# Patient Record
Sex: MALE | Race: OTHER | HISPANIC OR LATINO | Employment: OTHER | URBAN - METROPOLITAN AREA
[De-identification: names, ages, dates, MRNs, and addresses within clinical notes are randomized per-mention and may not be internally consistent; named-entity substitution may affect disease eponyms.]

---

## 2017-10-27 ENCOUNTER — HOSPITAL ENCOUNTER (EMERGENCY)
Facility: HOSPITAL | Age: 72
Discharge: HOME | End: 2017-10-28
Attending: EMERGENCY MEDICINE

## 2017-10-27 ENCOUNTER — HOSPITAL ENCOUNTER (EMERGENCY)
Facility: HOSPITAL | Age: 72
Discharge: HOME | End: 2017-10-27
Attending: EMERGENCY MEDICINE | Admitting: EMERGENCY MEDICINE

## 2017-10-27 VITALS
OXYGEN SATURATION: 98 % | DIASTOLIC BLOOD PRESSURE: 75 MMHG | TEMPERATURE: 98.2 F | RESPIRATION RATE: 20 BRPM | HEART RATE: 87 BPM | SYSTOLIC BLOOD PRESSURE: 132 MMHG | WEIGHT: 170 LBS

## 2017-10-27 VITALS
OXYGEN SATURATION: 99 % | HEART RATE: 88 BPM | RESPIRATION RATE: 20 BRPM | WEIGHT: 170 LBS | SYSTOLIC BLOOD PRESSURE: 181 MMHG | DIASTOLIC BLOOD PRESSURE: 84 MMHG | TEMPERATURE: 99 F

## 2017-10-27 DIAGNOSIS — F11.23 HEROIN WITHDRAWAL (HCC): Primary | ICD-10-CM

## 2017-10-27 DIAGNOSIS — F11.23 WITHDRAWAL FROM OPIOIDS (HCC): Primary | ICD-10-CM

## 2017-10-27 LAB
ANION GAP BLD CALC-SCNC: 14 MMOL/L (ref 4–13)
BUN BLD-MCNC: 12 MG/DL (ref 5–25)
CA-I BLD-SCNC: 1.25 MMOL/L (ref 1.12–1.32)
CHLORIDE BLD-SCNC: 108 MMOL/L (ref 100–108)
CREAT BLD-MCNC: 0.9 MG/DL (ref 0.6–1.3)
GFR SERPL CREATININE-BSD FRML MDRD: 86 ML/MIN/1.73SQ M
GLUCOSE SERPL-MCNC: 124 MG/DL (ref 65–140)
HCT VFR BLD CALC: 38 % (ref 36.5–49.3)
HGB BLDA-MCNC: 12.9 G/DL (ref 12–17)
PCO2 BLD: 23 MMOL/L (ref 21–32)
POTASSIUM BLD-SCNC: 3.8 MMOL/L (ref 3.5–5.3)
SODIUM BLD-SCNC: 141 MMOL/L (ref 136–145)
SPECIMEN SOURCE: ABNORMAL

## 2017-10-27 PROCEDURE — 99283 EMERGENCY DEPT VISIT LOW MDM: CPT

## 2017-10-27 PROCEDURE — 80047 BASIC METABLC PNL IONIZED CA: CPT

## 2017-10-27 PROCEDURE — 85014 HEMATOCRIT: CPT

## 2017-10-27 PROCEDURE — 96372 THER/PROPH/DIAG INJ SC/IM: CPT

## 2017-10-27 RX ORDER — CLONIDINE HYDROCHLORIDE 0.2 MG/1
0.1 TABLET ORAL 2 TIMES DAILY
Qty: 6 TABLET | Refills: 0 | Status: SHIPPED | OUTPATIENT
Start: 2017-10-27 | End: 2021-09-28 | Stop reason: HOSPADM

## 2017-10-27 RX ORDER — ONDANSETRON 4 MG/1
4 TABLET, ORALLY DISINTEGRATING ORAL ONCE
Status: COMPLETED | OUTPATIENT
Start: 2017-10-27 | End: 2017-10-27

## 2017-10-27 RX ORDER — ONDANSETRON 4 MG/1
4 TABLET, ORALLY DISINTEGRATING ORAL EVERY 6 HOURS PRN
Qty: 20 TABLET | Refills: 0 | Status: SHIPPED | OUTPATIENT
Start: 2017-10-27 | End: 2018-03-14

## 2017-10-27 RX ORDER — DIPHENHYDRAMINE HYDROCHLORIDE 50 MG/ML
25 INJECTION INTRAMUSCULAR; INTRAVENOUS ONCE
Status: COMPLETED | OUTPATIENT
Start: 2017-10-27 | End: 2017-10-27

## 2017-10-27 RX ORDER — CLONIDINE HYDROCHLORIDE 0.1 MG/1
0.1 TABLET ORAL ONCE
Status: COMPLETED | OUTPATIENT
Start: 2017-10-27 | End: 2017-10-27

## 2017-10-27 RX ADMIN — CLONIDINE HYDROCHLORIDE 0.1 MG: 0.1 TABLET ORAL at 19:37

## 2017-10-27 RX ADMIN — ONDANSETRON 4 MG: 4 TABLET, ORALLY DISINTEGRATING ORAL at 19:36

## 2017-10-27 RX ADMIN — DIPHENHYDRAMINE HYDROCHLORIDE 25 MG: 50 INJECTION, SOLUTION INTRAMUSCULAR; INTRAVENOUS at 23:49

## 2017-10-27 NOTE — ED PROVIDER NOTES
History  Chief Complaint   Patient presents with    Drug Problem     Moved form [place] to [place], was in methadone program in [place]  Last dose 5 days ago  "Friend gave me suboxone" but states he got "sick and then I went blind"  Currently report shaking and nausea  Denies vomiting  Requesting methadone  Last heroin use last week     HPI    None       Past Medical History:   Diagnosis Date    Diabetes mellitus ([facility] [address] )     Heroin abuse     "20 years" snorting heroin; last use 1 week ago   Hyperlipidemia     Hypertension        History reviewed  No pertinent surgical history  History reviewed  No pertinent family history  I have reviewed and agree with the history as documented      Social History   Substance Use Topics    Smoking status: Current Some Day Smoker     Packs/day: 0 25    Smokeless tobacco: Never Used    Alcohol use No        Review of Systems    Physical Exam  ED Triage Vitals   Temperature Pulse Respirations Blood Pressure SpO2   10/27/17 1827 10/27/17 1827 10/27/17 1827 10/27/17 1829 10/27/17 1827   99 °F (37 2 °C) 89 22 161/73 98 %      Temp Source Heart Rate Source Patient Position - Orthostatic VS BP Location FiO2 (%)   10/27/17 1827 10/27/17 1827 10/27/17 1941 10/27/17 1941 --   Temporal Monitor Sitting Right arm       Pain Score       --                  Orthostatic Vital Signs  Vitals:    10/27/17 1827 10/27/17 1829 10/27/17 1937 10/27/17 1941   BP:  161/73 (!) 181/84 (!) 181/84   Pulse: 89   88   Patient Position - Orthostatic VS:    Sitting       Physical Exam    ED Medications  Medications   ondansetron (ZOFRAN-ODT) dispersible tablet 4 mg (4 mg Oral Given 10/27/17 1936)   cloNIDine (CATAPRES) tablet 0 1 mg (0 1 mg Oral Given 10/27/17 1937)       Diagnostic Studies  Results Reviewed     Procedure Component Value Units Date/Time    POCT Chem 8+ [[id number]]  (Abnormal) Collected:  10/27/17 1943    Lab Status:  Final result Updated:  10/27/17 1947     SODIUM, I-STAT 141 mmol/l      Potassium, i-STAT 3 8 mmol/L      Chloride, istat 108 mmol/L      CO2, i-STAT 23 mmol/L      Anion Gap, Istat 14 (H) mmol/L      Calcium, Ionized i-STAT 1 25 mmol/L      BUN, I-STAT 12 mg/dl      Creatinine, i-STAT 0 9 mg/dl      eGFR 86 ml/min/1 73sq m      Glucose, i-STAT 124 mg/dl      Hct, i-STAT 38 %      Hgb, i-STAT 12 9 g/dl      Specimen Type VENOUS                 No orders to display              Procedures  Procedures       Phone Contacts  ED Phone Contact    ED Course  ED Course                                [facility]  CritCare Time    Disposition  Final diagnoses:   None     ED Disposition     None      Follow-up Information    None       Patient's Medications    No medications on file     No discharge procedures on file      ED Provider  Electronically Signed by

## 2017-10-28 PROCEDURE — 99284 EMERGENCY DEPT VISIT MOD MDM: CPT

## 2017-10-28 NOTE — ED PROVIDER NOTES
History  No chief complaint on file  70 YO male presents for withdrawal from heroin  Pt states he has been having symptoms for the last few days  States he recently moved from [place] were he was following with a Methadone clinic  Pt states he has been feeling nauseous with chills, additionally notes some blurry vision  Pt is requesting methadone in the ED  He states he got a suboxone from a friend but this only made his symptoms worse  Pt denies CP/SOB/F/C/N/V/D/C, no dysuria, burning on urination or blood in urine  History provided by:  Patient   used: No    Withdrawal - Drug   Severity:  Moderate  Onset quality:  Gradual  Duration:  3 days  Timing:  Constant  Progression:  Worsening  Chronicity:  New  Suspected agents:  Heroin  Associated symptoms: nausea and weakness    Associated symptoms: no abdominal pain, no agitation, no bowel incontinence, no confusion, no shortness of breath and no vomiting    Nausea:     Severity:  Moderate    Onset quality:  Gradual    Duration:  3 days    Timing:  Constant    Progression:  Worsening  Weakness:     Severity:  Moderate    Onset quality:  Gradual    Duration:  3 days    Timing:  Intermittent    Progression:  Waxing and waning    Chronicity:  New      Cannot display prior to admission medications because the patient has not been admitted in this contact  Past Medical History:   Diagnosis Date    Diabetes mellitus ([facility] [address] )     Heroin abuse     "20 years" snorting heroin; last use 1 week ago   Hyperlipidemia     Hypertension        No past surgical history on file  No family history on file  I have reviewed and agree with the history as documented  Social History   Substance Use Topics    Smoking status: Current Some Day Smoker     Packs/day: 0 25    Smokeless tobacco: Never Used    Alcohol use No        Review of Systems   Constitutional: Negative for fever  HENT: Negative for dental problem      Eyes: Negative for visual disturbance  Respiratory: Negative for shortness of breath  Cardiovascular: Negative for chest pain  Gastrointestinal: Positive for nausea  Negative for abdominal pain, bowel incontinence and vomiting  Genitourinary: Negative for dysuria and frequency  Musculoskeletal: Negative for neck pain and neck stiffness  Skin: Negative for rash  Neurological: Positive for weakness  Negative for dizziness and light-headedness  Psychiatric/Behavioral: Negative for agitation, behavioral problems and confusion  All other systems reviewed and are negative  Physical Exam  ED Triage Vitals   Temp Pulse Resp BP SpO2   -- -- -- -- --      Temp src Heart Rate Source Patient Position - Orthostatic VS BP Location FiO2 (%)   -- -- -- -- --      Pain Score       --           Orthostatic Vital Signs  There were no vitals filed for this visit  Physical Exam   Constitutional: He is oriented to person, place, and time  He appears well-developed and well-nourished  HENT:   Head: Normocephalic and atraumatic  Eyes: EOM are normal  Pupils are equal, round, and reactive to light  Neck: Normal range of motion  Cardiovascular: Normal rate, regular rhythm and normal heart sounds  Pulmonary/Chest: Effort normal and breath sounds normal    Abdominal: Soft  Musculoskeletal: Normal range of motion  Neurological: He is alert and oriented to person, place, and time  Skin: Skin is warm and dry  Psychiatric: He has a normal mood and affect  His behavior is normal    Nursing note and vitals reviewed  ED Medications  Medications - No data to display    Diagnostic Studies  Results Reviewed     None                 No orders to display              Procedures  Procedures       Phone Contacts  ED Phone Contact    ED Course  ED Course                                MDM  Number of Diagnoses or Management Options  Withdrawal from opioids [facility]): new and requires workup  Diagnosis management comments: 1   Heroin withdrawal - Pt recently moved from [place], had been on methadone, now withdrawing from this  Explained methadone and suboxone could not be prescribed in the ED and, additionally, narcotics would not be used for treatment  Will give Zofran and Clonidine for symptom  Give the number for the [employer]  Will check electrolytes for dehydration  Amount and/or Complexity of Data Reviewed  Clinical lab tests: ordered and reviewed    Patient Progress  Patient progress: stable    CritCare Time    Disposition  Final diagnoses:   None     ED Disposition     None      Follow-up Information    None       Patient's Medications   Discharge Prescriptions    No medications on file     No discharge procedures on file      ED Provider  Electronically Signed by           Hieu Lynne MD  10/27/17 4846

## 2017-10-28 NOTE — DISCHARGE INSTRUCTIONS
El número de la [place] se incluyó en ashley instrucciones de ofe anteriores, debe seguir con ellos para talon mayor administración si pueden ofrecer ayuda  Deje de zenobia la clonidina si está empeorando la ansiedad  Dependencia a los opioides   LO QUE NECESITA SABER:   Los opioides son medicamentos, debby la morfina y la codeína, que se usan para tratar el dolor  La dependencia sucede después de jcarlos tomado opioides con regularidad y por un periodo de [place]  La dependencia quiere [employer] cuerpo se [place] a las cantidades de medicamento que usted giovanni  La dependencia no es lo mismo que la [place]  La adicción significa que talon persona [place] opiáceos para drogarse en vez de usarlos para controlar el dolor  INSTRUCCIONES SOBRE EL OFE HOSPITALARIA:   Medicamentos:  Usted podría  necesitar lo siguiente:  · Los medicamentos LARA y otros medicamentos para el dolor  ayudan a disminuir el dolor  Los AINEs se pueden obtener sin receta médica  Consulte con farrell médico cuál medicamento es el adecuado para usted  Pregunte cuánto zenobia y cuándo  Tómelos debby se le indique  Cuando no se dana de la [employer], los medicamentos antiinflamatorios no esteroides pueden causar sangrado estomacal y problemas renales  · Los medicamentos de terapia de mantenimiento  son otro tipo de opiáceo que reemplaza el opiáceo del que usted depende  · Harvel ashley medicamentos debby se le haya indicado  Consulte con farrell médico si usted rosio que farrell medicamento no le está ayudando o si presenta efectos secundarios  Infórmele si es alérgico a cualquier medicamento  Mantenga talon lista actualizada de los [place], las vitaminas y los productos herbales que giovanni  Incluya los siguientes datos de los medicamentos: cantidad, frecuencia y motivo de administración  Traiga con usted la lista o los envases de la píldoras a ashley citas de seguimiento  Lleve la lista de los medicamentos con usted en hector de talon emergencia    Latoya Lease talon roman de seguimiento con farrell médico o especialista en dolor debby le indiquen:  Es posible que necesite regresar para que le nima otros exámenes  Además podrían referirlo a talon clínica especializada para recibir medicamentos de terapia de mantenimiento en forma regular  Anote ashley preguntas para que se acuerde de hacerlas jesusita ashley visitas  Consejería y [de-identified] psicológico:  Farrell médico podría recomendarle consejería psicológica debby parte de farrell plan de tratamiento  Un médico especialmente entrenado hablará con usted sobre farrell dependencia de los opiáceos  Le ayudarán a encontrar maneras de depender menos de los opiáceos  Además, el médico podría ayudarle a buscar los recursos para resolver cualquier necesidad de farrell nick diaria, debby vivienda o empleo  Pregúntele a farrell Latasha Dust vitaminas y minerales son adecuados para usted  · Usted no puede articular las palabras  · Usted tiene dificultad para permanecer despierto  · Tienen náuseas y vómitos  · Usted se enfada o llora con facilidad  · Usted tiene dificultad para mantener el equilibrio  · Usted tiene preguntas o inquietudes acerca de farrell condición o cuidado  Busque atención médica de inmediato o llame al 911 si:   · Usted se siente desvanecer o desmayar  · Farrell ritmo cardíaco está acelerado, lento o irregular  · Usted sufre talon convulsión  © 2017 [address] Information is for End User's use only and may not be sold, redistributed or otherwise used for commercial purposes  All illustrations and images included in CareNotes® are the copyrighted property of A D A M , Inc  or [facility]  Esta información es sólo para uso en educación  Farrell intención no es darle un consejo médico sobre enfermedades o tratamientos  Colsulte con farrell Suzen Corinna farmacéutico antes de seguir cualquier régimen médico para saber si es seguro y efectivo para usted

## 2017-10-28 NOTE — DISCHARGE INSTRUCTIONS
[address] [facility] ([place], [place])    La dirección de la [place] está incluida en estas instrucciones de descarga, es posible que puedan ofrecer ayuda adicional  Son ordenados por orden de llegada, por lo que es importante ir temprano antes de las 7:00 a m  También puede probar las otras clínicas de metadona en [place]  [place] Zofran según sea necesario para las náuseas, esto se puede colocar debajo de la lengua para disolverlo si está vomitando  La clonidina puede ayudar con ashley otros síntomas de abstinencia  Dependencia a los opioides   LO QUE NECESITA SABER:   Los opioides son medicamentos, debby la morfina y la codeína, que se usan para tratar el dolor  La dependencia sucede después de jcarlos tomado opioides con regularidad y por un periodo de [place]  La dependencia quiere [employer] cuerpo se [place] a las cantidades de medicamento que usted giovanni  La dependencia no es lo mismo que la [place]  La adicción significa que talon persona [place] opiáceos para drogarse en vez de usarlos para controlar el dolor  INSTRUCCIONES SOBRE EL OFE HOSPITALARIA:   Medicamentos:  Usted podría  necesitar lo siguiente:  · Los medicamentos LARA y otros medicamentos para el dolor  ayudan a disminuir el dolor  Los AINEs se pueden obtener sin receta médica  Consulte con farrell médico cuál medicamento es el adecuado para usted  Pregunte cuánto zenobia y cuándo  Tómelos debby se le indique  Cuando no se dana de la [employer], los medicamentos antiinflamatorios no esteroides pueden causar sangrado estomacal y problemas renales  · Los medicamentos de terapia de mantenimiento  son otro tipo de opiáceo que reemplaza el opiáceo del que usted depende  · [place] ashley medicamentos debby se le haya indicado  Consulte con farrell médico si usted rosio que farrell medicamento no le está ayudando o si presenta efectos secundarios  Infórmele si es alérgico a cualquier medicamento   Lelia Randy actualizada de [employer], las vitaminas y los productos herbales que giovanni  Incluya los siguientes datos de los medicamentos: cantidad, frecuencia y motivo de administración  Traiga con usted la lista o los envases de la píldoras a ashley citas de seguimiento  Lleve la lista de los medicamentos con usted en hector de talon emergencia  [place] talon roman de seguimiento con farrell médico o especialista en dolor debby le indiquen:  Es posible que necesite regresar para que le nima otros exámenes  Además podrían referirlo a talon clínica especializada para recibir medicamentos de terapia de mantenimiento en forma regular  Anote ashley preguntas para que se acuerde de hacerlas jesusita ashley visitas  Consejería y [de-identified] psicológico:  Farrell médico podría recomendarle consejería psicológica debby parte de farrell plan de tratamiento  Un médico especialmente entrenado hablará con usted sobre farrell dependencia de los opiáceos  Le ayudarán a encontrar maneras de depender menos de los opiáceos  Además, el médico podría ayudarle a buscar los recursos para resolver cualquier necesidad de farrell nick diaria, debby vivienda o empleo  Pregúntele a farrell [place] vitaminas y minerales son adecuados para usted  · Usted no puede articular las palabras  · Usted tiene dificultad para permanecer despierto  · Tienen náuseas y vómitos  · Usted se enfada o llora con facilidad  · Usted tiene dificultad para mantener el equilibrio  · Usted tiene preguntas o inquietudes acerca de farrell condición o cuidado  Busque atención médica de inmediato o llame al 911 si:   · Usted se siente desvanecer o desmayar  · Farrell ritmo cardíaco está acelerado, lento o irregular  · Usted sufre talon convulsión  © 2017 [facility] [facility] Information is for End User's use only and may not be sold, redistributed or otherwise used for commercial purposes   All illustrations and images included in CareNotes® are the copyrighted property of A D A M , Inc  or [employer]  Esta información es sólo para uso en educación  Farrell intención no es darle un consejo médico sobre enfermedades o tratamientos  Colsulte con farrell Anai Cabot farmacéutico antes de seguir cualquier régimen médico para saber si es seguro y efectivo para usted

## 2017-10-28 NOTE — ED PROVIDER NOTES
History  Chief Complaint   Patient presents with    Tremors     patient reports taking medication unknown per the patient and family discharged home on tonight and had reaction to medication  patient noted for increased aggitation and "jumping "     70 YO male, recent discharge from the ED, re-presents with anxiety and tremors  Pt has history of heroin abuse, on methadone in [place]  Pt moved to the area last week and has been without his methadone, did try a suboxone from a friend which worsened his symptoms  Pt was just seen in the ED hours before requesting methadone, on explaining methadone could not be given pt agreed to try symptomatic Tx with Zofran and clonidine  He was given each in the ED and sent with a prescription for each  Pt states he went immediately to the pharmacy and filled these prescriptions, taking one of each pill  States symptoms seemed to worsen and he has been restless since  Pt denies CP/SOB/F/C/N/V/D/C, no dysuria, burning on urination or blood in urine  History provided by:  Patient and relative   used: No        Prior to Admission Medications   Prescriptions Last Dose Informant Patient Reported? Taking? cloNIDine (CATAPRES) 0 2 mg tablet   No No   Sig: Take 0 5 tablets by mouth 2 (two) times a day   ondansetron (ZOFRAN-ODT) 4 mg disintegrating tablet   No No   Sig: Take 1 tablet by mouth every 6 (six) hours as needed for nausea or vomiting      Facility-Administered Medications: None       Past Medical History:   Diagnosis Date    Diabetes mellitus ([facility] [address] )     Heroin abuse     "20 years" snorting heroin; last use 1 week ago   Hyperlipidemia     Hypertension        History reviewed  No pertinent surgical history  History reviewed  No pertinent family history  I have reviewed and agree with the history as documented      Social History   Substance Use Topics    Smoking status: Current Some Day Smoker     Packs/day: 0 25    Smokeless tobacco: Never Used    Alcohol use No        Review of Systems   Constitutional: Negative for fever  HENT: Negative for dental problem  Eyes: Negative for visual disturbance  Respiratory: Negative for shortness of breath  Cardiovascular: Negative for chest pain  Gastrointestinal: Negative for abdominal pain, nausea and vomiting  Genitourinary: Negative for dysuria and frequency  Musculoskeletal: Negative for neck pain and neck stiffness  Skin: Negative for rash  Neurological: Negative for dizziness, weakness and light-headedness  Psychiatric/Behavioral: Negative for agitation, behavioral problems and confusion  The patient is nervous/anxious and is hyperactive  All other systems reviewed and are negative  Physical Exam  ED Triage Vitals [10/27/17 2332]   Temperature Pulse Respirations Blood Pressure SpO2   98 2 °F (36 8 °C) 87 20 132/75 98 %      Temp src Heart Rate Source Patient Position - Orthostatic VS BP Location FiO2 (%)   -- Monitor -- Right arm --      Pain Score       7           Orthostatic Vital Signs  Vitals:    10/27/17 2332   BP: 132/75   Pulse: 87       Physical Exam   Constitutional: He is oriented to person, place, and time  He appears well-developed and well-nourished  HENT:   Head: Normocephalic and atraumatic  Eyes: EOM are normal    Neck: Normal range of motion  Cardiovascular: Normal rate, regular rhythm and normal heart sounds  Pulmonary/Chest: Effort normal and breath sounds normal    Abdominal: Soft  Musculoskeletal: Normal range of motion  Neurological: He is alert and oriented to person, place, and time  Skin: Skin is warm and dry  Psychiatric: His behavior is normal  His mood appears anxious  Nursing note and vitals reviewed        ED Medications  Medications   diphenhydrAMINE (BENADRYL) injection 25 mg (25 mg Intramuscular Given 10/27/17 1086)       Diagnostic Studies  Results Reviewed     None                 No orders to display Procedures  Procedures       Phone Contacts  ED Phone Contact    ED Course  ED Course                                MDM  Number of Diagnoses or Management Options  Withdrawal from opioids [facility]): new and requires workup  Diagnosis management comments: 1  Withdrawal - Pt was seen hours prior, given Rx for Zofran and clonidine which he took on picking up the prescriptions  Family is now present  Explained to the pt as well as family that he should not have taken the medications as he had just received them, the clonidine is twice daily and taking this too often could have adverse effects, given the nature of clonidine, these effects could be serious  Again told pt he would need to follow up for additional treatment  Pt and family are understanding of the need to follow the instructions for the prescribed medications  Amount and/or Complexity of Data Reviewed  Obtain history from someone other than the patient: yes  Review and summarize past medical records: yes    Patient Progress  Patient progress: stable    CritCare Time    Disposition  Final diagnoses:   Withdrawal from opioids [facility])     Time reflects when diagnosis was documented in both MDM as applicable and the Disposition within this note     Time User Action Codes Description Comment    10/27/2017 11:30 PM Geovanny Lujan [[id number]] Withdrawal from opioids [facility])       ED Disposition     ED Disposition Condition Comment    Discharge  Karyle Huerta discharge to home/self care      Condition at discharge: Stable        Follow-up Information    None       Discharge Medication List as of 10/28/2017 12:02 AM      CONTINUE these medications which have NOT CHANGED    Details   cloNIDine (CATAPRES) 0 2 mg tablet Take 0 5 tablets by mouth 2 (two) times a day, Starting Fri 10/27/2017, Print      ondansetron (ZOFRAN-ODT) 4 mg disintegrating tablet Take 1 tablet by mouth every 6 (six) hours as needed for nausea or vomiting, Starting Fri 10/27/2017, Print No discharge procedures on file      ED Provider  Electronically Signed by           Karley Oneill MD  10/28/17 1129

## 2017-11-13 NOTE — ED PROVIDER NOTES
History  Chief Complaint   Patient presents with    Drug Problem     Moved form [place] to [place], was in methadone program in [place]  Last dose 5 days ago  "Friend gave me suboxone" but states he got "sick and then I went blind"  Currently report shaking and nausea  Denies vomiting  Requesting methadone  Last heroin use last week     HPI      History  No chief complaint on file      72 YO male presents for withdrawal from heroin  Pt states he has been having symptoms for the last few days  States he recently moved from [place] were he was following with a Methadone clinic  Pt states he has been feeling nauseous with chills, additionally notes some blurry vision  Pt is requesting methadone in the ED  He states he got a suboxone from a friend but this only made his symptoms worse  Pt denies CP/SOB/F/C/N/V/D/C, no dysuria, burning on urination or blood in urine         History provided by:  Patient   used: No    Withdrawal - Drug   Severity:  Moderate  Onset quality:  Gradual  Duration:  3 days  Timing:  Constant  Progression:  Worsening  Chronicity:  New  Suspected agents:  Heroin  Associated symptoms: nausea and weakness    Associated symptoms: no abdominal pain, no agitation, no bowel incontinence, no confusion, no shortness of breath and no vomiting    Nausea:     Severity:  Moderate    Onset quality:  Gradual    Duration:  3 days    Timing:  Constant    Progression:  Worsening  Weakness:     Severity:  Moderate    Onset quality:  Gradual    Duration:  3 days    Timing:  Intermittent    Progression:  Waxing and waning    Chronicity:  New        Cannot display prior to admission medications because the patient has not been admitted in this contact          Medical History        Past Medical History:   Diagnosis Date    Diabetes mellitus ([facility] [address] )      Heroin abuse       "20 years" snorting heroin; last use 1 week ago      Hyperlipidemia      Hypertension              Surgical History   No past surgical history on file         No family history on file  I have reviewed and agree with the history as documented            Social History   Substance Use Topics    Smoking status: Current Some Day Smoker       Packs/day: 0 25    Smokeless tobacco: Never Used    Alcohol use No         Review of Systems   Constitutional: Negative for fever  HENT: Negative for dental problem  Eyes: Negative for visual disturbance  Respiratory: Negative for shortness of breath  Cardiovascular: Negative for chest pain  Gastrointestinal: Positive for nausea  Negative for abdominal pain, bowel incontinence and vomiting  Genitourinary: Negative for dysuria and frequency  Musculoskeletal: Negative for neck pain and neck stiffness  Skin: Negative for rash  Neurological: Positive for weakness  Negative for dizziness and light-headedness  Psychiatric/Behavioral: Negative for agitation, behavioral problems and confusion  All other systems reviewed and are negative         Physical Exam         ED Triage Vitals   Temp Pulse Resp BP SpO2   -- -- -- -- --       Temp src Heart Rate Source Patient Position - Orthostatic VS BP Location FiO2 (%)   -- -- -- -- --       Pain Score           --                 Orthostatic Vital Signs  Vitals   There were no vitals filed for this visit         Physical Exam   Constitutional: He is oriented to person, place, and time  He appears well-developed and well-nourished  HENT:   Head: Normocephalic and atraumatic  Eyes: EOM are normal  Pupils are equal, round, and reactive to light  Neck: Normal range of motion  Cardiovascular: Normal rate, regular rhythm and normal heart sounds  Pulmonary/Chest: Effort normal and breath sounds normal    Abdominal: Soft  Musculoskeletal: Normal range of motion  Neurological: He is alert and oriented to person, place, and time  Skin: Skin is warm and dry  Psychiatric: He has a normal mood and affect   His behavior is normal    Nursing note and vitals reviewed         ED Medications  Medications - No data to display     Diagnostic Studies      Results Reviewed      None                    No orders to display                Procedures  Procedures        Phone Contacts  ED Phone Contact     ED Course  ED Course                                              MDM  Number of Diagnoses or Management Options  Withdrawal from opioids [facility]): new and requires workup  Diagnosis management comments: 1  Heroin withdrawal - Pt recently moved from [place], had been on methadone, now withdrawing from this  Explained methadone and suboxone could not be prescribed in the ED and, additionally, narcotics would not be used for treatment  Will give Zofran and Clonidine for symptom  Give the number for the [employer]  Will check electrolytes for dehydration         Amount and/or Complexity of Data Reviewed  Clinical lab tests: ordered and reviewed  Patient Progress  Patient progress: stable     CritCare Time     Disposition  Final diagnoses:   None          ED Disposition      None       Follow-up Information    None             Patient's Medications   Discharge Prescriptions     No medications on file      No discharge procedures on file      ED Provider  Electronically Signed by              Kevin Adams MD  10/27/17 0600      None       Past Medical History:   Diagnosis Date    Diabetes mellitus ([address] )     Heroin abuse     "20 years" snorting heroin; last use 1 week ago   Hyperlipidemia     Hypertension        History reviewed  No pertinent surgical history  History reviewed  No pertinent family history  I have reviewed and agree with the history as documented      Social History   Substance Use Topics    Smoking status: Current Some Day Smoker     Packs/day: 0 25    Smokeless tobacco: Never Used    Alcohol use No        Review of Systems    Physical Exam  ED Triage Vitals   Temperature Pulse Respirations Blood Pressure SpO2   10/27/17 1827 10/27/17 1827 10/27/17 1827 10/27/17 1829 10/27/17 1827   99 °F (37 2 °C) 89 22 161/73 98 %      Temp Source Heart Rate Source Patient Position - Orthostatic VS BP Location FiO2 (%)   10/27/17 1827 10/27/17 1827 10/27/17 1941 10/27/17 1941 --   Temporal Monitor Sitting Right arm       Pain Score       --                  Orthostatic Vital Signs  Vitals:    10/27/17 1827 10/27/17 1829 10/27/17 1937 10/27/17 1941   BP:  161/73 (!) 181/84 (!) 181/84   Pulse: 89   88   Patient Position - Orthostatic VS:    Sitting       Physical Exam    ED Medications  Medications   ondansetron (ZOFRAN-ODT) dispersible tablet 4 mg (4 mg Oral Given 10/27/17 1936)   cloNIDine (CATAPRES) tablet 0 1 mg (0 1 mg Oral Given 10/27/17 1937)       Diagnostic Studies  Results Reviewed     Procedure Component Value Units Date/Time    POCT Chem 8+ [[id number]]  (Abnormal) Collected:  10/27/17 1943    Lab Status:  Final result Updated:  10/27/17 1947     SODIUM, I-STAT 141 mmol/l      Potassium, i-STAT 3 8 mmol/L      Chloride, istat 108 mmol/L      CO2, i-STAT 23 mmol/L      Anion Gap, Istat 14 (H) mmol/L      Calcium, Ionized i-STAT 1 25 mmol/L      BUN, I-STAT 12 mg/dl      Creatinine, i-STAT 0 9 mg/dl      eGFR 86 ml/min/1 73sq m      Glucose, i-STAT 124 mg/dl      Hct, i-STAT 38 %      Hgb, i-STAT 12 9 g/dl      Specimen Type VENOUS                 No orders to display              Procedures  Procedures       Phone Contacts  ED Phone Contact    ED Course  ED Course                                MDM  CritCare Time    Disposition  Final diagnoses:   Heroin withdrawal ([address] )     Time reflects when diagnosis was documented in both MDM as applicable and the Disposition within this note     Time User Action Codes Description Comment    10/27/2017  7:57 PM Florangelia Stains E Add [F11 23] Heroin withdrawal [facility])       ED Disposition     ED Disposition Condition Comment    Discharge  Tere Peace discharge to home/self care      Condition at discharge: Stable Follow-up Information    None       Discharge Medication List as of 10/27/2017  8:03 PM      START taking these medications    Details   cloNIDine (CATAPRES) 0 2 mg tablet Take 0 5 tablets by mouth 2 (two) times a day, Starting Fri 10/27/2017, Normal      ondansetron (ZOFRAN-ODT) 4 mg disintegrating tablet Take 1 tablet by mouth every 6 (six) hours as needed for nausea or vomiting, Starting Fri 10/27/2017, Normal           No discharge procedures on file      ED Provider  Electronically Signed by           Riana Tavarez MD  11/13/17 8957

## 2018-03-14 ENCOUNTER — HOSPITAL ENCOUNTER (OUTPATIENT)
Facility: HOSPITAL | Age: 73
Setting detail: OBSERVATION
LOS: 1 days | Discharge: HOME | End: 2018-03-16
Attending: EMERGENCY MEDICINE | Admitting: INTERNAL MEDICINE
Payer: COMMERCIAL

## 2018-03-14 ENCOUNTER — APPOINTMENT (EMERGENCY)
Dept: RADIOLOGY | Facility: HOSPITAL | Age: 73
End: 2018-03-14
Payer: COMMERCIAL

## 2018-03-14 DIAGNOSIS — R07.9 CHEST PAIN, RULE OUT ACUTE MYOCARDIAL INFARCTION: Primary | ICD-10-CM

## 2018-03-14 DIAGNOSIS — Z71.6 TOBACCO ABUSE COUNSELING: Chronic | ICD-10-CM

## 2018-03-14 PROBLEM — I10 HTN (HYPERTENSION): Status: ACTIVE | Noted: 2018-03-14

## 2018-03-14 PROBLEM — F11.11 HISTORY OF HEROIN ABUSE (HCC): Status: ACTIVE | Noted: 2018-03-14

## 2018-03-14 PROBLEM — E78.5 HLD (HYPERLIPIDEMIA): Status: ACTIVE | Noted: 2018-03-14

## 2018-03-14 PROBLEM — N28.9 RENAL DISEASE: Status: ACTIVE | Noted: 2018-03-14

## 2018-03-14 PROBLEM — F11.90 METHADONE USE: Status: ACTIVE | Noted: 2018-03-14

## 2018-03-14 PROBLEM — E11.9 DIABETES MELLITUS (HCC): Status: ACTIVE | Noted: 2018-03-14

## 2018-03-14 LAB
ALBUMIN SERPL BCP-MCNC: 3.8 G/DL (ref 3.5–5)
ALP SERPL-CCNC: 115 U/L (ref 46–116)
ALT SERPL W P-5'-P-CCNC: 27 U/L (ref 12–78)
AMPHETAMINES SERPL QL SCN: NEGATIVE
ANION GAP SERPL CALCULATED.3IONS-SCNC: 9 MMOL/L (ref 4–13)
AST SERPL W P-5'-P-CCNC: 19 U/L (ref 5–45)
ATRIAL RATE: 78 BPM
BARBITURATES UR QL: NEGATIVE
BASOPHILS # BLD AUTO: 0.03 THOUSANDS/ΜL (ref 0–0.1)
BASOPHILS NFR BLD AUTO: 0 % (ref 0–1)
BENZODIAZ UR QL: NEGATIVE
BILIRUB SERPL-MCNC: 0.19 MG/DL (ref 0.2–1)
BUN SERPL-MCNC: 33 MG/DL (ref 5–25)
CALCIUM SERPL-MCNC: 9.1 MG/DL (ref 8.3–10.1)
CHLORIDE SERPL-SCNC: 101 MMOL/L (ref 100–108)
CO2 SERPL-SCNC: 30 MMOL/L (ref 21–32)
COCAINE UR QL: NEGATIVE
CREAT SERPL-MCNC: 1.32 MG/DL (ref 0.6–1.3)
EOSINOPHIL # BLD AUTO: 0.29 THOUSAND/ΜL (ref 0–0.61)
EOSINOPHIL NFR BLD AUTO: 3 % (ref 0–6)
ERYTHROCYTE [DISTWIDTH] IN BLOOD BY AUTOMATED COUNT: 14.4 % (ref 11.6–15.1)
GFR SERPL CREATININE-BSD FRML MDRD: 54 ML/MIN/1.73SQ M
GLUCOSE SERPL-MCNC: 118 MG/DL (ref 65–140)
GLUCOSE SERPL-MCNC: 84 MG/DL (ref 65–140)
HCT VFR BLD AUTO: 38.8 % (ref 36.5–49.3)
HGB BLD-MCNC: 12.6 G/DL (ref 12–17)
LYMPHOCYTES # BLD AUTO: 2.94 THOUSANDS/ΜL (ref 0.6–4.47)
LYMPHOCYTES NFR BLD AUTO: 28 % (ref 14–44)
MCH RBC QN AUTO: 30.1 PG (ref 26.8–34.3)
MCHC RBC AUTO-ENTMCNC: 32.5 G/DL (ref 31.4–37.4)
MCV RBC AUTO: 93 FL (ref 82–98)
METHADONE UR QL: POSITIVE
MONOCYTES # BLD AUTO: 0.93 THOUSAND/ΜL (ref 0.17–1.22)
MONOCYTES NFR BLD AUTO: 9 % (ref 4–12)
NEUTROPHILS # BLD AUTO: 6.28 THOUSANDS/ΜL (ref 1.85–7.62)
NEUTS SEG NFR BLD AUTO: 60 % (ref 43–75)
NRBC BLD AUTO-RTO: 0 /100 WBCS
OPIATES UR QL SCN: POSITIVE
P AXIS: 87 DEGREES
PCP UR QL: NEGATIVE
PLATELET # BLD AUTO: 159 THOUSANDS/UL (ref 149–390)
PMV BLD AUTO: 13.1 FL (ref 8.9–12.7)
POTASSIUM SERPL-SCNC: 3.6 MMOL/L (ref 3.5–5.3)
PR INTERVAL: 172 MS
PROT SERPL-MCNC: 7.4 G/DL (ref 6.4–8.2)
QRS AXIS: 69 DEGREES
QRSD INTERVAL: 88 MS
QT INTERVAL: 358 MS
QTC INTERVAL: 408 MS
RBC # BLD AUTO: 4.19 MILLION/UL (ref 3.88–5.62)
SODIUM SERPL-SCNC: 140 MMOL/L (ref 136–145)
T WAVE AXIS: 61 DEGREES
THC UR QL: NEGATIVE
TROPONIN I SERPL-MCNC: <0.02 NG/ML
TROPONIN I SERPL-MCNC: <0.02 NG/ML
VENTRICULAR RATE: 78 BPM
WBC # BLD AUTO: 10.47 THOUSAND/UL (ref 4.31–10.16)

## 2018-03-14 PROCEDURE — 71046 X-RAY EXAM CHEST 2 VIEWS: CPT

## 2018-03-14 PROCEDURE — 84484 ASSAY OF TROPONIN QUANT: CPT | Performed by: PHYSICIAN ASSISTANT

## 2018-03-14 PROCEDURE — 82948 REAGENT STRIP/BLOOD GLUCOSE: CPT

## 2018-03-14 PROCEDURE — 80053 COMPREHEN METABOLIC PANEL: CPT | Performed by: EMERGENCY MEDICINE

## 2018-03-14 PROCEDURE — 99285 EMERGENCY DEPT VISIT HI MDM: CPT

## 2018-03-14 PROCEDURE — 80307 DRUG TEST PRSMV CHEM ANLYZR: CPT | Performed by: PHYSICIAN ASSISTANT

## 2018-03-14 PROCEDURE — 93005 ELECTROCARDIOGRAM TRACING: CPT

## 2018-03-14 PROCEDURE — 93005 ELECTROCARDIOGRAM TRACING: CPT | Performed by: EMERGENCY MEDICINE

## 2018-03-14 PROCEDURE — 81001 URINALYSIS AUTO W/SCOPE: CPT | Performed by: PHYSICIAN ASSISTANT

## 2018-03-14 PROCEDURE — 93005 ELECTROCARDIOGRAM TRACING: CPT | Performed by: PHYSICIAN ASSISTANT

## 2018-03-14 PROCEDURE — 84156 ASSAY OF PROTEIN URINE: CPT | Performed by: PHYSICIAN ASSISTANT

## 2018-03-14 PROCEDURE — 94640 AIRWAY INHALATION TREATMENT: CPT

## 2018-03-14 PROCEDURE — 93010 ELECTROCARDIOGRAM REPORT: CPT | Performed by: INTERNAL MEDICINE

## 2018-03-14 PROCEDURE — 84484 ASSAY OF TROPONIN QUANT: CPT | Performed by: EMERGENCY MEDICINE

## 2018-03-14 PROCEDURE — 82570 ASSAY OF URINE CREATININE: CPT | Performed by: PHYSICIAN ASSISTANT

## 2018-03-14 PROCEDURE — 99220 PR INITIAL OBSERVATION CARE/DAY 70 MINUTES: CPT | Performed by: PHYSICIAN ASSISTANT

## 2018-03-14 PROCEDURE — 36415 COLL VENOUS BLD VENIPUNCTURE: CPT | Performed by: EMERGENCY MEDICINE

## 2018-03-14 PROCEDURE — 85025 COMPLETE CBC W/AUTO DIFF WBC: CPT | Performed by: EMERGENCY MEDICINE

## 2018-03-14 RX ORDER — HEPARIN SODIUM 5000 [USP'U]/ML
5000 INJECTION, SOLUTION INTRAVENOUS; SUBCUTANEOUS EVERY 8 HOURS SCHEDULED
Status: DISCONTINUED | OUTPATIENT
Start: 2018-03-14 | End: 2018-03-16 | Stop reason: HOSPADM

## 2018-03-14 RX ORDER — OMEPRAZOLE AND SODIUM BICARBONATE 40; 1100 MG/1; MG/1
1 CAPSULE ORAL
Status: ON HOLD | COMMUNITY
End: 2021-09-24 | Stop reason: ALTCHOICE

## 2018-03-14 RX ORDER — ASPIRIN 81 MG/1
81 TABLET ORAL DAILY
Status: DISCONTINUED | OUTPATIENT
Start: 2018-03-15 | End: 2018-03-16 | Stop reason: HOSPADM

## 2018-03-14 RX ORDER — FENOFIBRATE 145 MG/1
145 TABLET, COATED ORAL DAILY
COMMUNITY

## 2018-03-14 RX ORDER — ASPIRIN 81 MG/1
81 TABLET ORAL DAILY
COMMUNITY

## 2018-03-14 RX ORDER — ATORVASTATIN CALCIUM 40 MG/1
40 TABLET, FILM COATED ORAL DAILY
Status: DISCONTINUED | OUTPATIENT
Start: 2018-03-15 | End: 2018-03-16 | Stop reason: HOSPADM

## 2018-03-14 RX ORDER — TIZANIDINE 4 MG/1
4 TABLET ORAL
Status: ON HOLD | COMMUNITY
End: 2021-09-24 | Stop reason: ALTCHOICE

## 2018-03-14 RX ORDER — CLONIDINE HYDROCHLORIDE 0.1 MG/1
0.1 TABLET ORAL 2 TIMES DAILY
Status: DISCONTINUED | OUTPATIENT
Start: 2018-03-14 | End: 2018-03-16 | Stop reason: HOSPADM

## 2018-03-14 RX ORDER — TAMSULOSIN HYDROCHLORIDE 0.4 MG/1
0.4 CAPSULE ORAL
COMMUNITY

## 2018-03-14 RX ORDER — ATORVASTATIN CALCIUM 40 MG/1
40 TABLET, FILM COATED ORAL DAILY
Status: ON HOLD | COMMUNITY
End: 2021-09-24 | Stop reason: DRUGHIGH

## 2018-03-14 RX ORDER — TAMSULOSIN HYDROCHLORIDE 0.4 MG/1
0.4 CAPSULE ORAL
Status: DISCONTINUED | OUTPATIENT
Start: 2018-03-15 | End: 2018-03-16 | Stop reason: HOSPADM

## 2018-03-14 RX ORDER — BUDESONIDE AND FORMOTEROL FUMARATE DIHYDRATE 160; 4.5 UG/1; UG/1
2 AEROSOL RESPIRATORY (INHALATION) 2 TIMES DAILY
Status: DISCONTINUED | OUTPATIENT
Start: 2018-03-15 | End: 2018-03-16 | Stop reason: HOSPADM

## 2018-03-14 RX ORDER — SODIUM CHLORIDE 9 MG/ML
75 INJECTION, SOLUTION INTRAVENOUS CONTINUOUS
Status: DISCONTINUED | OUTPATIENT
Start: 2018-03-14 | End: 2018-03-15

## 2018-03-14 RX ORDER — AMLODIPINE BESYLATE 10 MG/1
10 TABLET ORAL DAILY
Status: DISCONTINUED | OUTPATIENT
Start: 2018-03-15 | End: 2018-03-16 | Stop reason: HOSPADM

## 2018-03-14 RX ORDER — FENOFIBRATE 145 MG/1
145 TABLET, COATED ORAL DAILY
Status: CANCELLED | OUTPATIENT
Start: 2018-03-15

## 2018-03-14 RX ORDER — ALBUTEROL SULFATE 2.5 MG/3ML
5 SOLUTION RESPIRATORY (INHALATION) ONCE
Status: COMPLETED | OUTPATIENT
Start: 2018-03-14 | End: 2018-03-14

## 2018-03-14 RX ORDER — ALBUTEROL SULFATE 90 UG/1
2 AEROSOL, METERED RESPIRATORY (INHALATION) EVERY 4 HOURS PRN
Status: DISCONTINUED | OUTPATIENT
Start: 2018-03-14 | End: 2018-03-16 | Stop reason: HOSPADM

## 2018-03-14 RX ORDER — NICOTINE 21 MG/24HR
1 PATCH, TRANSDERMAL 24 HOURS TRANSDERMAL DAILY
Status: DISCONTINUED | OUTPATIENT
Start: 2018-03-15 | End: 2018-03-16 | Stop reason: HOSPADM

## 2018-03-14 RX ORDER — LISINOPRIL AND HYDROCHLOROTHIAZIDE 25; 20 MG/1; MG/1
1 TABLET ORAL DAILY
COMMUNITY
End: 2018-06-26 | Stop reason: HOSPADM

## 2018-03-14 RX ORDER — ASPIRIN 81 MG/1
324 TABLET, CHEWABLE ORAL ONCE
Status: COMPLETED | OUTPATIENT
Start: 2018-03-14 | End: 2018-03-14

## 2018-03-14 RX ORDER — OMEPRAZOLE AND SODIUM BICARBONATE 40; 1100 MG/1; MG/1
1 CAPSULE ORAL EVERY MORNING
Status: DISCONTINUED | OUTPATIENT
Start: 2018-03-15 | End: 2018-03-14 | Stop reason: DRUGHIGH

## 2018-03-14 RX ORDER — AMLODIPINE BESYLATE 10 MG/1
10 TABLET ORAL DAILY
Status: ON HOLD | COMMUNITY
End: 2021-09-24 | Stop reason: DRUGHIGH

## 2018-03-14 RX ORDER — OMEPRAZOLE/SODIUM BICARBONATE 20MG-1.1G
1 CAPSULE ORAL EVERY MORNING
Status: DISCONTINUED | OUTPATIENT
Start: 2018-03-15 | End: 2018-03-16 | Stop reason: HOSPADM

## 2018-03-14 RX ORDER — NITROGLYCERIN 0.4 MG/1
0.4 TABLET SUBLINGUAL
Status: DISCONTINUED | OUTPATIENT
Start: 2018-03-14 | End: 2018-03-16 | Stop reason: HOSPADM

## 2018-03-14 RX ORDER — ACETAMINOPHEN 325 MG/1
650 TABLET ORAL EVERY 4 HOURS PRN
Status: DISCONTINUED | OUTPATIENT
Start: 2018-03-14 | End: 2018-03-16 | Stop reason: HOSPADM

## 2018-03-14 RX ADMIN — SODIUM CHLORIDE 75 ML/HR: 0.9 INJECTION, SOLUTION INTRAVENOUS at 21:46

## 2018-03-14 RX ADMIN — ASPIRIN 81 MG 324 MG: 81 TABLET ORAL at 18:29

## 2018-03-14 RX ADMIN — HEPARIN SODIUM 5000 UNITS: 5000 INJECTION, SOLUTION INTRAVENOUS; SUBCUTANEOUS at 21:47

## 2018-03-14 RX ADMIN — IPRATROPIUM BROMIDE 0.5 MG: 0.5 SOLUTION RESPIRATORY (INHALATION) at 18:02

## 2018-03-14 RX ADMIN — CLONIDINE HYDROCHLORIDE 0.1 MG: 0.1 TABLET ORAL at 21:47

## 2018-03-14 RX ADMIN — ALBUTEROL SULFATE 5 MG: 2.5 SOLUTION RESPIRATORY (INHALATION) at 18:02

## 2018-03-14 NOTE — ED PROVIDER NOTES
History  Chief Complaint   Patient presents with    Chest Pain     Pt reports pain in L side of chest radiating to L arm  Pt reports SOB and "tightness"  Denies cardaic hx       History provided by:  Patient  Chest Pain   Pain location:  L chest  Pain quality: aching    Pain radiates to:  Does not radiate  Pain radiates to the back: no    Pain severity:  Moderate  Onset quality:  Gradual  Duration: several hours  started thus am    Timing:  Constant  Progression:  Resolved  Chronicity:  New  Context comment:  Pt undergoing breathing treatment during examination  lungs cta bl  Relieved by:  Nothing  Worsened by:  Nothing tried  Ineffective treatments:  None tried  Associated symptoms: no abdominal pain, no back pain, no cough, no diaphoresis, no dizziness, no dysphagia, no fatigue, no fever, no heartburn, no lower extremity edema, no nausea, no numbness, no orthopnea, no palpitations, no PND, no shortness of breath, not vomiting and no weakness        Prior to Admission Medications   Prescriptions Last Dose Informant Patient Reported? Taking?    amLODIPine (NORVASC) 10 mg tablet   Yes Yes   Sig: Take 10 mg by mouth daily   aspirin (ECOTRIN LOW STRENGTH) 81 mg EC tablet   Yes Yes   Sig: Take 81 mg by mouth daily   atorvastatin (LIPITOR) 40 mg tablet   Yes Yes   Sig: Take 40 mg by mouth daily   cloNIDine (CATAPRES) 0 2 mg tablet   No No   Sig: Take 0 5 tablets by mouth 2 (two) times a day   fenofibrate (TRICOR) 145 mg tablet   Yes Yes   Sig: Take 145 mg by mouth daily   lisinopril-hydrochlorothiazide (PRINZIDE,ZESTORETIC) 20-25 MG per tablet   Yes Yes   Sig: Take 1 tablet by mouth daily   omeprazole-sodium bicarbonate (ZEGERID)  MG per capsule   Yes Yes   Sig: Take 1 capsule by mouth every morning before breakfast   sitaGLIPtin (JANUVIA) 25 mg tablet   Yes Yes   Sig: Take 25 mg by mouth daily   tamsulosin (FLOMAX) 0 4 mg   Yes Yes   Sig: Take 0 4 mg by mouth daily with dinner   tiZANidine (ZANAFLEX) 4 mg tablet   Yes Yes   Sig: Take 4 mg by mouth daily at bedtime      Facility-Administered Medications: None       Past Medical History:   Diagnosis Date    Diabetes mellitus ([facility] [address] )     Heroin abuse     "20 years" snorting heroin; last use 1 week ago   Hyperlipidemia     Hypertension        History reviewed  No pertinent surgical history  History reviewed  No pertinent family history  I have reviewed and agree with the history as documented  Social History   Substance Use Topics    Smoking status: Current Some Day Smoker     Packs/day: 0 25    Smokeless tobacco: Never Used    Alcohol use No        Review of Systems   Constitutional: Negative for activity change, appetite change, diaphoresis, fatigue and fever  HENT: Negative for congestion, dental problem, ear pain, rhinorrhea, sore throat and trouble swallowing  Eyes: Negative for pain and redness  Respiratory: Negative for cough, chest tightness, shortness of breath and wheezing  Cardiovascular: Positive for chest pain  Negative for palpitations, orthopnea, leg swelling and PND  Gastrointestinal: Negative for abdominal pain, blood in stool, constipation, diarrhea, heartburn, nausea and vomiting  Endocrine: Negative for cold intolerance and heat intolerance  Genitourinary: Negative for dysuria, frequency and hematuria  Musculoskeletal: Negative for arthralgias, back pain and myalgias  Skin: Negative for color change, pallor and rash  Neurological: Negative for dizziness, weakness and numbness  Hematological: Does not bruise/bleed easily  Psychiatric/Behavioral: Negative for agitation, hallucinations and suicidal ideas         Physical Exam  ED Triage Vitals   Temperature Pulse Respirations Blood Pressure SpO2   03/14/18 1745 03/14/18 1744 03/14/18 1744 03/14/18 1745 03/14/18 1744   97 5 °F (36 4 °C) 71 (!) 24 140/67 98 %      Temp Source Heart Rate Source Patient Position - Orthostatic VS BP Location FiO2 (%)   03/14/18 1745 03/14/18 1744 03/14/18 1848 03/14/18 1848 --   Oral Monitor Lying Right arm       Pain Score       03/14/18 1744       6           Orthostatic Vital Signs  Vitals:    03/14/18 1744 03/14/18 1745 03/14/18 1848 03/14/18 2100   BP:  140/67 134/62 134/61   Pulse: 71  75 58   Patient Position - Orthostatic VS:   Lying Lying       Physical Exam   Constitutional: He is oriented to person, place, and time  He appears well-developed and well-nourished  HENT:   Mouth/Throat: No oropharyngeal exudate  TMs normal bilaterally no pharyngeal erythema no rhinorrhea nontender palpation of sinuses, normal looking turbinates   Eyes: Conjunctivae and EOM are normal    Neck: Normal range of motion  Neck supple  No meningeal signs   Cardiovascular: Normal rate, regular rhythm, normal heart sounds and intact distal pulses  Pulmonary/Chest: Effort normal and breath sounds normal  No respiratory distress  He has no wheezes  He has no rales  He exhibits no tenderness  Abdominal: Soft  Bowel sounds are normal  He exhibits no distension and no mass  There is no tenderness  No hernia  No cvat   Musculoskeletal: Normal range of motion  He exhibits no edema  Lymphadenopathy:     He has no cervical adenopathy  Neurological: He is alert and oriented to person, place, and time  No cranial nerve deficit  Skin: No rash noted  No erythema  No edema   Psychiatric: He has a normal mood and affect  His behavior is normal    Nursing note and vitals reviewed        ED Medications  Medications   amLODIPine (NORVASC) tablet 10 mg (not administered)   aspirin (ECOTRIN LOW STRENGTH) EC tablet 81 mg (not administered)   atorvastatin (LIPITOR) tablet 40 mg (not administered)   cloNIDine (CATAPRES) tablet 0 1 mg (0 1 mg Oral Given 3/14/18 2147)   tamsulosin (FLOMAX) capsule 0 4 mg (not administered)   nicotine (NICODERM CQ) 14 mg/24hr TD 24 hr patch 1 patch (not administered)   heparin (porcine) subcutaneous injection 5,000 Units (5,000 Units Subcutaneous Given 3/14/18 2147)   insulin lispro (HumaLOG) 100 units/mL subcutaneous injection 1-5 Units (not administered)   nitroglycerin (NITROSTAT) SL tablet 0 4 mg (not administered)   acetaminophen (TYLENOL) tablet 650 mg (not administered)   sodium chloride 0 9 % infusion (75 mL/hr Intravenous New Bag 3/14/18 2146)   budesonide-formoterol (SYMBICORT) 160-4 5 mcg/act inhaler 2 puff (not administered)   albuterol (PROVENTIL HFA,VENTOLIN HFA) inhaler 2 puff (not administered)   Omeprazole-Sodium Bicarbonate  MG CAPS 1 capsule (not administered)   albuterol inhalation solution 5 mg (5 mg Nebulization Given 3/14/18 1802)   ipratropium (ATROVENT) 0 02 % inhalation solution 0 5 mg (0 5 mg Nebulization Given 3/14/18 1802)   aspirin chewable tablet 324 mg (324 mg Oral Given 3/14/18 1829)       Diagnostic Studies  Results Reviewed     Procedure Component Value Units Date/Time    Rapid drug screen, urine [[id number]]  (Abnormal) Collected:  03/14/18 2003    Lab Status:  Final result Specimen:  Urine from Urine, Clean Catch Updated:  03/14/18 2026     Amph/Meth UR Negative     Barbiturate Ur Negative     Benzodiazepine Urine Negative     Cocaine Urine Negative     Methadone Urine Positive (A)     Opiate Urine Positive (A)     PCP Ur Negative     THC Urine Negative    Narrative:         Presumptive report  If requested, specimen will be sent to reference lab for confirmation  FOR MEDICAL PURPOSES ONLY  IF CONFIRMATION NEEDED PLEASE CONTACT THE LAB WITHIN 5 DAYS      Drug Screen Cutoff Levels:  AMPHETAMINE/METHAMPHETAMINES  1000 ng/mL  BARBITURATES     200 ng/mL  BENZODIAZEPINES     200 ng/mL  COCAINE      300 ng/mL  METHADONE      300 ng/mL  OPIATES      300 ng/mL  PHENCYCLIDINE     25 ng/mL  THC       50 ng/mL    Troponin I [[id number]]  (Normal) Collected:  03/14/18 1758    Lab Status:  Final result Specimen:  Blood from Arm, Left Updated:  03/14/18 1825     Troponin I <0 02 ng/mL     Narrative:         [employer] Chemistry analyzer 99% cutoff is > 0 04 ng/mL in network labs    o cTnI 99% cutoff is useful only when applied to patients in the clinical setting of myocardial ischemia  o cTnI 99% cutoff should be interpreted in the context of clinical history, ECG findings and possibly cardiac imaging to establish correct diagnosis  o cTnI 99% cutoff may be suggestive but clearly not indicative of a coronary event without the clinical setting of myocardial ischemia  Comprehensive metabolic panel [[id number]]  (Abnormal) Collected:  03/14/18 1758    Lab Status:  Final result Specimen:  Blood from Arm, Left Updated:  03/14/18 1822     Sodium 140 mmol/L      Potassium 3 6 mmol/L      Chloride 101 mmol/L      CO2 30 mmol/L      Anion Gap 9 mmol/L      BUN 33 (H) mg/dL      Creatinine 1 32 (H) mg/dL      Glucose 118 mg/dL      Calcium 9 1 mg/dL      AST 19 U/L      ALT 27 U/L      Alkaline Phosphatase 115 U/L      Total Protein 7 4 g/dL      Albumin 3 8 g/dL      Total Bilirubin 0 19 (L) mg/dL      eGFR 54 ml/min/1 73sq m     Narrative:         National Kidney Disease Education Program recommendations are as follows:  GFR calculation is accurate only with a steady state creatinine  Chronic Kidney disease less than 60 ml/min/1 73 sq  meters  Kidney failure less than 15 ml/min/1 73 sq  meters      CBC and differential [[id number]]  (Abnormal) Collected:  03/14/18 1758    Lab Status:  Final result Specimen:  Blood from Arm, Left Updated:  03/14/18 1803     WBC 10 47 (H) Thousand/uL      RBC 4 19 Million/uL      Hemoglobin 12 6 g/dL      Hematocrit 38 8 %      MCV 93 fL      MCH 30 1 pg      MCHC 32 5 g/dL      RDW 14 4 %      MPV 13 1 (H) fL      Platelets 260 Thousands/uL      nRBC 0 /100 WBCs      Neutrophils Relative 60 %      Lymphocytes Relative 28 %      Monocytes Relative 9 %      Eosinophils Relative 3 %      Basophils Relative 0 %      Neutrophils Absolute 6 28 Thousands/µL      Lymphocytes Absolute 2 94 Thousands/µL Monocytes Absolute 0 93 Thousand/µL      Eosinophils Absolute 0 29 Thousand/µL      Basophils Absolute 0 03 Thousands/µL                  X-ray chest 2 views   ED Interpretation by Sunshine Gomez MD (03/14 1905)   Primary reviewed: no acute abnormality      Final Result by Mathew Elizondo DO (03/14 1947)      No acute cardiopulmonary disease              Workstation performed: [id number]                    Procedures  ECG 12 Lead Documentation  Date/Time: 3/14/2018 6:01 PM  Performed by: Starla Blood by: Ximena MAGALLON     ECG reviewed by me, the ED Provider: yes    Patient location:  ED  Rate:     ECG rate:  78    ECG rate assessment: normal    Rhythm:     Rhythm: sinus rhythm    Ectopy:     Ectopy: none    QRS:     QRS axis:  Normal    QRS intervals:  Normal  Conduction:     Conduction: abnormal      Abnormal conduction: 2nd degree (Mobitz 2)    ST segments:     ST segments:  Normal  T waves:     T waves: normal             Phone Contacts  ED Phone Contact    ED Course  ED Course          HEART Risk Score    Flowsheet Row Most Recent Value   History  0 Filed at: 03/14/2018 1902   ECG  0 Filed at: 03/14/2018 1902   Age  2 Filed at: 03/14/2018 1902   Risk Factors  2 Filed at: 03/14/2018 1902   Troponin  0 Filed at: 03/14/2018 1902   Heart Score Risk Calculator   History  0 Filed at: 03/14/2018 1902   ECG  0 Filed at: 03/14/2018 1902   Age  2 Filed at: 03/14/2018 1902   Risk Factors  2 Filed at: 03/14/2018 1902   Troponin  0 Filed at: 03/14/2018 1902   HEART Score  4 Filed at: 03/14/2018 1902   HEART Score  4 Filed at: 03/14/2018 1902                    MARGE Risk Score    Flowsheet Row Most Recent Value   Age >= 72  1 Filed at: 03/14/2018 2008   Known CAD (stenosis >= 50%)  0 Filed at: 03/14/2018 2008   Recent (<=24 hrs) Service Angina  1 Filed at: 03/14/2018 2008   ST Deviation >= 0 5 mm  0 Filed at: 03/14/2018 2008   3+ CAD Risk Factors (FHx, HTN, HLP, DM, Smoker)  1 Filed at: 03/14/2018 2008   Aspirin Use Past 7 Days  1 Filed at: 03/14/2018 2008   Elevated Cardiac Markers  0 Filed at: 03/14/2018 2008   MARGE Risk Score (Calculated)  4 Filed at: 03/14/2018 2008              [facility]  Number of Diagnoses or Management Options  Diagnosis management comments: CP-will do cxr, ekg, cardiac labs, asa, admit  CritCare Time    Disposition  Final diagnoses:   Chest pain, rule out acute myocardial infarction     Time reflects when diagnosis was documented in both MDM as applicable and the Disposition within this note     Time User Action Codes Description Comment    3/14/2018  7:28 PM Vianey Valenzuela Add [R07 9] Chest pain, rule out acute myocardial infarction       ED Disposition     ED Disposition Condition Comment    Admit  Case was discussed with maria a and the patient's admission status was agreed to be Admission Status: observation status to the service of Dr Cleopatra Rosas   Follow-up Information    None       Current Discharge Medication List      CONTINUE these medications which have NOT CHANGED    Details   amLODIPine (NORVASC) 10 mg tablet Take 10 mg by mouth daily      aspirin (ECOTRIN LOW STRENGTH) 81 mg EC tablet Take 81 mg by mouth daily      atorvastatin (LIPITOR) 40 mg tablet Take 40 mg by mouth daily      fenofibrate (TRICOR) 145 mg tablet Take 145 mg by mouth daily      lisinopril-hydrochlorothiazide (PRINZIDE,ZESTORETIC) 20-25 MG per tablet Take 1 tablet by mouth daily      omeprazole-sodium bicarbonate (ZEGERID)  MG per capsule Take 1 capsule by mouth every morning before breakfast      sitaGLIPtin (JANUVIA) 25 mg tablet Take 25 mg by mouth daily      tamsulosin (FLOMAX) 0 4 mg Take 0 4 mg by mouth daily with dinner      tiZANidine (ZANAFLEX) 4 mg tablet Take 4 mg by mouth daily at bedtime      cloNIDine (CATAPRES) 0 2 mg tablet Take 0 5 tablets by mouth 2 (two) times a day  Qty: 6 tablet, Refills: 0           No discharge procedures on file      ED Provider  Electronically Signed by           Alvarez Calle MD  03/14/18 6740

## 2018-03-15 LAB
ANION GAP SERPL CALCULATED.3IONS-SCNC: 8 MMOL/L (ref 4–13)
ATRIAL RATE: 55 BPM
BACTERIA UR QL AUTO: ABNORMAL /HPF
BASOPHILS # BLD AUTO: 0.04 THOUSANDS/ΜL (ref 0–0.1)
BASOPHILS NFR BLD AUTO: 1 % (ref 0–1)
BILIRUB UR QL STRIP: NEGATIVE
BUN SERPL-MCNC: 24 MG/DL (ref 5–25)
CALCIUM SERPL-MCNC: 8.7 MG/DL (ref 8.3–10.1)
CHLORIDE SERPL-SCNC: 104 MMOL/L (ref 100–108)
CHOLEST SERPL-MCNC: 133 MG/DL (ref 50–200)
CLARITY UR: CLEAR
CO2 SERPL-SCNC: 27 MMOL/L (ref 21–32)
COLOR UR: YELLOW
CREAT SERPL-MCNC: 1.1 MG/DL (ref 0.6–1.3)
CREAT UR-MCNC: 71.2 MG/DL
EOSINOPHIL # BLD AUTO: 0.42 THOUSAND/ΜL (ref 0–0.61)
EOSINOPHIL NFR BLD AUTO: 5 % (ref 0–6)
ERYTHROCYTE [DISTWIDTH] IN BLOOD BY AUTOMATED COUNT: 14.8 % (ref 11.6–15.1)
EST. AVERAGE GLUCOSE BLD GHB EST-MCNC: 143 MG/DL
GFR SERPL CREATININE-BSD FRML MDRD: 67 ML/MIN/1.73SQ M
GLUCOSE P FAST SERPL-MCNC: 91 MG/DL (ref 65–99)
GLUCOSE SERPL-MCNC: 125 MG/DL (ref 65–140)
GLUCOSE SERPL-MCNC: 164 MG/DL (ref 65–140)
GLUCOSE SERPL-MCNC: 164 MG/DL (ref 65–140)
GLUCOSE SERPL-MCNC: 86 MG/DL (ref 65–140)
GLUCOSE SERPL-MCNC: 91 MG/DL (ref 65–140)
GLUCOSE UR STRIP-MCNC: NEGATIVE MG/DL
HBA1C MFR BLD: 6.6 % (ref 4.2–6.3)
HCT VFR BLD AUTO: 35.8 % (ref 36.5–49.3)
HDLC SERPL-MCNC: 38 MG/DL (ref 40–60)
HGB BLD-MCNC: 11.7 G/DL (ref 12–17)
HGB UR QL STRIP.AUTO: NEGATIVE
KETONES UR STRIP-MCNC: NEGATIVE MG/DL
LDLC SERPL CALC-MCNC: 73 MG/DL (ref 0–100)
LEUKOCYTE ESTERASE UR QL STRIP: NEGATIVE
LYMPHOCYTES # BLD AUTO: 2.66 THOUSANDS/ΜL (ref 0.6–4.47)
LYMPHOCYTES NFR BLD AUTO: 30 % (ref 14–44)
MCH RBC QN AUTO: 30.2 PG (ref 26.8–34.3)
MCHC RBC AUTO-ENTMCNC: 32.7 G/DL (ref 31.4–37.4)
MCV RBC AUTO: 93 FL (ref 82–98)
MONOCYTES # BLD AUTO: 0.71 THOUSAND/ΜL (ref 0.17–1.22)
MONOCYTES NFR BLD AUTO: 8 % (ref 4–12)
NEUTROPHILS # BLD AUTO: 4.94 THOUSANDS/ΜL (ref 1.85–7.62)
NEUTS SEG NFR BLD AUTO: 56 % (ref 43–75)
NITRITE UR QL STRIP: NEGATIVE
NON-SQ EPI CELLS URNS QL MICRO: ABNORMAL /HPF
NRBC BLD AUTO-RTO: 0 /100 WBCS
P AXIS: 71 DEGREES
PH UR STRIP.AUTO: 5.5 [PH] (ref 4.5–8)
PLATELET # BLD AUTO: 133 THOUSANDS/UL (ref 149–390)
PLATELET # BLD AUTO: 139 THOUSANDS/UL (ref 149–390)
PMV BLD AUTO: 12.8 FL (ref 8.9–12.7)
PMV BLD AUTO: 12.9 FL (ref 8.9–12.7)
POTASSIUM SERPL-SCNC: 4.2 MMOL/L (ref 3.5–5.3)
PR INTERVAL: 170 MS
PROT UR STRIP-MCNC: NEGATIVE MG/DL
PROT UR-MCNC: 6 MG/DL
PROT/CREAT UR: 0.08 MG/G{CREAT} (ref 0–0.1)
QRS AXIS: 66 DEGREES
QRSD INTERVAL: 90 MS
QT INTERVAL: 418 MS
QTC INTERVAL: 399 MS
RBC # BLD AUTO: 3.87 MILLION/UL (ref 3.88–5.62)
RBC #/AREA URNS AUTO: ABNORMAL /HPF
SODIUM SERPL-SCNC: 139 MMOL/L (ref 136–145)
SP GR UR STRIP.AUTO: 1.01 (ref 1–1.03)
T WAVE AXIS: 64 DEGREES
TRIGL SERPL-MCNC: 110 MG/DL
TROPONIN I SERPL-MCNC: <0.02 NG/ML
UROBILINOGEN UR QL STRIP.AUTO: 0.2 E.U./DL
VENTRICULAR RATE: 55 BPM
WBC # BLD AUTO: 8.77 THOUSAND/UL (ref 4.31–10.16)
WBC #/AREA URNS AUTO: ABNORMAL /HPF

## 2018-03-15 PROCEDURE — 84484 ASSAY OF TROPONIN QUANT: CPT | Performed by: PHYSICIAN ASSISTANT

## 2018-03-15 PROCEDURE — 82948 REAGENT STRIP/BLOOD GLUCOSE: CPT

## 2018-03-15 PROCEDURE — 83036 HEMOGLOBIN GLYCOSYLATED A1C: CPT | Performed by: PHYSICIAN ASSISTANT

## 2018-03-15 PROCEDURE — 80061 LIPID PANEL: CPT | Performed by: PHYSICIAN ASSISTANT

## 2018-03-15 PROCEDURE — 99225 PR SBSQ OBSERVATION CARE/DAY 25 MINUTES: CPT | Performed by: INTERNAL MEDICINE

## 2018-03-15 PROCEDURE — 93010 ELECTROCARDIOGRAM REPORT: CPT | Performed by: INTERNAL MEDICINE

## 2018-03-15 PROCEDURE — 85025 COMPLETE CBC W/AUTO DIFF WBC: CPT | Performed by: PHYSICIAN ASSISTANT

## 2018-03-15 PROCEDURE — 80048 BASIC METABOLIC PNL TOTAL CA: CPT | Performed by: PHYSICIAN ASSISTANT

## 2018-03-15 PROCEDURE — 85049 AUTOMATED PLATELET COUNT: CPT | Performed by: PHYSICIAN ASSISTANT

## 2018-03-15 RX ORDER — METHADONE HYDROCHLORIDE 10 MG/ML
70 CONCENTRATE ORAL DAILY
Status: DISCONTINUED | OUTPATIENT
Start: 2018-03-15 | End: 2018-03-16 | Stop reason: HOSPADM

## 2018-03-15 RX ADMIN — HEPARIN SODIUM 5000 UNITS: 5000 INJECTION, SOLUTION INTRAVENOUS; SUBCUTANEOUS at 05:41

## 2018-03-15 RX ADMIN — NICOTINE 1 PATCH: 14 PATCH TRANSDERMAL at 08:14

## 2018-03-15 RX ADMIN — BUDESONIDE AND FORMOTEROL FUMARATE DIHYDRATE 2 PUFF: 160; 4.5 AEROSOL RESPIRATORY (INHALATION) at 09:00

## 2018-03-15 RX ADMIN — ATORVASTATIN CALCIUM 40 MG: 40 TABLET, FILM COATED ORAL at 17:23

## 2018-03-15 RX ADMIN — HEPARIN SODIUM 5000 UNITS: 5000 INJECTION, SOLUTION INTRAVENOUS; SUBCUTANEOUS at 15:37

## 2018-03-15 RX ADMIN — TAMSULOSIN HYDROCHLORIDE 0.4 MG: 0.4 CAPSULE ORAL at 15:33

## 2018-03-15 RX ADMIN — CLONIDINE HYDROCHLORIDE 0.1 MG: 0.1 TABLET ORAL at 08:14

## 2018-03-15 RX ADMIN — HEPARIN SODIUM 5000 UNITS: 5000 INJECTION, SOLUTION INTRAVENOUS; SUBCUTANEOUS at 22:50

## 2018-03-15 RX ADMIN — Medication 1 CAPSULE: at 08:14

## 2018-03-15 RX ADMIN — BUDESONIDE AND FORMOTEROL FUMARATE DIHYDRATE 2 PUFF: 160; 4.5 AEROSOL RESPIRATORY (INHALATION) at 17:23

## 2018-03-15 RX ADMIN — CLONIDINE HYDROCHLORIDE 0.1 MG: 0.1 TABLET ORAL at 17:23

## 2018-03-15 RX ADMIN — AMLODIPINE BESYLATE 10 MG: 10 TABLET ORAL at 08:15

## 2018-03-15 RX ADMIN — SODIUM CHLORIDE 75 ML/HR: 0.9 INJECTION, SOLUTION INTRAVENOUS at 12:59

## 2018-03-15 RX ADMIN — HYDROCHLOROTHIAZIDE: 25 TABLET ORAL at 15:32

## 2018-03-15 RX ADMIN — ASPIRIN 81 MG: 81 TABLET, COATED ORAL at 08:15

## 2018-03-15 NOTE — ASSESSMENT & PLAN NOTE
ACS rule out  Atypical and nonexertional chest pain course her last 48 hours and history of intermittent chest discomfort over the last month 1 minutes to observation status  MARGE score is 4  EKG demonstrates sinus rhythm with occasional PAB    No ST depressions or T wave inversions in 2+ leads  Troponin is negative  CXR demonstrates no widened mediastinum, no vascular congestion or infiltrate  Prior cardiac history/work up:  No history of stress test per patient's recall or cardiac catheterization  Patient received ASA 325mh in ER/pre-hospital setting  Will admit to observation on telemetry, trend troponins and EKGs, check full lipid panel and hgb a1c  Further cardiac testing: op nm stress test if rule out for acs  Consults:  none

## 2018-03-15 NOTE — ASSESSMENT & PLAN NOTE
2* prior heroin use  Confirm w/drug rehab program pt's current dose in AM, number in sticky under treatment team  Check UDS

## 2018-03-15 NOTE — CASE MANAGEMENT
Notification of Observation Care Status/Observation Authorization Request  This is a Notification of Observation Care Status to our facility [address] [place] [address]  Please be advised that this patient is currently in our facility under Observation Status  Below you will find the Attending Physician and Facilitys information including NPI# and contact information for the Utilization  assigned to the [facility] where the patient is receiving services  Please feel free to contact the Utilization Review Department with any questions  Patient Information:  PATIENT NAME: Giselle Eldridge  MRN: [id number]  CATHY: [unfilled]  YOB: 1945    PRESENTATION DATE/TIME: 3/14/2018  5:42 PM  OBS ADMISSION DATE/TIME: [unfilled]  DISCHARGE DATE/TIME: No discharge date for patient encounter  DISPOSITION: Home/Self Care    Attending Physician:    STUART Nava  Specialty- HospitalistGale ID- [id number]     Primary Office:  [address]  Phone 1: [phone]  Phone 2:   Fax: [phone]   Facility:  [address] [place], [place] [id number]  Tax ID: [id number]  NPI: [id number]    [address] [place] [address] in the [facility] by Surya Marquez for 2017  Network Utilization Review Department  Phone: [phone]; Fax [phone]  ATTENTION: The Network Utilization Review Department is now centralized for our 7 Facilities  Make a note that we have a new phone and fax numbers for our Department  Please call with any questions or concerns to [phone] and carefully follow the prompts so that you are directed to the right person  All voicemails are confidential  Fax any determinations, approvals, denials, and requests for initial or continue stay review clinical to [phone]   Due to HIGH CALL volume, it would be easier if you could please send faxed requests to expedite your requests and in part, help us provide discharge notifications faster

## 2018-03-15 NOTE — ASSESSMENT & PLAN NOTE
Creatinine of 1 3 in diabetic w/unknown baseline  Pt appears mildly dehydrated and is on lisinopril/hctz  Will hold ACE-HCTZ  Check UA w/microscopic check UPC as pt may have underlying CKD  Will gently hydrate w/75 cc/hr NS IVF  Repeat bmp in AM

## 2018-03-15 NOTE — CASE MANAGEMENT
Initial Clinical Review    Admission: Date/Time/Statement:  OBS 3/14 @ 1930    Orders Placed This Encounter   Procedures    Place in Observation (expected length of stay for this patient is less than two midnights)     Standing Status:   Standing     Number of Occurrences:   1     Order Specific Question:   Admitting Physician     Answer:   Jose D President [[id number]]     Order Specific Question:   Level of Care     Answer:   Med Surg [16]       ED: Date/Time/Mode of Arrival:   ED Arrival Information     Expected Arrival Acuity Means of Arrival Escorted By Service Admission Type    - 3/14/2018 17:38 Urgent Walk-In Self General Medicine Urgent    Arrival Complaint    CHEST PAIN         Chief Complaint:   Chief Complaint   Patient presents with    Chest Pain     Pt reports pain in L side of chest radiating to L arm  Pt reports SOB and "tightness"  Denies cardaic hx       History of Illness:  71 y o  male who presents with PMH of hypertension, hyperlipidemia, diabetes, current smoker coming the ER for acute onset chest pain over the last 2 days  Patient is Greek-speaking and English-speaking  A interview occurred between the patient and myself in [place] and [facility] [place]  The patient reports over the last month he has had episodic chest pain which is left-sided nonradiating and short duration of 1 2 minutes  They were minor and discomfort occurred at rest so the patient did not think anything of it  Over the last 2 days the patient noticed that the chest pain was occurring more frequently and with increased severity and felt like it was sharp and pulsing worse with deep breath and it made it feel like it was difficult to take a deep breath  If still last 1-2 minutes and occurred at rest   Was not associated with exertion as the patient walks approximately 2 blocks outside 3 to 4 times a week  Has not occurred with lifting things in the home  Is not occurred with meals or and 1 particular position over no other    The patient did not take anything for this pain at home  The patient noticed the pain yesterday mid day while he was watching TV  It then resolved but this evening he again had pain which was still worse in severity than prior episodes over the last month prompting him to come in      He has a chronic cough in the AM productive of clear /white sputum which is unchanged  No sore throat URI s/sx fevers or chills  No hx of stress test or cardiac cath or heart disease by patient's report  He does have a hx of heroin use and reports he has been doing well w/compliance w/his methadone clinic  He is currently smokign 1/2 ppd of cigarettes, however  ED Vital Signs:   ED Triage Vitals   Temperature Pulse Respirations Blood Pressure SpO2   03/14/18 1745 03/14/18 1744 03/14/18 1744 03/14/18 1745 03/14/18 1744   97 5 °F (36 4 °C) 71 (!) 24 140/67 98 %      Temp Source Heart Rate Source Patient Position - Orthostatic VS BP Location FiO2 (%)   03/14/18 1745 03/14/18 1744 03/14/18 1848 03/14/18 1848 --   Oral Monitor Lying Right arm       Pain Score       03/14/18 1744       6        Wt Readings from Last 1 Encounters:   03/14/18 76 8 kg (169 lb 5 oz)       Abnormal Labs/Diagnostic Test Results:  Bun 33,   Cr 1 32,   Wbc's 10 47  CXR: No acute cardiopulmonary disease  EKG: Sinus rhythm pacs    ED Treatment:   Medication Administration from 03/14/2018 1738 to 03/14/2018 2029       Date/Time Order Dose Route Action Action by Comments     03/14/2018 1802 albuterol inhalation solution 5 mg 5 mg Nebulization Given Abida Snaders RN      03/14/2018 1802 ipratropium (ATROVENT) 0 02 % inhalation solution 0 5 mg 0 5 mg Nebulization Given Abida Sanders RN      03/14/2018 1829 aspirin chewable tablet 324 mg 324 mg Oral Given Abida Sanders RN           Past Medical/Surgical History:    Active Ambulatory Problems     Diagnosis Date Noted    No Active Ambulatory Problems     Resolved Ambulatory Problems     Diagnosis Date Noted  No Resolved Ambulatory Problems     Past Medical History:   Diagnosis Date    Diabetes mellitus ([facility] )     Heroin abuse     Hyperlipidemia     Hypertension        Admitting Diagnosis: Chest pain [R07 9]  Chest pain, rule out acute myocardial infarction [R07 9]    Age/Sex: 71 y o  male    Assessment/Plan:   Principal Problem:    Chest pain  Active Problems:    Renal disease    Methadone use (HCC)    HTN (hypertension)    HLD (hyperlipidemia)    Diabetes mellitus (HCC)         * Chest pain   Assessment & Plan     ACS rule out  Atypical and nonexertional chest pain course her last 48 hours and history of intermittent chest discomfort over the last month 1 minutes to observation status  MARGE score is 4  EKG demonstrates sinus rhythm with occasional PAB    No ST depressions or T wave inversions in 2+ leads  Troponin is negative  CXR demonstrates no widened mediastinum, no vascular congestion or infiltrate  Prior cardiac history/work up:  No history of stress test per patient's recall or cardiac catheterization  Patient received ASA 325mh in ER/pre-hospital setting  Will admit to observation on telemetry, trend troponins and EKGs, check full lipid panel and hgb a1c  Further cardiac testing: op nm stress test if rule out for acs  Consults:  none                   Renal disease   Assessment & Plan     Creatinine of 1 3 in diabetic w/unknown baseline  Pt appears mildly dehydrated and is on lisinopril/hctz  Will hold ACE-HCTZ  Check UA w/microscopic check UPC as pt may have underlying CKD  Will gently hydrate w/75 cc/hr NS IVF  Repeat bmp in AM          Methadone use ([facility] )   Assessment & Plan     2* prior heroin use  Confirm w/drug rehab program pt's current dose in AM, number in sticky under treatment team  Check UDS          Diabetes mellitus ([facility] )   Assessment & Plan     NIDDM  BS here 118  Continue januvia  Check hgb a1c start pos bs and SSI          HLD (hyperlipidemia)   Assessment & Plan     Continue statin, hold tricor          HTN (hypertension)   Assessment & Plan     Hold ace/hctz  Continue catapres/norvasc  Continue to monitor             · Current smoker  ? Cessation encouraged, patch provided     COPD         Pt previously on symbicort per review of OP records at [facility]         Will continue         Start albuterol prn sob     Left neck pain               Stable unassociated w/cp               Hold zanaflex for now pending UDS               Continue APAP prn     VTE Prophylaxis: Heparin  / reason for no mechanical VTE prophylaxis     Code Status: Full Code  POLST: reason for no mechanical VTE prophylaxis    Anticipated Length of Stay:  Patient will be admitted on an Observation basis with an anticipated length of stay of  Less than 2 midnights     Justification for Hospital Stay: chest pain rule out    Admission Orders:  Obs  TELE  EKG with 2nd and 3rd trops  EKG with CP or ST changes  Cont trops x 3  Urine pro/cr ratio    Scheduled Meds:   Current Facility-Administered Medications:  acetaminophen 650 mg Oral Q4H PRN Sun Kan PA-C    albuterol 2 puff Inhalation Q4H PRN Sun Kan PA-C    amLODIPine 10 mg Oral Daily Sun Kan PA-C    aspirin 81 mg Oral Daily Sun Kan PA-C    atorvastatin 40 mg Oral Daily Sun Kan PA-C    budesonide-formoterol 2 puff Inhalation BID Sun Kan PA-C    cloNIDine 0 1 mg Oral BID Sun Kan PA-C    heparin (porcine) 5,000 Units Subcutaneous Q8H [facility] Sun Kan PA-C    insulin lispro 1-5 Units Subcutaneous 4x Daily (AC & HS) Sun Kan PA-C    nicotine 1 patch Transdermal Daily Sun Kan PA-C    nitroglycerin 0 4 mg Sublingual Q5 Min PRN Sun Kan PA-C    Omeprazole-Sodium Bicarbonate 1 capsule Oral QAM Sun Kan PA-C    sodium chloride 75 mL/hr Intravenous Continuous Sun Kan PA-C Last Rate: 75 mL/hr (03/14/18 2140)   tamsulosin 0 4 mg Oral Daily With [employer] M DEMETRIS Kan      Continuous Infusions:   sodium chloride 75 mL/hr Last Rate: 75 mL/hr (03/14/18 2146)     PRN Meds:   acetaminophen    albuterol    nitroglycerin    3/15:  96 9 - 59 - 18   147/74   RA 95%  + HYATT  trops neg x 3  Abn labs:  H&H 11 7 / 35 8,   Plats 133  Chest Pain with has significant risk factors including age, diabetes mellitus, hypertension, hyperlipidemia  -c/w ASA and statin  -given significant cardiac risk factors and chest pain, will proceed with a nuclear stress test  -c/w telemetry monitoring  PERFUSION STRESS TEST ORDERED  EKG,  CBC,  BMP  Methadone 70 po qd  Prinzide 20/25 combo dose po qd      Thank you,  [address] [place] [address] in the [facility] by Surya Mraquez for 2017  Network Utilization Review Department  Phone: [phone]; Fax [phone]  ATTENTION: The Network Utilization Review Department is now centralized for our 7 Facilities  Make a note that we have a new phone and fax numbers for our Department  Please call with any questions or concerns to [phone] and carefully follow the prompts so that you are directed to the right person  All voicemails are confidential  Fax any determinations, approvals, denials, and requests for initial or continue stay review clinical to [phone]  Due to HIGH CALL volume, it would be easier if you could please send faxed requests to expedite your requests and in part, help us provide discharge notifications faster

## 2018-03-15 NOTE — H&P
H&P- Jill Rosa 1945, 71 y o  male MRN: [id number]    Unit/Bed#: SHALONDA Encounter: [id number]    Primary Care Provider: Johnetta Homans, MD   Date and time admitted to hospital: 3/14/2018  5:42 PM    History and Physical - [facility]    Patient Information: Jill Rosa 71 y o  male MRN: [id number]  Unit/Bed#: SHALONDA Encounter: [id number]  Admitting Physician: Edita Forbes PA-C  PCP: Johnetta Homans, MD  Date of Admission:  03/14/18    Assessment/Plan:    Hospital Problem List:     Principal Problem:    Chest pain  Active Problems:    Renal disease    M[facility]adone use ([facility] )    HTN (hypertension)    HLD (hyperlipidemia)    Diabetes mellitus ([facility] )        * Chest pain   Assessment & Plan    ACS rule out  Atypical and nonexertional chest pain course her last 48 hours and history of intermittent chest discomfort over the last month 1 minutes to observation status  MARGE score is 4  EKG demonstrates sinus rhythm with occasional PAB    No ST depressions or T wave inversions in 2+ leads  Troponin is negative  CXR demonstrates no widened mediastinum, no vascular congestion or infiltrate  Prior cardiac history/work up:  No history of stress test per patient's recall or cardiac catheterization  Patient received ASA 325mh in ER/pre-hospital setting  Will admit to observation on telemetry, trend troponins and EKGs, check full lipid panel and hgb a1c  Further cardiac testing: op nm stress test if rule out for acs  Consults:  none              Renal disease   Assessment & Plan    Creatinine of 1 3 in diabetic w/unknown baseline  Pt appears mildly dehydrated and is on lisinopril/hctz  Will hold ACE-HCTZ  Check UA w/microscopic check UPC as pt may have underlying CKD  Will gently hydrate w/75 cc/hr NS IVF  Repeat bmp in AM        Methadone use ([facility] )   Assessment & Plan    2* prior heroin use  Confirm w/drug rehab program pt's current dose in AM, number in sticky under treatment team  Check UDS Diabetes mellitus ([facility] )   Assessment & Plan    NIDDM  BS here 118  Continue januvia  Check hgb a1c start pos bs and SSI        HLD (hyperlipidemia)   Assessment & Plan    Continue statin, hold tricor        HTN (hypertension)   Assessment & Plan    Hold ace/hctz  Continue catapres/norvasc  Continue to monitor          · Current smoker  · Cessation encouraged, patch provided    COPD   Pt previously on symbicort per review of OP records at [facility]   Will continue   Start albuterol prn sob    Left neck pain   Stable unassociated w/cp   Hold zanaflex for now pending UDS   Continue APAP prn    VTE Prophylaxis: Heparin  / reason for no mechanical VTE prophylaxis     Code Status: Full Code  POLST: reason for no mechanical VTE prophylaxis      Anticipated Length of Stay:  Patient will be admitted on an Observation basis with an anticipated length of stay of  Less than 2 midnights  Justification for Hospital Stay: chest pain rule out    Total Time for Visit, including Counseling / Coordination of Care: 30 minutes  Greater than 50% of this total time spent on direct patient counseling and coordination of care  Chief Complaint:   Chest pain    History of Present Illness: Itz Espana is a 71 y o  male who presents with PMH of hypertension, hyperlipidemia, diabetes, current smoker coming the ER for acute onset chest pain over the last 2 days  Patient is Yoruba-speaking and English-speaking  A interview occurred between the patient and myself [place]  The patient reports over the last month he has had episodic chest pain which is left-sided nonradiating and short duration of 1 2 minutes  They were minor and discomfort occurred at rest so the patient did not think anything of it    Over the last 2 days the patient noticed that the chest pain was occurring more frequently and with increased severity and felt like it was sharp and pulsing worse with deep breath and it made it feel like it was difficult to take a deep breath  If still last 1-2 minutes and occurred at rest   Was not associated with exertion as the patient walks approximately 2 blocks outside 3 to 4 times a week  Has not occurred with lifting things in the home  Is not occurred with meals or and 1 particular position over no other  The patient did not take anything for this pain at home  The patient noticed the pain yesterday mid day while he was watching TV  It then resolved but this evening he again had pain which was still worse in severity than prior episodes over the last month prompting him to come in  He has a chronic cough in the AM productive of clear /white sputum which is unchanged  No sore throat URI s/sx fevers or chills  No hx of stress test or cardiac cath or heart disease by patient's report  He does have a hx of heroin use and reports he has been doing well w/compliance w/his methadone clinic  He is currently smokign 1/2 ppd of cigarettes, however  Review of Systems:    Review of Systems   Constitutional: Negative for appetite change, chills and fever  HENT: Negative for congestion and sore throat  Eyes: Negative for photophobia and visual disturbance  Respiratory: Positive for cough and shortness of breath  Cardiovascular: Positive for chest pain  Negative for palpitations  Gastrointestinal: Negative for abdominal pain, nausea and vomiting  All other systems reviewed and are negative  Past Medical and Surgical History:     Past Medical History:   Diagnosis Date    Diabetes mellitus ([address] )     Heroin abuse     "20 years" snorting heroin; last use 1 week ago   Hyperlipidemia     Hypertension        History reviewed  No pertinent surgical history  Meds/Allergies:    Prior to Admission medications    Medication Sig Start Date End Date Taking?  Authorizing Provider   amLODIPine (NORVASC) 10 mg tablet Take 10 mg by mouth daily   Yes Historical Provider, MD   aspirin (ECOTRIN LOW STRENGTH) 81 mg EC tablet Take 81 mg by mouth daily   Yes Historical Provider, MD   atorvastatin (LIPITOR) 40 mg tablet Take 40 mg by mouth daily   Yes Historical Provider, MD   fenofibrate (TRICOR) 145 mg tablet Take 145 mg by mouth daily   Yes Historical Provider, MD   lisinopril-hydrochlorothiazide (PRINZIDE,ZESTORETIC) 20-25 MG per tablet Take 1 tablet by mouth daily   Yes Historical Provider, MD   omeprazole-sodium bicarbonate (ZEGERID)  MG per capsule Take 1 capsule by mouth every morning before breakfast   Yes Historical Provider, MD   sitaGLIPtin (JANUVIA) 25 mg tablet Take 25 mg by mouth daily   Yes Historical Provider, MD   tamsulosin (FLOMAX) 0 4 mg Take 0 4 mg by mouth daily with dinner   Yes Historical Provider, MD   tiZANidine (ZANAFLEX) 4 mg tablet Take 4 mg by mouth daily at bedtime   Yes Historical Provider, MD   cloNIDine (CATAPRES) 0 2 mg tablet Take 0 5 tablets by mouth 2 (two) times a day 10/27/17   Selene Wren MD   ondansetron (ZOFRAN-ODT) 4 mg disintegrating tablet Take 1 tablet by mouth every 6 (six) hours as needed for nausea or vomiting 10/27/17 3/14/18  Selene Wren MD     I have reviewed home medications with patient personally  Allergies: No Known Allergies    Social History:     Marital Status: /Civil Union   Occupation:   Patient Pre-hospital Living Situation:   Patient Pre-hospital Level of Mobility:   Patient Pre-hospital Diet Restrictions:   Substance Use History:   History   Alcohol Use No     History   Smoking Status    Current Some Day Smoker    Packs/day: 0 25   Smokeless Tobacco    Never Used     History   Drug Use No       Family History:    History reviewed  No pertinent family history      Physical Exam:     Vitals:   Blood Pressure: 134/62 (03/14/18 1848)  Pulse: 75 (03/14/18 1848)  Temperature: 97 5 °F (36 4 °C) (03/14/18 1745)  Temp Source: Oral (03/14/18 1745)  Respirations: 16 (03/14/18 1848)  SpO2: 97 % (03/14/18 1848)    Physical Exam Constitutional: He appears well-developed and well-nourished  No distress  HENT:   Head: Normocephalic and atraumatic  Right Ear: External ear normal    Left Ear: External ear normal    Eyes: Conjunctivae are normal  Right eye exhibits no discharge  Left eye exhibits no discharge  No scleral icterus  Neck: Normal range of motion  Cardiovascular: Normal rate, regular rhythm, normal heart sounds and intact distal pulses  Exam reveals no gallop and no friction rub  No murmur heard  Pulmonary/Chest: Effort normal  No respiratory distress  He has no wheezes  He has no rales  He exhibits no tenderness  RA  No wheezing, wob tripoding   Abdominal: Soft  He exhibits no distension  There is no tenderness  There is no rebound and no guarding  Musculoskeletal: He exhibits tenderness (ttp of left lateral neck soft tissue)   strength equal b/l     Neurological: He is alert  Skin: Skin is warm  He is not diaphoretic  Psychiatric: He has a normal mood and affect  Vitals reviewed  (  Be Sure to Include Physical Exam: Delete this entire line when you have entered your exam)    Additional Data:     Lab Results: I have personally reviewed pertinent reports          Results from last 7 days  Lab Units 03/14/18  1758   WBC Thousand/uL 10 47*   HEMOGLOBIN g/dL 12 6   HEMATOCRIT % 38 8   PLATELETS Thousands/uL 159   NEUTROS PCT % 60   LYMPHS PCT % 28   MONOS PCT % 9   EOS PCT % 3       Results from last 7 days  Lab Units 03/14/18  1758   SODIUM mmol/L 140   POTASSIUM mmol/L 3 6   CHLORIDE mmol/L 101   CO2 mmol/L 30   BUN mg/dL 33*   CREATININE mg/dL 1 32*   CALCIUM mg/dL 9 1   TOTAL PROTEIN g/dL 7 4   BILIRUBIN TOTAL mg/dL 0 19*   ALK PHOS U/L 115   ALT U/L 27   AST U/L 19   GLUCOSE RANDOM mg/dL 118           Imaging: I have personally reviewed pertinent reports   , I have personally reviewed pertinent films in PACS and no vascular congestion infiltrate ore widened mediastinum    X-ray Chest 2 Views    Result Date: 3/14/2018  Narrative: CHEST INDICATION:   chest pain  COMPARISON:  None EXAM PERFORMED/VIEWS:  XR CHEST PA & LATERAL Images: 2 FINDINGS: Cardiomediastinal silhouette appears unremarkable  The lungs are clear  No pneumothorax or pleural effusion  Osseous structures appear within normal limits for patient age  Impression: No acute cardiopulmonary disease  Workstation performed: [id number]       EKG, Pathology, and Other Studies Reviewed on Admission:   · EKG: Sinus rhythm w/intermittent PAB  No widened mediastinum    Allscripts / Epic Records Reviewed: Yes     ** Please Note: This note has been constructed using a voice recognition system   **

## 2018-03-15 NOTE — PROGRESS NOTES
[facility] Progress Note  Patient: Parker Montano 71 y o  male   MRN: [id number]  PCP: Kayleigh Perez MD  Unit/Bed#: E4 -48 Encounter: [id number]  Date Of Visit: 03/15/18    Assessment:    Principal Problem:    Chest pain  Active Problems:    HTN (hypertension)    HLD (hyperlipidemia)    Diabetes mellitus ([facility] [address] )    Methadone use ([facility] [address] )    Renal disease      Plan:    1 )  Chest pain rule out ACS  -troponins negative x3  -patient has significant risk factors including age, diabetes mellitus, hypertension, hyperlipidemia  -c/w ASA and statin  -given significant cardiac risk factors and chest pain, will proceed with a nuclear stress test  -c/w telemetry monitoring    2 ) DM  -monitor accuchecks, sliding scale for coverage  -HbA1C: 6 6    3 ) HTN  -c/w home regimen and monitor blood pressure    4 ) HLD  -c/w statin    5 ) MICH  -Creatinine improved and is normal today  -improved with IVF  -will monitor renal function  -avoid nephrotoxin    6 ) Methadone use  -secondary to history of heroin use  -confirmed methadone dose with methadone clinic  -methadone 70mg QD    7 ) COPD  -c/w symbicort  -albuterol as needed      VTE Pharmacologic Prophylaxis:   Pharmacologic: heparin    Patient Centered Rounds: I have performed bedside rounds with nursing staff today  Time Spent for Care: 20 minutes  More than 50% of total time spent on counseling and coordination of care as described above  Current Length of Stay: 0 day(s)    Current Patient Status: Observation   Certification Statement: The patient will continue to require additional inpatient hospital stay due to chest pain    Discharge Plan / Estimated Discharge Date: 24-48 hours    Code Status: Level 1 - Full Code      Subjective:   Patient seen and examined at bedside, denies any complaints      Objective:     Vitals:   Temp (24hrs), Av 6 °F (36 4 °C), Min:96 9 °F (36 1 °C), Max:98 3 °F (36 8 °C)    HR:  [58-75] 59  Resp:  [16-24] 18  BP: (132-147)/(61-74) 147/74  SpO2:  [95 %-98 %] 95 %  Body mass index is 28 18 kg/m²  Input and Output Summary (last 24 hours): Intake/Output Summary (Last 24 hours) at 03/15/18 1451  Last data filed at 03/15/18 1259   Gross per 24 hour   Intake             1080 ml   Output              500 ml   Net              580 ml       Physical Exam:    Constitutional: Patient is oriented to person, place and time, no acute distress  HEENT:  Normocephalic, atraumatic, EOMI, PERRLA, no scleral icterus, no pallor, moist oral mucosa  Neck:  Supple, no masses, no thyromegaly, no bruits Normal range of motion  Lymph nodes:  No lymphadenopathy  Cardiovascular: Normal S1S2, RRR, No murmurs/rubs/gallops appreciated  Pulmonary: Clear to auscultation bilaterally, No rhonchi/rales/wheezing appreciated  Abdominal: Soft, Bowel sounds present, Non-tender, Non-distended, No rebound/guarding, no hepatomegaly   Musculoskeletal: No tenderness/abnormality   Extremities:  No cyanosis, clubbing or edema  Peripheral pulses palpable and equal bilaterally  Neurological: Cranial nerves II-XII grossly intact, sensation intact, otherwise no focal neurological symptoms  Skin: Skin is warm and dry, no rashes  Additional Data:     Labs:      Results from last 7 days  Lab Units 03/15/18  0536   WBC Thousand/uL 8 77   HEMOGLOBIN g/dL 11 7*   HEMATOCRIT % 35 8*   PLATELETS Thousands/uL 133*   NEUTROS PCT % 56   LYMPHS PCT % 30   MONOS PCT % 8   EOS PCT % 5       Results from last 7 days  Lab Units 03/15/18  0536 03/14/18  1758   SODIUM mmol/L 139 140   POTASSIUM mmol/L 4 2 3 6   CHLORIDE mmol/L 104 101   CO2 mmol/L 27 30   BUN mg/dL 24 33*   CREATININE mg/dL 1 10 1 32*   CALCIUM mg/dL 8 7 9 1   TOTAL PROTEIN g/dL  --  7 4   BILIRUBIN TOTAL mg/dL  --  0 19*   ALK PHOS U/L  --  115   ALT U/L  --  27   AST U/L  --  19   GLUCOSE RANDOM mg/dL 91 118            I Have Reviewed All Lab Data Listed Above        Imaging:    Imaging Reports Reviewed Today Include: no new images      Recent Cultures (last 7 days):            Last 24 Hours Medication List:     Current Facility-Administered Medications:  acetaminophen 650 mg Oral Q4H PRN Sun Kan PA-C    albuterol 2 puff Inhalation Q4H PRN Sun Kan PA-C    amLODIPine 10 mg Oral Daily Sun Kan PA-C    aspirin 81 mg Oral Daily Sun Kan PA-C    atorvastatin 40 mg Oral Daily Sun Kan PA-C    budesonide-formoterol 2 puff Inhalation BID Sun Kan PA-C    cloNIDine 0 1 mg Oral BID Sun Kan PA-C    heparin (porcine) 5,000 Units Subcutaneous Q8H [facility] Sun Kan PA-C    insulin lispro 1-5 Units Subcutaneous 4x Daily (AC & HS) Sun Kan PA-C    nicotine 1 patch Transdermal Daily Sun Kan PA-C    nitroglycerin 0 4 mg Sublingual Q5 Min PRN Sun Kan PA-C    Omeprazole-Sodium Bicarbonate 1 capsule Oral QAM Sun Kan PA-C    sodium chloride 75 mL/hr Intravenous Continuous Sun Kan PA-C Last Rate: 75 mL/hr (03/15/18 1259)   tamsulosin 0 4 mg Oral Daily With Jessica Coates PA-C         Today, Patient Was Seen By: Nikia Joshi MD

## 2018-03-16 ENCOUNTER — APPOINTMENT (INPATIENT)
Dept: NUCLEAR MEDICINE | Facility: HOSPITAL | Age: 73
End: 2018-03-16
Payer: COMMERCIAL

## 2018-03-16 ENCOUNTER — APPOINTMENT (INPATIENT)
Dept: NON INVASIVE DIAGNOSTICS | Facility: HOSPITAL | Age: 73
End: 2018-03-16
Payer: COMMERCIAL

## 2018-03-16 VITALS
RESPIRATION RATE: 18 BRPM | BODY MASS INDEX: 28.21 KG/M2 | SYSTOLIC BLOOD PRESSURE: 111 MMHG | HEART RATE: 58 BPM | WEIGHT: 169.31 LBS | DIASTOLIC BLOOD PRESSURE: 63 MMHG | TEMPERATURE: 98.3 F | OXYGEN SATURATION: 95 % | HEIGHT: 65 IN

## 2018-03-16 PROBLEM — Z71.6 TOBACCO ABUSE COUNSELING: Chronic | Status: ACTIVE | Noted: 2018-03-16

## 2018-03-16 LAB
ANION GAP SERPL CALCULATED.3IONS-SCNC: 10 MMOL/L (ref 4–13)
ATRIAL RATE: 70 BPM
BASOPHILS # BLD AUTO: 0.04 THOUSANDS/ΜL (ref 0–0.1)
BASOPHILS NFR BLD AUTO: 0 % (ref 0–1)
BUN SERPL-MCNC: 25 MG/DL (ref 5–25)
CALCIUM SERPL-MCNC: 9 MG/DL (ref 8.3–10.1)
CHEST PAIN STATEMENT: NORMAL
CHLORIDE SERPL-SCNC: 102 MMOL/L (ref 100–108)
CO2 SERPL-SCNC: 24 MMOL/L (ref 21–32)
CREAT SERPL-MCNC: 1.02 MG/DL (ref 0.6–1.3)
EOSINOPHIL # BLD AUTO: 0.37 THOUSAND/ΜL (ref 0–0.61)
EOSINOPHIL NFR BLD AUTO: 4 % (ref 0–6)
ERYTHROCYTE [DISTWIDTH] IN BLOOD BY AUTOMATED COUNT: 14.6 % (ref 11.6–15.1)
GFR SERPL CREATININE-BSD FRML MDRD: 74 ML/MIN/1.73SQ M
GLUCOSE P FAST SERPL-MCNC: 103 MG/DL (ref 65–99)
GLUCOSE SERPL-MCNC: 103 MG/DL (ref 65–140)
GLUCOSE SERPL-MCNC: 186 MG/DL (ref 65–140)
GLUCOSE SERPL-MCNC: 93 MG/DL (ref 65–140)
HCT VFR BLD AUTO: 36.5 % (ref 36.5–49.3)
HGB BLD-MCNC: 11.8 G/DL (ref 12–17)
LYMPHOCYTES # BLD AUTO: 2.65 THOUSANDS/ΜL (ref 0.6–4.47)
LYMPHOCYTES NFR BLD AUTO: 26 % (ref 14–44)
MAX DIASTOLIC BP: 89 MMHG
MAX HEART RATE: 111 BPM
MAX PREDICTED HEART RATE: 149 BPM
MAX. SYSTOLIC BP: 196 MMHG
MCH RBC QN AUTO: 29.8 PG (ref 26.8–34.3)
MCHC RBC AUTO-ENTMCNC: 32.3 G/DL (ref 31.4–37.4)
MCV RBC AUTO: 92 FL (ref 82–98)
MONOCYTES # BLD AUTO: 0.67 THOUSAND/ΜL (ref 0.17–1.22)
MONOCYTES NFR BLD AUTO: 7 % (ref 4–12)
NEUTROPHILS # BLD AUTO: 6.47 THOUSANDS/ΜL (ref 1.85–7.62)
NEUTS SEG NFR BLD AUTO: 63 % (ref 43–75)
NRBC BLD AUTO-RTO: 0 /100 WBCS
P AXIS: 79 DEGREES
PLATELET # BLD AUTO: 139 THOUSANDS/UL (ref 149–390)
PMV BLD AUTO: 13.4 FL (ref 8.9–12.7)
POTASSIUM SERPL-SCNC: 4.2 MMOL/L (ref 3.5–5.3)
PR INTERVAL: 174 MS
PROTOCOL NAME: NORMAL
QRS AXIS: 72 DEGREES
QRSD INTERVAL: 94 MS
QT INTERVAL: 390 MS
QTC INTERVAL: 421 MS
RBC # BLD AUTO: 3.96 MILLION/UL (ref 3.88–5.62)
SODIUM SERPL-SCNC: 136 MMOL/L (ref 136–145)
T WAVE AXIS: 60 DEGREES
TARGET HR FORMULA: NORMAL
TEST INDICATION: NORMAL
TIME IN EXERCISE PHASE: NORMAL
VENTRICULAR RATE: 70 BPM
WBC # BLD AUTO: 10.2 THOUSAND/UL (ref 4.31–10.16)

## 2018-03-16 PROCEDURE — 82948 REAGENT STRIP/BLOOD GLUCOSE: CPT

## 2018-03-16 PROCEDURE — A9502 TC99M TETROFOSMIN: HCPCS

## 2018-03-16 PROCEDURE — 99217 PR OBSERVATION CARE DISCHARGE MANAGEMENT: CPT | Performed by: INTERNAL MEDICINE

## 2018-03-16 PROCEDURE — 85025 COMPLETE CBC W/AUTO DIFF WBC: CPT | Performed by: INTERNAL MEDICINE

## 2018-03-16 PROCEDURE — 93017 CV STRESS TEST TRACING ONLY: CPT

## 2018-03-16 PROCEDURE — 78452 HT MUSCLE IMAGE SPECT MULT: CPT

## 2018-03-16 PROCEDURE — 80048 BASIC METABOLIC PNL TOTAL CA: CPT | Performed by: INTERNAL MEDICINE

## 2018-03-16 RX ORDER — NICOTINE 21 MG/24HR
1 PATCH, TRANSDERMAL 24 HOURS TRANSDERMAL EVERY 24 HOURS
Qty: 28 PATCH | Refills: 0 | Status: SHIPPED | OUTPATIENT
Start: 2018-03-16 | End: 2018-03-16

## 2018-03-16 RX ORDER — NICOTINE 21 MG/24HR
1 PATCH, TRANSDERMAL 24 HOURS TRANSDERMAL EVERY 24 HOURS
Qty: 28 PATCH | Refills: 0 | Status: SHIPPED | OUTPATIENT
Start: 2018-03-16 | End: 2018-06-21

## 2018-03-16 RX ADMIN — BUDESONIDE AND FORMOTEROL FUMARATE DIHYDRATE 2 PUFF: 160; 4.5 AEROSOL RESPIRATORY (INHALATION) at 08:35

## 2018-03-16 RX ADMIN — HEPARIN SODIUM 5000 UNITS: 5000 INJECTION, SOLUTION INTRAVENOUS; SUBCUTANEOUS at 05:39

## 2018-03-16 RX ADMIN — HYDROCHLOROTHIAZIDE: 25 TABLET ORAL at 08:38

## 2018-03-16 RX ADMIN — AMLODIPINE BESYLATE 10 MG: 10 TABLET ORAL at 08:38

## 2018-03-16 RX ADMIN — CLONIDINE HYDROCHLORIDE 0.1 MG: 0.1 TABLET ORAL at 08:38

## 2018-03-16 RX ADMIN — ASPIRIN 81 MG: 81 TABLET, COATED ORAL at 08:39

## 2018-03-16 RX ADMIN — Medication 1 CAPSULE: at 08:35

## 2018-03-16 RX ADMIN — NICOTINE 1 PATCH: 14 PATCH TRANSDERMAL at 08:40

## 2018-03-16 RX ADMIN — METHADONE HYDROCHLORIDE 70 MG: 10 CONCENTRATE ORAL at 08:36

## 2018-03-16 RX ADMIN — HEPARIN SODIUM 5000 UNITS: 5000 INJECTION, SOLUTION INTRAVENOUS; SUBCUTANEOUS at 13:57

## 2018-03-16 NOTE — CASE MANAGEMENT
Continued Stay Review    Date:  3/16/2018    Vital Signs: /63 (BP Location: Left arm)   Pulse 58   Temp 98 3 °F (36 8 °C) (Tympanic)   Resp 18   Ht 5' 5" (1 651 m)   Wt 76 8 kg (169 lb 5 oz)   SpO2 95%   BMI 28 18 kg/m²     Medications:   Scheduled Meds:   Current Facility-Administered Medications:  acetaminophen 650 mg Oral Q4H PRN Sun Kan PA-C   albuterol 2 puff Inhalation Q4H PRN Sun Kan PA-C   amLODIPine 10 mg Oral Daily Sun Kan PA-C   aspirin 81 mg Oral Daily Sun Kan PA-C   atorvastatin 40 mg Oral Daily Sun Kan PA-C   budesonide-formoterol 2 puff Inhalation BID Sun Kan PA-C   cloNIDine 0 1 mg Oral BID Sun Kan PA-C   heparin (porcine) 5,000 Units Subcutaneous [facility] Sun Kan PA-C   lisinopril-hydrochlorothiazide (PRINZIDE 20/25) combo dose  Oral Daily Jose Manuel Horan MD   methadone 70 mg Oral Daily Jose Manuel Horan MD   nicotine 1 patch Transdermal Daily Sun Kan PA-C   nitroglycerin 0 4 mg Sublingual Q5 Min PRN Sun Kan PA-C   Omeprazole-Sodium Bicarbonate 1 capsule Oral QAM Sun Kan PA-C   tamsulosin 0 4 mg Oral Daily With [employer] STUART Kan PA-C     Continuous Infusions:    PRN Meds:   acetaminophen    albuterol    nitroglycerin    Abnormal Labs/Diagnostic Results:   WBC Thousand/uL 10 20*   HEMOGLOBIN g/dL 11 8*   HEMATOCRIT % 36 5   PLATELETS Thousands/uL 139*         Age/Sex: 71 y o  male   Feels better denies chest pain shortness of breath nausea vomiting diarrhea no fevers or chills appetite good    Assessment/Plan:   Chest pain                               rule out acute coronary syndrome serial troponins less than 0 2, will have a nuclear stress test done today     Diabetes                                  diet control     Hypertension                           well controlled with current medication     Dyslipidemia                            continue statin for LDL control     A KI                                         now resolved baseline 1 02     Tobacco abuse                       patch provided recommend discontinuing forever     Methadone maintenance        continue methadone as scheduled outpatient       Discharge Plan:  Discharged to home 3/14/2018

## 2018-03-16 NOTE — PROGRESS NOTES
Progress Note - William Blackman 71 y o  male MRN: [id number]    Unit/Bed#: E4 -01 Encounter: [id number]    Assessment/Plan:    Chest pain   rule out acute coronary syndrome serial troponins less than 0 2, will have a nuclear stress test done today    Diabetes   diet control    Hypertension   well controlled with current medication    Dyslipidemia   continue statin for LDL control    A KI    now resolved baseline 1 02    Tobacco abuse  patch provided recommend discontinuing forever    Methadone maintenance continue methadone as scheduled outpatient    Subjective:   Feels better denies chest pain shortness of breath nausea vomiting diarrhea no fevers or chills appetite good    Objective:     Vitals: Blood pressure 117/64, pulse 60, temperature 97 6 °F (36 4 °C), temperature source Tympanic, resp  rate 18, height 5' 5" (1 651 m), weight 76 8 kg (169 lb 5 oz), SpO2 96 %  ,Body mass index is 28 18 kg/m²  Results from last 7 days  Lab Units 03/16/18  0443   WBC Thousand/uL 10 20*   HEMOGLOBIN g/dL 11 8*   HEMATOCRIT % 36 5   PLATELETS Thousands/uL 139*       Results from last 7 days  Lab Units 03/16/18  0443  03/14/18  1758   SODIUM mmol/L 136  < > 140   POTASSIUM mmol/L 4 2  < > 3 6   CHLORIDE mmol/L 102  < > 101   CO2 mmol/L 24  < > 30   BUN mg/dL 25  < > 33*   CREATININE mg/dL 1 02  < > 1 32*   CALCIUM mg/dL 9 0  < > 9 1   TOTAL PROTEIN g/dL  --   --  7 4   BILIRUBIN TOTAL mg/dL  --   --  0 19*   ALK PHOS U/L  --   --  115   ALT U/L  --   --  27   AST U/L  --   --  19   GLUCOSE RANDOM mg/dL 103  < > 118   < > = values in this interval not displayed      Scheduled Meds:    Current Facility-Administered Medications:  acetaminophen 650 mg Oral Q4H PRN Sun Kan PA-C   albuterol 2 puff Inhalation Q4H PRN Sun Kan PA-C   amLODIPine 10 mg Oral Daily Sun Kan PA-C   aspirin 81 mg Oral Daily Sun Kan PA-C   atorvastatin 40 mg Oral Daily Sun Hernandez PA-C budesonide-formoterol 2 puff Inhalation BID Sun Kan PA-C   cloNIDine 0 1 mg Oral BID Sun Kan PA-C   heparin (porcine) 5,000 Units Subcutaneous Q8H [address] Sun Kan PA-C   insulin lispro 1-5 Units Subcutaneous 4x Daily (AC & HS) Sun Kan PA-C   lisinopril-hydrochlorothiazide (PRINZIDE 20/25) combo dose  Oral Daily Andrea Yates MD   methadone 70 mg Oral Daily Andrea Yates MD   nicotine 1 patch Transdermal Daily Sun Kan PA-C   nitroglycerin 0 4 mg Sublingual Q5 Min PRN Sun Kan PA-C   Omeprazole-Sodium Bicarbonate 1 capsule Oral QAM Sun Kan PA-C   tamsulosin 0 4 mg Oral Daily With [employer] Kan PA-C       Continuous Infusions:     Physical exam:  General appearance:  Alert no distress interaction appropriate   Head/Eyes:  Nonicteric  Neck:  Supple  Lungs: CTA bilateral no wheezing rhonchi or rales  Heart: normal S1 S2 regular  Abdomen: Soft nontender with bowel sounds  Extremities: no edema  Skin: no rash    Invasive Devices     Peripheral Intravenous Line            Peripheral IV 03/14/18 Left Antecubital 1 day                  VTE Pharmacologic Prophylaxis:  heparin   VTE Mechanical Prophylaxis:  SCDs                     Counseling / Coordination of Care  Total floor / unit time spent today  30   minutes  Greater than 50% of total time was spent with the patient and / or family counseling and / or coordination of care    A description of the counseling / coordination of care:

## 2018-03-16 NOTE — DISCHARGE SUMMARY
Discharge Summary - Medical Marly Falcon 71 y o  male MRN: [id number]    [address] [place] [address] MED SURG Room / Bed: [id number] [facility] Encounter: [id number]    BRIEF OVERVIEW    Admitting Provider: Nidia Chávez DO  Discharge Provider: Nidia Chávez DO  Admission Date: 3/14/2018     Discharge Date: No discharge date for patient encounter  Primary Care Physician at Discharge: Mariah Lennon [phone]    Primary Discharge Diagnosis  Chest pain    Other Problems Addressed  Patient Active Problem List    Diagnosis Date Noted    Tobacco abuse counseling 03/16/2018    Chest pain 03/14/2018    HTN (hypertension) 03/14/2018    HLD (hyperlipidemia) 03/14/2018    Diabetes mellitus ([facility] [address] ) 03/14/2018    Methadone use ([facility] [address] ) 03/14/2018    Renal disease 03/14/2018       Therapeutic Operative Procedures Performed  Exercise stress test    Discharge Disposition: home    Allergies  No Known Allergies  Diet restrictions:  low-salt diabetic diet   Activity restrictions:  none   Discharge Condition:  Ledy Krueger in one week   Follow up with consulting providers  Dr Colonel Cheung cardiology make appointment as soon as possible       [address] [place] [address]    Presenting Problem/History of Present Illness  Chest pain  Hospital Course  71year-old presented with chest pain    Chest pain  patient was admitted to rule out acute coronary syndrome with serial troponins  Troponins were less than 0 2 x 3 with cardiac risk factors he underwent a exercise stress test, however because of shortness of breath this was terminated early and nondiagnostic  Therefore discharged to follow up with Cardiology for repeating test outpatient    Most important the patient is to stop smoking and continue with low-salt diabetic diet    Tobacco abuse  patient was provided patch cessation counseling was provided he will attempt to quit smoking forever using a patch recognizing that the risk for continuing smoking lead to cardiac ischemia and death  Discharge  Statement   I spent 25 minutes discharging the patient  This time was spent on the day of discharge  I had direct contact with the patient on the day of discharge  Additional documentation is required if more than 30 minutes were spent on discharge  Discussed with Cardiology and stressed nonsmoking with patient and Cardiology follow-up importance    Discharge instructions/Information to patient and family:   See after visit summary for information provided to patient and family

## 2018-03-28 NOTE — TREATMENT PLAN
Patient received Methadone 70mg Once Daily for 3/15/18 and 3/16/18 during his hospital stay  If any further questions or assistance, please feel free to contact us  Thank You      Sincerely,        STUART Domínguez

## 2018-04-18 ENCOUNTER — APPOINTMENT (EMERGENCY)
Dept: RADIOLOGY | Facility: HOSPITAL | Age: 73
End: 2018-04-18
Payer: COMMERCIAL

## 2018-04-18 ENCOUNTER — HOSPITAL ENCOUNTER (EMERGENCY)
Facility: HOSPITAL | Age: 73
Discharge: HOME | End: 2018-04-18
Attending: EMERGENCY MEDICINE
Payer: COMMERCIAL

## 2018-04-18 VITALS
HEIGHT: 65 IN | RESPIRATION RATE: 18 BRPM | DIASTOLIC BLOOD PRESSURE: 71 MMHG | HEART RATE: 64 BPM | BODY MASS INDEX: 27.49 KG/M2 | WEIGHT: 165 LBS | OXYGEN SATURATION: 95 % | TEMPERATURE: 98.1 F | SYSTOLIC BLOOD PRESSURE: 160 MMHG

## 2018-04-18 DIAGNOSIS — S20.211A BRUISED RIBS, RIGHT, INITIAL ENCOUNTER: Primary | ICD-10-CM

## 2018-04-18 DIAGNOSIS — Z91.81 HX OF FALL: ICD-10-CM

## 2018-04-18 LAB
ATRIAL RATE: 67 BPM
P AXIS: 64 DEGREES
PR INTERVAL: 164 MS
QRS AXIS: 59 DEGREES
QRSD INTERVAL: 92 MS
QT INTERVAL: 388 MS
QTC INTERVAL: 409 MS
T WAVE AXIS: 54 DEGREES
VENTRICULAR RATE: 67 BPM

## 2018-04-18 PROCEDURE — 99283 EMERGENCY DEPT VISIT LOW MDM: CPT

## 2018-04-18 PROCEDURE — 93005 ELECTROCARDIOGRAM TRACING: CPT

## 2018-04-18 PROCEDURE — 93010 ELECTROCARDIOGRAM REPORT: CPT | Performed by: INTERNAL MEDICINE

## 2018-04-18 PROCEDURE — 71101 X-RAY EXAM UNILAT RIBS/CHEST: CPT

## 2018-04-18 RX ORDER — METHOCARBAMOL 500 MG/1
500 TABLET, FILM COATED ORAL ONCE
Status: COMPLETED | OUTPATIENT
Start: 2018-04-18 | End: 2018-04-18

## 2018-04-18 RX ORDER — LIDOCAINE 50 MG/G
1 PATCH TOPICAL ONCE
Status: DISCONTINUED | OUTPATIENT
Start: 2018-04-18 | End: 2018-04-18 | Stop reason: HOSPADM

## 2018-04-18 RX ORDER — LIDOCAINE 50 MG/G
1 PATCH TOPICAL EVERY 24 HOURS
Qty: 7 PATCH | Refills: 0 | Status: SHIPPED | OUTPATIENT
Start: 2018-04-18 | End: 2018-06-21

## 2018-04-18 RX ORDER — ACETAMINOPHEN 325 MG/1
650 TABLET ORAL ONCE
Status: COMPLETED | OUTPATIENT
Start: 2018-04-18 | End: 2018-04-18

## 2018-04-18 RX ADMIN — LIDOCAINE 1 PATCH: 50 PATCH TOPICAL at 13:58

## 2018-04-18 RX ADMIN — ACETAMINOPHEN 650 MG: 325 TABLET, FILM COATED ORAL at 13:58

## 2018-04-18 RX ADMIN — METHOCARBAMOL 500 MG: 500 TABLET ORAL at 13:58

## 2018-04-18 NOTE — ED PROVIDER NOTES
History  Chief Complaint   Patient presents with    Fall     pt c/o recent fall while playing with [employer], now with right sided chest/rib pain  80-year-old male presents for evaluation of 3 days of sharp severe right lateral chest wall pain  He states the pain started suddenly after he had a mechanical fall playing with his grandkids  He states he fell striking the right side of his body  Denies striking his head, loss of consciousness  States pain is constant, nonradiating, worse with movement/inspiration  He denies headache, loss of consciousness, other traumatic injuries, nausea, vomiting, fevers, chills, shortness of breath, dizziness, difficulty with ambulation  History provided by:  Patient  Fall   Associated symptoms: chest pain    Associated symptoms: no abdominal pain, no nausea and no vomiting        Prior to Admission Medications   Prescriptions Last Dose Informant Patient Reported? Taking?    amLODIPine (NORVASC) 10 mg tablet   Yes Yes   Sig: Take 10 mg by mouth daily   aspirin (ECOTRIN LOW STRENGTH) 81 mg EC tablet   Yes Yes   Sig: Take 81 mg by mouth daily   atorvastatin (LIPITOR) 40 mg tablet   Yes Yes   Sig: Take 40 mg by mouth daily   cloNIDine (CATAPRES) 0 2 mg tablet   No Yes   Sig: Take 0 5 tablets by mouth 2 (two) times a day   fenofibrate (TRICOR) 145 mg tablet   Yes Yes   Sig: Take 145 mg by mouth daily   lisinopril-hydrochlorothiazide (PRINZIDE,ZESTORETIC) 20-25 MG per tablet   Yes Yes   Sig: Take 1 tablet by mouth daily   nicotine (NICODERM CQ) 21 mg/24 hr TD 24 hr patch   No Yes   Sig: Place 1 patch on the skin every 24 hours   omeprazole-sodium bicarbonate (ZEGERID)  MG per capsule   Yes Yes   Sig: Take 1 capsule by mouth every morning before breakfast   sitaGLIPtin (JANUVIA) 25 mg tablet   Yes Yes   Sig: Take 25 mg by mouth daily   tamsulosin (FLOMAX) 0 4 mg   Yes Yes   Sig: Take 0 4 mg by mouth daily with dinner   tiZANidine (ZANAFLEX) 4 mg tablet   Yes Yes Sig: Take 4 mg by mouth daily at bedtime      Facility-Administered Medications: None       Past Medical History:   Diagnosis Date    [facility] mellitus [facility] [address] )     Heroin abuse     "20 years" snorting heroin; last use 1 week ago   Hyperlipidemia     Hypertension        History reviewed  No pertinent surgical history  History reviewed  No pertinent family history  I have reviewed and agree with the history as documented  Social History   Substance Use Topics    Smoking status: Current Some Day Smoker     Packs/day: 0 25    Smokeless tobacco: Never Used    Alcohol use No        Review of Systems   Constitutional: Negative for activity change, appetite change, fatigue and fever  HENT: Negative for congestion, dental problem, ear pain, rhinorrhea and sore throat  Eyes: Negative for pain and redness  Respiratory: Negative for chest tightness, shortness of breath and wheezing  Cardiovascular: Positive for chest pain  Negative for palpitations  Gastrointestinal: Negative for abdominal pain, blood in stool, constipation, diarrhea, nausea and vomiting  Endocrine: Negative for cold intolerance and heat intolerance  Genitourinary: Negative for dysuria, frequency and hematuria  Musculoskeletal: Negative for arthralgias and myalgias  Skin: Negative for color change, pallor and rash  Neurological: Negative for weakness and numbness  Hematological: Does not bruise/bleed easily  Psychiatric/Behavioral: Negative for agitation, hallucinations and suicidal ideas         Physical Exam  ED Triage Vitals [04/18/18 1233]   Temperature Pulse Respirations Blood Pressure SpO2   98 1 °F (36 7 °C) 70 22 (!) 192/84 95 %      Temp Source Heart Rate Source Patient Position - Orthostatic VS BP Location FiO2 (%)   Oral Monitor Lying Right arm --      Pain Score       Worst Possible Pain           Orthostatic Vital Signs  Vitals:    04/18/18 1233 04/18/18 1322   BP: (!) 192/84 163/72   Pulse: 70 65   Patient Position - Orthostatic VS: Lying        Physical Exam   Constitutional: He appears well-developed and well-nourished  HENT:   Normocephalic/atraumatic  There is no facial bone tenderness palpation  No facial bone tenderness  No ghosh sign, no raccoon eyes, no hemotympanum  Nose is atraumatic  No evidence of epistaxis  Eyes:   Patient has painless full range of motion in both her eyes  Normal visual fields  No hyphema noted  Neck: No tracheal deviation present  Patient is nontender palpation over her cervical, thoracic, lumbar spines  There is no step-offs, no deformities  Cardiovascular:   No JVD noted  Heart sounds are normal  Regular rate rate and rhythm  Symmetric strong distal pulses  Pulmonary/Chest:   Chest wall is stable and R sided tender palpation  There is no crepitus noted  Patient has symmetric chest wall expansion  No flail segment noted clear and equal breath sounds bilateral    Abdominal:   No external signs of trauma noted on the abdomen/pelvis  Patient is nontender palpation of the abdomen  There is no rebound, guarding, CVA tenderness  Abdomen is nondistended  Pelvis stable, nttp  Musculoskeletal:   Right upper extremity has full range of motion without pain  There is no tenderness palpation noted  Patient has no external signs of trauma  Patient is neurovascularly intact in this extremity  Left upper extremity has full range of motion without pain  There is no tenderness palpation noted  Patient has no external signs of trauma  Patient is neurovascularly intact in this extremity  Right lower extremity has full range of motion without pain  There is no tenderness palpation noted  Patient has no external signs of trauma  Patient is neurovascularly intact in this extremity  Left Lower  extremity has full range of motion without pain  There is no tenderness palpation noted  Patient has no external signs of trauma  Patient is neurovascularly intact in this extremity     Neurological: Alert and oriented ×3  Normal mental status exam  Normal cranial nerves II through XII  Normal sensation and strength throughout  Normal cerebellar exam  GCS 15  Skin:   No external signs of trauma  Psychiatric: He has a normal mood and affect  His behavior is normal  Judgment normal    Nursing note and vitals reviewed  ED Medications  Medications   lidocaine (LIDODERM) 5 % patch 1 patch (1 patch Transdermal Medication Applied 4/18/18 1358)   methocarbamol (ROBAXIN) tablet 500 mg (500 mg Oral Given 4/18/18 1358)   acetaminophen (TYLENOL) tablet 650 mg (650 mg Oral Given 4/18/18 1358)       Diagnostic Studies  Results Reviewed     None                 XR ribs with pa chest min 3 views RIGHT   ED Interpretation by Pancho Valdez MD (04/18 1435)   Primary reviewed: no acute abnormality                 Procedures  Procedures       Phone Contacts  ED Phone Contact    ED Course  ED Course as of Apr 18 1437 Wed Apr 18, 2018   1434 Chest x-ray reviewed  Negative for fracture  Patient be treated for clinical rib fractures, M*Modal pain therapy, primary care physician follow-up                                MDM  Number of Diagnoses or Management Options  Diagnosis management comments: Traumatic chest pain-EKG was done by 1st nurse protocol was not indicated in this patient  Will do rib series to rule out acute traumatic pathology/pneumonia, treat pain, reassess    CritCare Time    Disposition  Final diagnoses:   Bruised ribs, right, initial encounter   Hx of fall     Time reflects when diagnosis was documented in both MDM as applicable and the Disposition within this note     Time User Action Codes Description Comment    4/18/2018  2:36 PM Rachna Meyers Bruised ribs, right, initial encounter     4/18/2018  2:36 PM Emily Valenzuela Add [Z91 [id number]] Hx of fall       ED Disposition     ED Disposition Condition Comment    Discharge  Karyle Huerta discharge to home/self care      Condition at discharge: Good        Follow-up Information     Follow up With Specialties Details Why Contact Info    J  Manual MD Pablo  Schedule an appointment as soon as possible for a visit in 2 days  [address]  [address]  [phone]          Patient's Medications   Discharge Prescriptions    LIDOCAINE (LIDODERM) 5 %    Place 1 patch on the skin every 24 hours for 7 doses Remove & Discard patch within 12 hours or as directed by MD       Start Date: 4/18/2018 End Date: 4/25/2018       Order Dose: 1 patch       Quantity: 7 patch    Refills: 0     No discharge procedures on file      ED Provider  Electronically Signed by           Shane Kirby MD  04/18/18 9901

## 2018-04-18 NOTE — DISCHARGE INSTRUCTIONS
Contusión de Donte   LO QUE NECESITA SABER:   [place] contusión de [place] es un moretón en talon o más de ashley [place]  INSTRUCCIONES SOBRE EL OFE HOSPITALARIA:   Regrese a la calvin de emergencias si:   · Usted tiene un aumento en el dolor de [place]  · Le falta el aire  · Waleska Angy a expectorar julio  · Farrell dolor no mejora con medicamento para dolor  Pregúntele a farrell Tony Forester vitaminas y minerales son adecuados para usted  · Usted tiene tos  · Usted tiene fiebre  · Usted tiene preguntas o inquietudes acerca de farrell condición o cuidado  Medicamentos:  Es posible que usted necesite alguno de los siguientes:  · AINEs (Analgésicos antiinflamatorios no esteroides) debby el ibuprofeno, ayudan a disminuir la inflamación, el dolor y la fiebre  [place] medicamento esta disponible con o sin talon receta médica  Los AINEs pueden causar sangrado estomacal o problemas renales en ciertas personas  Si usted esta tomando un anticoágulante,  siempre  pregunte si los AINEs son seguros para usted  Siempre edwar la etiqueta de nieves medicamento y [place] instrucciones  No administre nieves medicamento a niños menores de 6 meses de nick sin antes obtener la autorización de farrell médico      · Un medicamento con receta para el dolor  podrían ser Gerhardt Janae  Pregunte cómo zenobia estos medicamentos de talon forma addison  · [place] ashley medicamentos debby se le haya indicado  Consulte con farrell médico si usted rosio que farrell medicamento no le está ayudando o si presenta efectos secundarios  Infórmele si es alérgico a algún medicamento  Mantenga talon lista actualizada de los [place], las vitaminas y los productos herbales que giovanni  Incluya los siguientes datos de los medicamentos: cantidad, frecuencia y motivo de administración  Traiga con usted la lista o los envases de la píldoras a ashley citas de seguimiento  Lleve la lista de los medicamentos con usted en hector de talon emergencia    Respiración profunda:   · Para ayudar a evitar la neumonía, respire profundo 10 veces cada hora, aún cuando se despierte en la noche  Sujete ashley costillas con ashley pauly o con talon almohada mientras giovanni las respiraciones profundas o tose  [place] ayudará a disminuir el dolor  · Es posible que usted necesite un espirómetro incentivo para ayudarlo a zenobia respiraciones mas profundas  Coloque la pieza plástica en la boca y tome un respiro muy profundo  Sostenga el aire lo dara que usted pueda  Luis Ellery  Reilly esto 10 veces seguidas cada hora mientras esté despierto  Descanse:  Descanse las costillas para disminuir la inflamación y permitir que la lesión sane mas rápido  Evite las [employer] podrían provocar más dolor o dañar ashley costillas  Conforme cade dolor disminuya, comience a realizar movimientos lentamente  Hielo:  El hielo ayuda a disminuir la inflamación y el dolor  El hielo también puede contribuir a evitar el daño de los tejidos  Use talon bolsa con hielo o ponga hielo triturado en talon bolsa de plástico  Ariana Cleaves la bolsa con talon toalla y aplíquela sobre el área fracturada por 15 a 20 minutos cada hora debby se le indicó  Acuda a ashley consultas de control con cade médico según le indicaron  Anote ashley preguntas para que se acuerde de hacerlas jesusita ashley visitas  © 2017 [address] Information is for End User's use only and may not be sold, redistributed or otherwise used for commercial purposes  All illustrations and images included in CareNotes® are the copyrighted property of A D A M , Inc  or [address]  Esta información es sólo para uso en educación  Cade intención no es darle un consejo médico sobre enfermedades o tratamientos  Colsulte con cade Melven Rimes farmacéutico antes de seguir cualquier régimen médico para saber si es seguro y efectivo para usted

## 2018-04-19 ENCOUNTER — APPOINTMENT (EMERGENCY)
Dept: CT IMAGING | Facility: HOSPITAL | Age: 73
End: 2018-04-19
Payer: COMMERCIAL

## 2018-04-19 ENCOUNTER — HOSPITAL ENCOUNTER (EMERGENCY)
Facility: HOSPITAL | Age: 73
Discharge: HOME | End: 2018-04-19
Attending: EMERGENCY MEDICINE | Admitting: EMERGENCY MEDICINE
Payer: COMMERCIAL

## 2018-04-19 VITALS
SYSTOLIC BLOOD PRESSURE: 147 MMHG | RESPIRATION RATE: 18 BRPM | WEIGHT: 174 LBS | HEART RATE: 57 BPM | DIASTOLIC BLOOD PRESSURE: 70 MMHG | OXYGEN SATURATION: 100 % | TEMPERATURE: 97.6 F | BODY MASS INDEX: 28.96 KG/M2

## 2018-04-19 DIAGNOSIS — J40 BRONCHITIS: ICD-10-CM

## 2018-04-19 DIAGNOSIS — R93.89 ABNORMAL FINDING ON CT SCAN: ICD-10-CM

## 2018-04-19 DIAGNOSIS — R07.9 RIGHT-SIDED CHEST PAIN: Primary | ICD-10-CM

## 2018-04-19 LAB
ANION GAP SERPL CALCULATED.3IONS-SCNC: 7 MMOL/L (ref 4–13)
APTT PPP: 30 SECONDS (ref 23–35)
ATRIAL RATE: 64 BPM
BASOPHILS # BLD AUTO: 0.02 THOUSANDS/ΜL (ref 0–0.1)
BASOPHILS NFR BLD AUTO: 0 % (ref 0–1)
BUN SERPL-MCNC: 21 MG/DL (ref 5–25)
CALCIUM SERPL-MCNC: 9 MG/DL (ref 8.3–10.1)
CHLORIDE SERPL-SCNC: 106 MMOL/L (ref 100–108)
CO2 SERPL-SCNC: 31 MMOL/L (ref 21–32)
CREAT SERPL-MCNC: 1.16 MG/DL (ref 0.6–1.3)
EOSINOPHIL # BLD AUTO: 0.38 THOUSAND/ΜL (ref 0–0.61)
EOSINOPHIL NFR BLD AUTO: 5 % (ref 0–6)
ERYTHROCYTE [DISTWIDTH] IN BLOOD BY AUTOMATED COUNT: 14.7 % (ref 11.6–15.1)
GFR SERPL CREATININE-BSD FRML MDRD: 63 ML/MIN/1.73SQ M
GLUCOSE SERPL-MCNC: 137 MG/DL (ref 65–140)
HCT VFR BLD AUTO: 39.6 % (ref 36.5–49.3)
HGB BLD-MCNC: 12.7 G/DL (ref 12–17)
INR PPP: 0.93 (ref 0.86–1.16)
LYMPHOCYTES # BLD AUTO: 2.2 THOUSANDS/ΜL (ref 0.6–4.47)
LYMPHOCYTES NFR BLD AUTO: 28 % (ref 14–44)
MCH RBC QN AUTO: 29.9 PG (ref 26.8–34.3)
MCHC RBC AUTO-ENTMCNC: 32.1 G/DL (ref 31.4–37.4)
MCV RBC AUTO: 93 FL (ref 82–98)
MONOCYTES # BLD AUTO: 0.66 THOUSAND/ΜL (ref 0.17–1.22)
MONOCYTES NFR BLD AUTO: 8 % (ref 4–12)
NEUTROPHILS # BLD AUTO: 4.56 THOUSANDS/ΜL (ref 1.85–7.62)
NEUTS SEG NFR BLD AUTO: 59 % (ref 43–75)
NRBC BLD AUTO-RTO: 0 /100 WBCS
P AXIS: 97 DEGREES
PLATELET # BLD AUTO: 154 THOUSANDS/UL (ref 149–390)
PMV BLD AUTO: 12.9 FL (ref 8.9–12.7)
POTASSIUM SERPL-SCNC: 4.5 MMOL/L (ref 3.5–5.3)
PR INTERVAL: 164 MS
PROTHROMBIN TIME: 12.5 SECONDS (ref 12.1–14.4)
QRS AXIS: 67 DEGREES
QRSD INTERVAL: 92 MS
QT INTERVAL: 398 MS
QTC INTERVAL: 410 MS
RBC # BLD AUTO: 4.25 MILLION/UL (ref 3.88–5.62)
SODIUM SERPL-SCNC: 144 MMOL/L (ref 136–145)
T WAVE AXIS: 74 DEGREES
TROPONIN I SERPL-MCNC: <0.02 NG/ML
VENTRICULAR RATE: 64 BPM
WBC # BLD AUTO: 7.82 THOUSAND/UL (ref 4.31–10.16)

## 2018-04-19 PROCEDURE — 80048 BASIC METABOLIC PNL TOTAL CA: CPT | Performed by: EMERGENCY MEDICINE

## 2018-04-19 PROCEDURE — 85610 PROTHROMBIN TIME: CPT | Performed by: EMERGENCY MEDICINE

## 2018-04-19 PROCEDURE — 85025 COMPLETE CBC W/AUTO DIFF WBC: CPT | Performed by: EMERGENCY MEDICINE

## 2018-04-19 PROCEDURE — 99284 EMERGENCY DEPT VISIT MOD MDM: CPT

## 2018-04-19 PROCEDURE — 71275 CT ANGIOGRAPHY CHEST: CPT

## 2018-04-19 PROCEDURE — 36415 COLL VENOUS BLD VENIPUNCTURE: CPT | Performed by: EMERGENCY MEDICINE

## 2018-04-19 PROCEDURE — 93005 ELECTROCARDIOGRAM TRACING: CPT

## 2018-04-19 PROCEDURE — 93010 ELECTROCARDIOGRAM REPORT: CPT | Performed by: INTERNAL MEDICINE

## 2018-04-19 PROCEDURE — 84484 ASSAY OF TROPONIN QUANT: CPT | Performed by: EMERGENCY MEDICINE

## 2018-04-19 PROCEDURE — 85730 THROMBOPLASTIN TIME PARTIAL: CPT | Performed by: EMERGENCY MEDICINE

## 2018-04-19 RX ORDER — NAPROXEN 500 MG/1
500 TABLET ORAL 2 TIMES DAILY PRN
Qty: 14 TABLET | Refills: 0 | Status: SHIPPED | OUTPATIENT
Start: 2018-04-19 | End: 2018-04-19

## 2018-04-19 RX ORDER — AZITHROMYCIN 250 MG/1
500 TABLET, FILM COATED ORAL ONCE
Status: COMPLETED | OUTPATIENT
Start: 2018-04-19 | End: 2018-04-19

## 2018-04-19 RX ORDER — AZITHROMYCIN 250 MG/1
250 TABLET, FILM COATED ORAL DAILY
Qty: 4 TABLET | Refills: 0 | Status: SHIPPED | OUTPATIENT
Start: 2018-04-19 | End: 2018-04-23

## 2018-04-19 RX ORDER — NAPROXEN 500 MG/1
500 TABLET ORAL 2 TIMES DAILY PRN
Qty: 14 TABLET | Refills: 0 | Status: SHIPPED | OUTPATIENT
Start: 2018-04-19 | End: 2018-06-21

## 2018-04-19 RX ORDER — AZITHROMYCIN 250 MG/1
250 TABLET, FILM COATED ORAL DAILY
Qty: 4 TABLET | Refills: 0 | Status: SHIPPED | OUTPATIENT
Start: 2018-04-19 | End: 2018-04-19

## 2018-04-19 RX ORDER — NAPROXEN 250 MG/1
500 TABLET ORAL ONCE
Status: COMPLETED | OUTPATIENT
Start: 2018-04-19 | End: 2018-04-19

## 2018-04-19 RX ADMIN — NAPROXEN 500 MG: 250 TABLET ORAL at 17:56

## 2018-04-19 RX ADMIN — AZITHROMYCIN 500 MG: 250 TABLET, FILM COATED ORAL at 17:56

## 2018-04-19 RX ADMIN — IOHEXOL 85 ML: 350 INJECTION, SOLUTION INTRAVENOUS at 16:44

## 2018-04-19 NOTE — ED PROVIDER NOTES
History  Chief Complaint   Patient presents with    Cough     Pt reports seen [facility] yesterday, had CXR "but they found nothing, but that's insidde", reports continued "lung" pain "inside" with coughing, states was given a patch, but it is not helping because the pain is "inside pain"     HPI   78-year-old male presents to the emergency department for evaluation of right-sided chest pain  Patient states that fell onto his right side 4 days ago while playing with his grandchildren  While he did not have pain initially, he began to notice right-sided chest pain yesterday  Pain has been sharp, stabbing, constant, and occasionally radiating to his right upper back  He states that pain feels like it is inside his lung, not on the outside of his chest   It has been exacerbated by movement, deep inspiration, and coughing and has not improved despite use of lidoderm patch  Patient recalls having had similar pain in the past, but states that previous chest pain this was transient and resolved after only a few seconds  On ROS, he admits to having chronic and unchanged cough and dyspnea  He also mentions that he has recently had occasional right calf pain and left thigh pain  He denies any recent fevers, chills, nausea, vomiting, abdominal pain, weight change, change in urinary/bowel function, lower extremity swelling, or complaints other than stated above  Patient denies any history of or known risk factors for VTE  Prior to Admission Medications   Prescriptions Last Dose Informant Patient Reported? Taking?    amLODIPine (NORVASC) 10 mg tablet   Yes Yes   Sig: Take 10 mg by mouth daily   aspirin (ECOTRIN LOW STRENGTH) 81 mg EC tablet   Yes Yes   Sig: Take 81 mg by mouth daily   atorvastatin (LIPITOR) 40 mg tablet   Yes Yes   Sig: Take 40 mg by mouth daily   cloNIDine (CATAPRES) 0 2 mg tablet   No Yes   Sig: Take 0 5 tablets by mouth 2 (two) times a day   fenofibrate (TRICOR) 145 mg tablet   Yes Yes   Sig: Take 145 mg by mouth daily   lidocaine (LIDODERM) 5 %   No Yes   Sig: Place 1 patch on the skin every 24 hours for 7 doses Remove & Discard patch within 12 hours or as directed by MD   lisinopril-hydrochlorothiazide (PRINZIDE,ZESTORETIC) 20-25 MG per tablet   Yes Yes   Sig: Take 1 tablet by mouth daily   nicotine (NICODERM CQ) 21 mg/24 hr TD 24 hr patch   No Yes   Sig: Place 1 patch on the skin every 24 hours   omeprazole-sodium bicarbonate (ZEGERID)  MG per capsule   Yes Yes   Sig: Take 1 capsule by mouth every morning before breakfast   sitaGLIPtin (JANUVIA) 25 mg tablet   Yes Yes   Sig: Take 25 mg by mouth daily   tamsulosin (FLOMAX) 0 4 mg   Yes Yes   Sig: Take 0 4 mg by mouth daily with dinner   tiZANidine (ZANAFLEX) 4 mg tablet   Yes Yes   Sig: Take 4 mg by mouth daily at bedtime      Facility-Administered Medications: None       Past Medical History:   Diagnosis Date    Diabetes mellitus ([facility] [address] )     Heroin abuse     "20 years" snorting heroin; last use 1 week ago   Hyperlipidemia     Hypertension        History reviewed  No pertinent surgical history  History reviewed  No pertinent family history  I have reviewed and agree with the history as documented  Social History   Substance Use Topics    Smoking status: Current Some Day Smoker     Packs/day: 0 25    Smokeless tobacco: Never Used    Alcohol use No        Review of Systems   Constitutional: Negative for chills and fever  Respiratory: Positive for shortness of breath  Cardiovascular: Positive for chest pain  Gastrointestinal: Negative for abdominal pain, nausea and vomiting  Musculoskeletal: Negative for back pain  Skin: Negative for rash and wound  Allergic/Immunologic: Negative for immunocompromised state  Neurological: Negative for headaches  Psychiatric/Behavioral: The patient is not nervous/anxious  All other systems reviewed and are negative        Physical Exam  ED Triage Vitals [04/19/18 1501]   Temperature Pulse Respirations Blood Pressure SpO2   97 6 °F (36 4 °C) 75 20 156/69 97 %      Temp Source Heart Rate Source Patient Position - Orthostatic VS BP Location FiO2 (%)   Oral Monitor Sitting Right arm --      Pain Score       Worst Possible Pain           Orthostatic Vital Signs  Vitals:    04/19/18 1501 04/19/18 1625 04/19/18 1722   BP: 156/69 138/63 147/70   Pulse: 75 60 57   Patient Position - Orthostatic VS: Sitting Lying Lying       Physical Exam   Constitutional: He is oriented to person, place, and time  He appears well-nourished  No distress  HENT:   Head: Normocephalic and atraumatic  Eyes: EOM are normal    Neck: Normal range of motion  Neck supple  Cardiovascular: Normal rate and regular rhythm  Pulmonary/Chest: Effort normal  No respiratory distress  He has decreased breath sounds in the left upper field  Abdominal: Soft  He exhibits no distension  There is no tenderness  Musculoskeletal: Normal range of motion  Neurological: He is alert and oriented to person, place, and time  Skin: Skin is warm and dry  He is not diaphoretic  Psychiatric: He has a normal mood and affect  His behavior is normal    Nursing note and vitals reviewed        ED Medications  Medications   iohexol (OMNIPAQUE) 350 MG/ML injection (MULTI-DOSE) 85 mL (85 mL Intravenous Given 4/19/18 1644)   azithromycin (ZITHROMAX) tablet 500 mg (500 mg Oral Given 4/19/18 1756)   naproxen (NAPROSYN) tablet 500 mg (500 mg Oral Given 4/19/18 1756)       Diagnostic Studies  Results Reviewed     Procedure Component Value Units Date/Time    Protime-INR [[id number]]  (Normal) Collected:  04/19/18 1542    Lab Status:  Final result Specimen:  Blood from Arm, Right Updated:  04/19/18 1611     Protime 12 5 seconds      INR 0 93    APTT [[id number]]  (Normal) Collected:  04/19/18 1542    Lab Status:  Final result Specimen:  Blood from Arm, Right Updated:  04/19/18 1611     PTT 30 seconds     Troponin I [[id number]]  (Normal) Collected:  04/19/18 1542 Lab Status:  Final result Specimen:  Blood from Arm, Right Updated:  04/19/18 1605     Troponin I <0 02 ng/mL     Narrative:         [employer] Chemistry analyzer 99% cutoff is > 0 04 ng/mL in network labs    o cTnI 99% cutoff is useful only when applied to patients in the clinical setting of myocardial ischemia  o cTnI 99% cutoff should be interpreted in the context of clinical history, ECG findings and possibly cardiac imaging to establish correct diagnosis  o cTnI 99% cutoff may be suggestive but clearly not indicative of a coronary event without the clinical setting of myocardial ischemia      CBC and differential [[id number]]  (Abnormal) Collected:  04/19/18 1542    Lab Status:  Final result Specimen:  Blood from Arm, Right Updated:  04/19/18 1559     WBC 7 82 Thousand/uL      RBC 4 25 Million/uL      Hemoglobin 12 7 g/dL      Hematocrit 39 6 %      MCV 93 fL      MCH 29 9 pg      MCHC 32 1 g/dL      RDW 14 7 %      MPV 12 9 (H) fL      Platelets 265 Thousands/uL      nRBC 0 /100 WBCs      Neutrophils Relative 59 %      Lymphocytes Relative 28 %      Monocytes Relative 8 %      Eosinophils Relative 5 %      Basophils Relative 0 %      Neutrophils Absolute 4 56 Thousands/µL      Lymphocytes Absolute 2 20 Thousands/µL      Monocytes Absolute 0 66 Thousand/µL      Eosinophils Absolute 0 38 Thousand/µL      Basophils Absolute 0 02 Thousands/µL     Basic metabolic panel [[id number]] Collected:  04/19/18 1542    Lab Status:  Final result Specimen:  Blood from Arm, Right Updated:  04/19/18 1556     Sodium 144 mmol/L      Potassium 4 5 mmol/L      Chloride 106 mmol/L      CO2 31 mmol/L      Anion Gap 7 mmol/L      BUN 21 mg/dL      Creatinine 1 16 mg/dL      Glucose 137 mg/dL      Calcium 9 0 mg/dL      eGFR 63 ml/min/1 73sq m     Narrative:         National Kidney Disease Education Program recommendations are as follows:  GFR calculation is accurate only with a steady state creatinine  Chronic Kidney disease less than 60 ml/min/1 73 sq  meters  Kidney failure less than 15 ml/min/1 73 sq  meters  CTA ED chest PE study   Final Result by Eber Biggs DO (04/19 1720)      1  No evidence of pulmonary embolus  2  Mild COPD  Probable small amount of layering mucoid debris within the right mainstem bronchus  Cannot exclude some associated posterior wall thickening  I would recommend a follow-up CT chest in 3 months following strong cough maneuvers to    reevaluate this finding and exclude possibility of subtle endobronchial lesion  3  Mild peribronchial thickening in the lower lobes, nonspecific although could reflect bronchitis in the appropriate clinical scenario  No evidence of pneumonia  Probable 11 mm focal fissural thickening lateral aspect right major fissure  This can    also be reevaluated at interval follow-up  I personally discussed this study and recommendations with Dr Della Mcadams on 4/19/2018 at 5:18 PM       The case currently cannot be marked for follow-up in Epic as the order record is being used elsewhere by the attending ER physician  Workstation performed: [id number]               Procedures  Procedures      Phone Consults  ED Phone Contact    ED Course  ED Course as of Apr 22 0041   u Apr 19, 2018   1542 EKG: NSR @ 64 BPM    1719 Follow up ct in 3 months  Mild peribronchial thickening                                MDM  Number of Diagnoses or Management Options  Abnormal finding on CT scan:   Bronchitis:   Right-sided chest pain:   Diagnosis management comments: 71-year-old male returning to ED for further evaluation of right-sided chest pain  Underwent CXR yesterday  Will obtain PE study, cardiac workup  Dispo pending  * Patient made aware of all lab and CT findings  Aware of concern for abnormality on CT, cannot rule out malignancy  Patient understands importance of close follow up   He was discharged home with information for [employer], as the PCP he has on file does not take his insurance and he would like a new provider  [facility] Time    Disposition  Final diagnoses:   Right-sided chest pain   Abnormal finding on CT scan   Bronchitis     Time reflects when diagnosis was documented in both MDM as applicable and the Disposition within this note     Time User Action Codes Description Comment    4/19/2018  5:40 PM Deidre Cordon Add [R07 9] Right-sided chest pain     4/19/2018  5:40 PM Deidre Mccarthy [R93 8] Abnormal finding on CT scan     4/19/2018  5:40 PM Fabio [address] Bronchitis       ED Disposition     ED Disposition Condition Comment    Discharge  [place] discharge to home/self care  Condition at discharge: Good        Follow-up Information     Follow up With Specialties Details Why Contact Luis Hooks MD  In 3 months Repeat CT chest **  [address]   [phone]      [employer]   To set up new primary care physician [phone]          Discharge Medication List as of 4/19/2018  5:53 PM      START taking these medications    Details   azithromycin (ZITHROMAX) 250 mg tablet Take 1 tablet (250 mg total) by mouth daily for 4 days, Starting Thu 4/19/2018, Until Mon 4/23/2018, Print      naproxen (NAPROSYN) 500 mg tablet Take 1 tablet (500 mg total) by mouth 2 (two) times a day as needed for moderate pain for up to 7 days, Starting Thu 4/19/2018, Until Thu 4/26/2018, Print         CONTINUE these medications which have NOT CHANGED    Details   amLODIPine (NORVASC) 10 mg tablet Take 10 mg by mouth daily, Historical Med      aspirin (ECOTRIN LOW STRENGTH) 81 mg EC tablet Take 81 mg by mouth daily, Historical Med      atorvastatin (LIPITOR) 40 mg tablet Take 40 mg by mouth daily, Historical Med      cloNIDine (CATAPRES) 0 2 mg tablet Take 0 5 tablets by mouth 2 (two) times a day, Starting Fri 10/27/2017, Print      fenofibrate (TRICOR) 145 mg tablet Take 145 mg by mouth daily, Historical Med      lidocaine (LIDODERM) 5 % Place 1 patch on the skin every 24 hours for 7 doses Remove & Discard patch within 12 hours or as directed by MD, Starting Wed 4/18/2018, Until Wed 4/25/2018, Print      lisinopril-hydrochlorothiazide (PRINZIDE,ZESTORETIC) 20-25 MG per tablet Take 1 tablet by mouth daily, Historical Med      nicotine (NICODERM CQ) 21 mg/24 hr TD 24 hr patch Place 1 patch on the skin every 24 hours, Starting Fri 3/16/2018, Print      omeprazole-sodium bicarbonate (ZEGERID)  MG per capsule Take 1 capsule by mouth every morning before breakfast, Historical Med      sitaGLIPtin (JANUVIA) 25 mg tablet Take 25 mg by mouth daily, Historical Med      tamsulosin (FLOMAX) 0 4 mg Take 0 4 mg by mouth daily with dinner, Historical Med      tiZANidine (ZANAFLEX) 4 mg tablet Take 4 mg by mouth daily at bedtime, Historical Med           No discharge procedures on file  ED Provider  Attending physically available and evaluated Julian Weinstein  I managed the patient along with the ED Attending      Electronically Signed by         Mitch Bailon MD  04/22/18 8806

## 2018-04-19 NOTE — DISCHARGE INSTRUCTIONS
Bronquitis aguda   LO QUE NECESITA SABER:   La bronquitis aguda es la inflamación e irritación de sailaja vías respiratorias  Esta irritación puede provocar que tosa o que tenga otros problemas de respiración  La bronquitis aguda suele comenzar debido a otra enfermedad, debby un resfriado o la gripe  La enfermedad se propaga de farrell nariz y garganta a farrell tráquea y Reesa Perez  La bronquitis a menudo es conocida debby resfriado de pecho  La bronquitis aguda generalmente dura alrededor de 3 a 6 semanas y no es talon enfermedad grave  La tos podría durar por varias semanas  INSTRUCCIONES SOBRE EL OFE HOSPITALARIA:   Regrese a la calvin de emergencias si:   · Usted expectora julio  · Sailaja labios o uñas de los dedos se ponen azules  · Usted siente que no está recibiendo suficiente aire cuando respira  Pregúntele a farrell Shannon Diesel vitaminas y minerales son adecuados para usted  · Usted tiene fiebre  · Sailaja problemas respiratorios no desaparecen o empeoran  · Farrell tos no mejora dentro de 4 semanas  · Usted tiene preguntas o inquietudes acerca de farrell condición o cuidado  Cuidados personales:   · Descanse más  El descanso ayuda a farrell cuerpo a sanar  Empiece a hacer un poco más día a día  Descanse cuando usted sienta que es necesario  · Evite irritantes en el aire  Evite productos químicos, gases y polvo  Use talon mascarilla si tiene que trabajar alrededor de polvo o gases  Permanezca dentro cuando los niveles de contaminación anne marie altos  Si tiene alergias, permanezca adentro cuando el conteo de polen sea CROSSCANONBY  No use productos en aerosol, debby desodorante, aerosol contra los insectos y aerosol para el karen  · No fume o esté alrededor de personas que fuman  La nicotina y otros químicos en los cigarrillos y cigarros dañan la cilia que saca la mucosidad de sailaja pulmones  Pida información a farrell médico si usted actualmente fuma y necesita ayuda para dejar de fumar   Los cigarrillos electrónicos o tabaco sin humo todavía contienen nicotina  Consulte con farrell médico antes de [employer]  · [address] se le haya indicado  Los líquidos ayudan a mantener humectadas ashley vías respiratorias y ayudarle a eliminar las flemas por medio de la tos  Es posible que usted necesite zenobia más líquidos si tiene bronquitis [place]  Pregunte cuánto líquido debe zenobia cada día y cuáles líquidos son los más adecuados para usted  · Use un humidificador o vaporizador  Use un humidificador de tracie frío o un vaporizador para elevar la humedad en farrell casa  [place] podría ayudarle a respirar más fácilmente y al mismo tiempo disminuir la tos  Disminuya farrell riesgo para la bronquitis aguda:   · Vaya a que le pongan las vacunas necesarias  Pregúntele a farrell médico si usted debería ser vacunado contra la gripe o la neumonía  · Evite la propagación de gérmenes  Usted puede disminuir farrell riesgo de bronquitis [place] y otras enfermedades de la siguiente manera:     ¨ [place] frecuentemente con agua y [place]  Lleve talon loción o gel antiséptico para las pauly  Usted puede usar la loción o el gel para limpiar ashley pauly cuando no haya agua disponible  ¨ No se toque los ojos, la nariz o la boca a menos que se haya lavado las pauly alesha  ¨ Siempre cubra farrell boca al toser para evitar la propagación de gérmenes  Lo mejor es toser en un pañuelo desechable o en la manga de farrell camisa, en lugar de en farrell mano  [place] a los que le rodean que cubran ashley bocas al toser  ¨ Trate de evitar a las personas que están resfriadas o tienen gripe  Si usted está enfermo, manténgase alejado de otras personas lo más que pueda  Medicamentos:  Farrell médico podría  darle cualquiera de lo siguiente:  · El ibuprofeno o el acetaminofeno  son medicamentos que pueden ayudar a bajar farrell fiebre  Están disponibles sin receta médica  Consulte con farrell médico cuál medicamento es el adecuado para usted  Pregunte la cantidad y la frecuencia con que debe tomarlos  [address]  Estos medicamentos pueden provocar sangrado estomacal si no se dana correctamente  El ibuprofeno puede provocar daño al Jose Guadalupe Hosteller  No tome ibuprofeno si usted tiene enfermedad de los riñones, [place] o si es alérgico a la aspirina  El acetaminofeno puede dañar el hígado  No tome más de 4,000 miligramos en 24 horas  · Los descongestionantes  ayudan a despejar la mucosidad en ashley pulmones y que sea más fácil expectorarla  [place] puede ayudarle a respirar mejor  · Los jarabes para la tos  disminuyen farrell necesidad de toser  Si farrell tos produce mucosidad, no tome un supresor de la tos a menos que farrell médico se lo indique  Farrell médico podría sugerirle que tome un supresor de la tos en la noche para que pueda descansar  · Inhaladores  podrían ser Mariangel Lacer  Farrell médico podría darle vasile o más inhaladores para ayudarle a respirar más fácil y [place] [employer] menos tos  Un inhalador le administra medicamento para abrir ashley vías aéreas  [place]dale a farrell médico que le muestre cómo usar farrell inhalador correctamente  · [place] ashley medicamentos debby se le haya indicado  Consulte con farrell médico si usted rosio que farrell medicamento no le está ayudando o si presenta efectos secundarios  Infórmele si es alérgico a algún medicamento  Mantenga talon lista actualizada de los [place], las vitaminas y los productos herbales que giovanni  Incluya los siguientes datos de los medicamentos: cantidad, frecuencia y motivo de administración  Traiga con usted la lista o los envases de la píldoras a ashley citas de seguimiento  Lleve la lista de los medicamentos con usted en hector de talon emergencia  Acuda a ashley consultas de control con farrell médico según le indicaron  Escriba las preguntas que tenga para que recuerde hacerlas jesusita ashley citas de seguimiento  © 2017 [address] Information is for End User's use only and may not be sold, redistributed or otherwise used for commercial purposes   All illustrations and images included in [facility] [employer] are the copyrighted property of A D A M , Inc  or Surya Marquez  Esta información es sólo para uso en educación  Farrell intención no es darle un consejo médico sobre enfermedades o tratamientos  Colsulte con farrell Dorna Bhavin farmacéutico antes de seguir cualquier régimen médico para saber si es seguro y efectivo para usted  Dolor de pecho   LO QUE NECESITA SABER:   El dolor en el pecho puede ser provocado por talon variedad de condiciones, algunas no tan serias y otras que son de peligro mortal  [employer] ser un síntoma de un problema digestivo, debby la acidez o talon úlcera estomacal  Un ataque de ansiedad o talon emoción geo, debby el enojo, también pueden provocar dolor de [place]  Talon infección, inflamación o fractura en un hueso o cartílago en el pecho podría provocar dolor o molestia  En ocasiones el dolor torácico o la presión en el pecho pueden ser el resultado de eli circulación de la julio al corazón (angina)  El dolor de pecho también puede ser causado por trastornos potencialmente mortales debby un ataque al corazón o un coágulo de [employer]  INSTRUCCIONES SOBRE EL OFE HOSPITALARIA:   Llame al 911 si presenta:   · Usted tiene alguno de los siguientes signos de un ataque cardíaco:      ¨ Estornudos, presión, o dolor en farrell pecho que dura mas de 5 minutos o regresa  ¨ Malestar o dolor en farrell espalda, shaneka, mandíbula, abdomen, o brazo     ¨ Dificultad para respirar    ¨ Náuseas o vómito    ¨ Siente un desvanecimiento o tiene sudores fríos especialmente en el pecho o dificultad para respirar  Regrese a la calvin de emergencias si:   · La inflamación en farrell pecho empeora, aun con tratamiento  · Usted tose o vomita julio  · Sailaja heces son negras o tienen julio  · Usted no puede dejar de vomitar o le duele al tragar  Pregúntele a farrell Jannifer Barrette vitaminas y minerales son adecuados para usted     · Usted tiene preguntas o inquietudes acerca de farrell condición o cuidado  Medicamentos:   · Medicamentos,  pueden administrarse para tratar la causa del dolor de pecho  Por ejemplo, analgésicos, medicamentos para la ansiedad o medicamentos para aumentar el flujo de julio al corazón  · No tome ciertos medicamentos sin antes preguntarle a farrell médico   Estos incluyen LARA, suplementos vitamínicos o a base de hierbas u hormonas (estrógeno o progestágeno)  · [place] ashley medicamentos debby se le haya indicado  Consulte con farrell médico si usted rosio que farrell medicamento no le está ayudando o si presenta efectos secundarios  Infórmele si es alérgico a cualquier medicamento  Mantenga talon lista actualizada de los [place], las vitaminas y los productos herbales que giovanni  Incluya los siguientes datos de los medicamentos: cantidad, frecuencia y motivo de administración  Traiga con usted la lista o los envases de la píldoras a ashley citas de seguimiento  Lleve la lista de los medicamentos con usted en hector de talon emergencia  Programe talon roman con farrell médico dentro de 67 horas o debby se le indique:  Es posible que deba regresar para hacerse más pruebas para encontrar la causa del dolor de [place]  Es probable que lo refieran a un especialista, debby un cardiólogo o un gastroenterólogo  Anote ashley preguntas para que se acuerde de hacerlas jesusita ashley visitas  Consejos para vivir saludable:  Los siguientes son consejos generales de benita  Si farrell dolor de pecho es causado por un problema cardíaco, farrell médico le dará consejos específicos a seguir  · No fume  La nicotina y otros químicos contenidos en los cigarrillos y cigarros pueden causar daño a ashley pulmones y el corazón  Pida información a farrell médico si usted actualmente fuma y necesita ayuda para dejar de fumar  Los cigarrillos electrónicos o tabaco sin humo todavía contienen nicotina  Consulte con farrell médico antes de [employer]       · Consuma talon variedad de alimentos saludables y bajos en grasas  Los alimentos saludables incluyen frutas, verduras, pan integral, productos lácteos bajos en grasa, frijoles, all magras y pescado  Pida más información acerca de talon dieta saludable para el corazón  · Pregunte acerca de la [place]  Cade médico le dirá cuáles actividades limitar y cuáles evitar  Pregunte cuándo [employer], regresar a cade trabajo y [employer]  Pida más información acerca de un plan de ejercicio adecuado para usted  · Mantenga un peso saludable  Consulte con cade médico cuánto debería pesar  [place] que lo ayude a crear un plan para bajar de peso si tiene sobrepeso  · Póngase la vacuna de la gripe y la neumonía  Todos los adultos deberían recibir la vacuna de la influenza (gripe)  Vacúnese cada año nava pronto debby la vacuna esté disponible  La vacuna neumocócica se le aplica a adultos de 72 o más años de [place]  La vacuna se aplica cada 5 años para prevenir enfermedades neumocócicas, debby la neumonía  © 2017 [address] Information is for End User's use only and may not be sold, redistributed or otherwise used for commercial purposes  All illustrations and images included in CareNotes® are the copyrighted property of A D A M , Inc  or Surya Marquez  Esta información es sólo para uso en educación  Cade intención no es darle un consejo médico sobre enfermedades o tratamientos  Colsulte con cade Benuel Terry farmacéutico antes de seguir cualquier régimen médico para saber si es seguro y efectivo para usted

## 2018-04-19 NOTE — ED ATTENDING ATTESTATION
Alvarez Gray MD, saw and evaluated the patient  I have discussed the patient with the resident/non-physician practitioner and agree with the resident's/non-physician practitioner's findings, Plan of Care, and MDM as documented in the resident's/non-physician practitioner's note, except where noted  All available labs and Radiology studies were reviewed  At this point I agree with the current assessment done in the Emergency Department  I have conducted an independent evaluation of this patient a history and physical is as follows:    70 YO male returns for chest pain  States he fell 4 days ago, no pain at the time but yesterday had worsening Right chest pain  Pt had an x-ray that was normal  Was given a patch for discomfort but this has not helped  Pt additionally notes Right sided calf and thigh pain, denies Hx of DVT or PE  Pt denies CP/SOB/F/C/N/V/D/C, no dysuria, burning on urination or blood in urine  Gen: Pt is in NAD  HEENT: Head is atraumatic, EOM's intact, neck has FROM  Chest: CTAB, non-tender, decreased lung sounds, tender to palpation over the right chest   Heart: RRR  Abdomen: Soft, NT/ND  Musculoskeletal: FROM in all extremities  Skin: No rash, no ecchymosis  Neuro: Awake, alert, oriented x4; Cranial nerves II-XII intact  Psych: Normal affect    MDM - Pt with continued pain, seen yesterday for same  No states having some Right calf pain as well  Will check ECG, troponin, electrolytes, CBC  Will CT chest for PE        Critical Care Time  CritCare Time    Procedures

## 2018-06-21 ENCOUNTER — APPOINTMENT (EMERGENCY)
Dept: CT IMAGING | Facility: HOSPITAL | Age: 73
DRG: 070 | End: 2018-06-21
Payer: COMMERCIAL

## 2018-06-21 ENCOUNTER — HOSPITAL ENCOUNTER (INPATIENT)
Facility: HOSPITAL | Age: 73
LOS: 5 days | Discharge: HOME | DRG: 070 | End: 2018-06-26
Attending: EMERGENCY MEDICINE | Admitting: INTERNAL MEDICINE
Payer: COMMERCIAL

## 2018-06-21 DIAGNOSIS — F11.23 OPIATE WITHDRAWAL (HCC): ICD-10-CM

## 2018-06-21 DIAGNOSIS — E87.5 HYPERKALEMIA: ICD-10-CM

## 2018-06-21 DIAGNOSIS — I10 ESSENTIAL HYPERTENSION: ICD-10-CM

## 2018-06-21 DIAGNOSIS — N17.9 ACUTE RENAL FAILURE (ARF) (HCC): Primary | ICD-10-CM

## 2018-06-21 DIAGNOSIS — Z71.6 TOBACCO ABUSE COUNSELING: Chronic | ICD-10-CM

## 2018-06-21 DIAGNOSIS — J96.02 ACUTE RESPIRATORY FAILURE WITH HYPERCAPNIA (HCC): ICD-10-CM

## 2018-06-21 DIAGNOSIS — R41.0 ACUTE DELIRIUM: ICD-10-CM

## 2018-06-21 PROBLEM — N18.9 ACUTE KIDNEY INJURY SUPERIMPOSED ON CHRONIC KIDNEY DISEASE (HCC): Status: ACTIVE | Noted: 2018-06-21

## 2018-06-21 PROBLEM — F11.10 OPIOID ABUSE (HCC): Status: ACTIVE | Noted: 2018-06-21

## 2018-06-21 LAB
ALBUMIN SERPL BCP-MCNC: 3.8 G/DL (ref 3.5–5)
ALP SERPL-CCNC: 88 U/L (ref 46–116)
ALT SERPL W P-5'-P-CCNC: 23 U/L (ref 12–78)
ANION GAP SERPL CALCULATED.3IONS-SCNC: 10 MMOL/L (ref 4–13)
ANION GAP SERPL CALCULATED.3IONS-SCNC: 11 MMOL/L (ref 4–13)
ARTERIAL PATENCY WRIST A: YES
AST SERPL W P-5'-P-CCNC: 19 U/L (ref 5–45)
BACTERIA UR QL AUTO: ABNORMAL /HPF
BASE EXCESS BLDA CALC-SCNC: -3.5 MMOL/L
BASOPHILS # BLD AUTO: 0.02 THOUSANDS/ΜL (ref 0–0.1)
BASOPHILS NFR BLD AUTO: 0 % (ref 0–1)
BILIRUB DIRECT SERPL-MCNC: 0.07 MG/DL (ref 0–0.2)
BILIRUB SERPL-MCNC: 0.34 MG/DL (ref 0.2–1)
BILIRUB UR QL STRIP: ABNORMAL
BUN SERPL-MCNC: 71 MG/DL (ref 5–25)
BUN SERPL-MCNC: 75 MG/DL (ref 5–25)
CALCIUM SERPL-MCNC: 8.7 MG/DL (ref 8.3–10.1)
CALCIUM SERPL-MCNC: 9.9 MG/DL (ref 8.3–10.1)
CHLORIDE SERPL-SCNC: 101 MMOL/L (ref 100–108)
CHLORIDE SERPL-SCNC: 105 MMOL/L (ref 100–108)
CLARITY UR: CLEAR
CO2 SERPL-SCNC: 24 MMOL/L (ref 21–32)
CO2 SERPL-SCNC: 26 MMOL/L (ref 21–32)
COLOR UR: YELLOW
CREAT SERPL-MCNC: 6.14 MG/DL (ref 0.6–1.3)
CREAT SERPL-MCNC: 6.48 MG/DL (ref 0.6–1.3)
CRYSTALS URNS QL MICRO: ABNORMAL /HPF
EOSINOPHIL # BLD AUTO: 0.36 THOUSAND/ΜL (ref 0–0.61)
EOSINOPHIL NFR BLD AUTO: 4 % (ref 0–6)
ERYTHROCYTE [DISTWIDTH] IN BLOOD BY AUTOMATED COUNT: 15.3 % (ref 11.6–15.1)
GFR SERPL CREATININE-BSD FRML MDRD: 8 ML/MIN/1.73SQ M
GFR SERPL CREATININE-BSD FRML MDRD: 8 ML/MIN/1.73SQ M
GLUCOSE SERPL-MCNC: 110 MG/DL (ref 65–140)
GLUCOSE SERPL-MCNC: 142 MG/DL (ref 65–140)
GLUCOSE SERPL-MCNC: 63 MG/DL (ref 65–140)
GLUCOSE SERPL-MCNC: 73 MG/DL (ref 65–140)
GLUCOSE UR STRIP-MCNC: NEGATIVE MG/DL
HCO3 BLDA-SCNC: 22.2 MMOL/L (ref 22–28)
HCT VFR BLD AUTO: 36.7 % (ref 36.5–49.3)
HGB BLD-MCNC: 11.7 G/DL (ref 12–17)
HGB UR QL STRIP.AUTO: NEGATIVE
HYALINE CASTS #/AREA URNS LPF: ABNORMAL /LPF
KETONES UR STRIP-MCNC: ABNORMAL MG/DL
LEUKOCYTE ESTERASE UR QL STRIP: ABNORMAL
LYMPHOCYTES # BLD AUTO: 2.34 THOUSANDS/ΜL (ref 0.6–4.47)
LYMPHOCYTES NFR BLD AUTO: 24 % (ref 14–44)
MCH RBC QN AUTO: 30.2 PG (ref 26.8–34.3)
MCHC RBC AUTO-ENTMCNC: 31.9 G/DL (ref 31.4–37.4)
MCV RBC AUTO: 95 FL (ref 82–98)
MONOCYTES # BLD AUTO: 0.9 THOUSAND/ΜL (ref 0.17–1.22)
MONOCYTES NFR BLD AUTO: 9 % (ref 4–12)
NEUTROPHILS # BLD AUTO: 6.17 THOUSANDS/ΜL (ref 1.85–7.62)
NEUTS SEG NFR BLD AUTO: 63 % (ref 43–75)
NITRITE UR QL STRIP: NEGATIVE
NON VENT ROOM AIR: 21 %
NON-SQ EPI CELLS URNS QL MICRO: ABNORMAL /HPF
NRBC BLD AUTO-RTO: 0 /100 WBCS
O2 CT BLDA-SCNC: 15.7 ML/DL (ref 16–23)
OXYHGB MFR BLDA: 93.4 % (ref 94–97)
PCO2 BLDA: 42.5 MM HG (ref 36–44)
PH BLDA: 7.34 [PH] (ref 7.35–7.45)
PH UR STRIP.AUTO: 5 [PH] (ref 4.5–8)
PLATELET # BLD AUTO: 146 THOUSANDS/UL (ref 149–390)
PMV BLD AUTO: 12.8 FL (ref 8.9–12.7)
PO2 BLDA: 70.8 MM HG (ref 75–129)
POTASSIUM SERPL-SCNC: 5.4 MMOL/L (ref 3.5–5.3)
POTASSIUM SERPL-SCNC: 5.6 MMOL/L (ref 3.5–5.3)
PROT SERPL-MCNC: 6.9 G/DL (ref 6.4–8.2)
PROT UR STRIP-MCNC: ABNORMAL MG/DL
RBC # BLD AUTO: 3.87 MILLION/UL (ref 3.88–5.62)
RBC #/AREA URNS AUTO: ABNORMAL /HPF
SODIUM SERPL-SCNC: 138 MMOL/L (ref 136–145)
SODIUM SERPL-SCNC: 139 MMOL/L (ref 136–145)
SP GR UR STRIP.AUTO: >=1.03 (ref 1–1.03)
SPECIMEN SOURCE: ABNORMAL
UROBILINOGEN UR QL STRIP.AUTO: 0.2 E.U./DL
WBC # BLD AUTO: 9.79 THOUSAND/UL (ref 4.31–10.16)
WBC #/AREA URNS AUTO: ABNORMAL /HPF

## 2018-06-21 PROCEDURE — 96375 TX/PRO/DX INJ NEW DRUG ADDON: CPT

## 2018-06-21 PROCEDURE — 81001 URINALYSIS AUTO W/SCOPE: CPT

## 2018-06-21 PROCEDURE — 36415 COLL VENOUS BLD VENIPUNCTURE: CPT | Performed by: EMERGENCY MEDICINE

## 2018-06-21 PROCEDURE — 70450 CT HEAD/BRAIN W/O DYE: CPT

## 2018-06-21 PROCEDURE — 80076 HEPATIC FUNCTION PANEL: CPT | Performed by: EMERGENCY MEDICINE

## 2018-06-21 PROCEDURE — 74176 CT ABD & PELVIS W/O CONTRAST: CPT

## 2018-06-21 PROCEDURE — 83930 ASSAY OF BLOOD OSMOLALITY: CPT | Performed by: EMERGENCY MEDICINE

## 2018-06-21 PROCEDURE — 82948 REAGENT STRIP/BLOOD GLUCOSE: CPT

## 2018-06-21 PROCEDURE — 96361 HYDRATE IV INFUSION ADD-ON: CPT

## 2018-06-21 PROCEDURE — 87086 URINE CULTURE/COLONY COUNT: CPT

## 2018-06-21 PROCEDURE — 99285 EMERGENCY DEPT VISIT HI MDM: CPT

## 2018-06-21 PROCEDURE — 85025 COMPLETE CBC W/AUTO DIFF WBC: CPT | Performed by: EMERGENCY MEDICINE

## 2018-06-21 PROCEDURE — 96372 THER/PROPH/DIAG INJ SC/IM: CPT

## 2018-06-21 PROCEDURE — 36600 WITHDRAWAL OF ARTERIAL BLOOD: CPT

## 2018-06-21 PROCEDURE — 93005 ELECTROCARDIOGRAM TRACING: CPT

## 2018-06-21 PROCEDURE — 96374 THER/PROPH/DIAG INJ IV PUSH: CPT

## 2018-06-21 PROCEDURE — 80048 BASIC METABOLIC PNL TOTAL CA: CPT | Performed by: EMERGENCY MEDICINE

## 2018-06-21 PROCEDURE — 82805 BLOOD GASES W/O2 SATURATION: CPT | Performed by: EMERGENCY MEDICINE

## 2018-06-21 PROCEDURE — 81002 URINALYSIS NONAUTO W/O SCOPE: CPT | Performed by: EMERGENCY MEDICINE

## 2018-06-21 RX ORDER — CLONIDINE HYDROCHLORIDE 0.1 MG/1
0.1 TABLET ORAL 2 TIMES DAILY
Status: DISCONTINUED | OUTPATIENT
Start: 2018-06-22 | End: 2018-06-26 | Stop reason: HOSPADM

## 2018-06-21 RX ORDER — CALCIUM CHLORIDE 100 MG/ML
1 INJECTION INTRAVENOUS; INTRAVENTRICULAR ONCE
Status: COMPLETED | OUTPATIENT
Start: 2018-06-21 | End: 2018-06-21

## 2018-06-21 RX ORDER — HEPARIN SODIUM 5000 [USP'U]/ML
5000 INJECTION, SOLUTION INTRAVENOUS; SUBCUTANEOUS EVERY 8 HOURS SCHEDULED
Status: DISCONTINUED | OUTPATIENT
Start: 2018-06-21 | End: 2018-06-26 | Stop reason: HOSPADM

## 2018-06-21 RX ORDER — KETOROLAC TROMETHAMINE 30 MG/ML
15 INJECTION, SOLUTION INTRAMUSCULAR; INTRAVENOUS ONCE
Status: COMPLETED | OUTPATIENT
Start: 2018-06-21 | End: 2018-06-21

## 2018-06-21 RX ORDER — ONDANSETRON 2 MG/ML
4 INJECTION INTRAMUSCULAR; INTRAVENOUS EVERY 4 HOURS PRN
Status: DISCONTINUED | OUTPATIENT
Start: 2018-06-21 | End: 2018-06-25

## 2018-06-21 RX ORDER — ACETAMINOPHEN 325 MG/1
650 TABLET ORAL EVERY 6 HOURS PRN
Status: DISCONTINUED | OUTPATIENT
Start: 2018-06-21 | End: 2018-06-26 | Stop reason: HOSPADM

## 2018-06-21 RX ORDER — TAMSULOSIN HYDROCHLORIDE 0.4 MG/1
0.4 CAPSULE ORAL
Status: DISCONTINUED | OUTPATIENT
Start: 2018-06-22 | End: 2018-06-26 | Stop reason: HOSPADM

## 2018-06-21 RX ORDER — ATORVASTATIN CALCIUM 40 MG/1
40 TABLET, FILM COATED ORAL
Status: DISCONTINUED | OUTPATIENT
Start: 2018-06-22 | End: 2018-06-26 | Stop reason: HOSPADM

## 2018-06-21 RX ORDER — METHADONE HYDROCHLORIDE 10 MG/1
90 TABLET ORAL DAILY
Status: DISCONTINUED | OUTPATIENT
Start: 2018-06-22 | End: 2018-06-22

## 2018-06-21 RX ORDER — GABAPENTIN 300 MG/1
300 CAPSULE ORAL
Status: DISCONTINUED | OUTPATIENT
Start: 2018-06-21 | End: 2018-06-26 | Stop reason: HOSPADM

## 2018-06-21 RX ORDER — TIZANIDINE 4 MG/1
4 TABLET ORAL
Status: DISCONTINUED | OUTPATIENT
Start: 2018-06-21 | End: 2018-06-26 | Stop reason: HOSPADM

## 2018-06-21 RX ORDER — GABAPENTIN 300 MG/1
300 CAPSULE ORAL
Status: ON HOLD | COMMUNITY
Start: 2018-05-23 | End: 2021-09-24 | Stop reason: ALTCHOICE

## 2018-06-21 RX ORDER — DEXTROSE AND SODIUM CHLORIDE 5; .9 G/100ML; G/100ML
125 INJECTION, SOLUTION INTRAVENOUS CONTINUOUS
Status: DISCONTINUED | OUTPATIENT
Start: 2018-06-21 | End: 2018-06-23

## 2018-06-21 RX ORDER — NICOTINE 21 MG/24HR
14 PATCH, TRANSDERMAL 24 HOURS TRANSDERMAL DAILY
Status: DISCONTINUED | OUTPATIENT
Start: 2018-06-22 | End: 2018-06-26 | Stop reason: HOSPADM

## 2018-06-21 RX ORDER — DEXTROSE MONOHYDRATE 25 G/50ML
50 INJECTION, SOLUTION INTRAVENOUS ONCE
Status: COMPLETED | OUTPATIENT
Start: 2018-06-21 | End: 2018-06-21

## 2018-06-21 RX ORDER — SODIUM CHLORIDE 9 MG/ML
125 INJECTION, SOLUTION INTRAVENOUS CONTINUOUS
Status: DISCONTINUED | OUTPATIENT
Start: 2018-06-21 | End: 2018-06-21

## 2018-06-21 RX ORDER — CHLORHEXIDINE GLUCONATE 0.12 MG/ML
15 RINSE ORAL EVERY 12 HOURS SCHEDULED
Status: DISCONTINUED | OUTPATIENT
Start: 2018-06-21 | End: 2018-06-22

## 2018-06-21 RX ORDER — ASPIRIN 81 MG/1
81 TABLET ORAL DAILY
Status: DISCONTINUED | OUTPATIENT
Start: 2018-06-22 | End: 2018-06-26 | Stop reason: HOSPADM

## 2018-06-21 RX ORDER — AMLODIPINE BESYLATE 10 MG/1
10 TABLET ORAL DAILY
Status: DISCONTINUED | OUTPATIENT
Start: 2018-06-22 | End: 2018-06-26 | Stop reason: HOSPADM

## 2018-06-21 RX ADMIN — SODIUM CHLORIDE, PRESERVATIVE FREE 0.04 MG: 5 INJECTION INTRAVENOUS at 20:55

## 2018-06-21 RX ADMIN — CALCIUM CHLORIDE 1 G: 100 INJECTION INTRAVENOUS; INTRAVENTRICULAR at 21:10

## 2018-06-21 RX ADMIN — SODIUM CHLORIDE, PRESERVATIVE FREE 0.04 MG: 5 INJECTION INTRAVENOUS at 23:20

## 2018-06-21 RX ADMIN — KETOROLAC TROMETHAMINE 15 MG: 30 INJECTION, SOLUTION INTRAMUSCULAR at 18:51

## 2018-06-21 RX ADMIN — DEXTROSE MONOHYDRATE 50 ML: 25 INJECTION, SOLUTION INTRAVENOUS at 19:53

## 2018-06-21 RX ADMIN — SODIUM BICARBONATE 50 MEQ: 84 INJECTION INTRAVENOUS at 21:15

## 2018-06-21 RX ADMIN — DEXTROSE AND SODIUM CHLORIDE 125 ML/HR: 5; .9 INJECTION, SOLUTION INTRAVENOUS at 23:17

## 2018-06-21 RX ADMIN — SODIUM CHLORIDE 1000 ML: 0.9 INJECTION, SOLUTION INTRAVENOUS at 23:17

## 2018-06-21 RX ADMIN — INSULIN HUMAN 5 UNITS: 100 INJECTION, SOLUTION PARENTERAL at 19:51

## 2018-06-21 RX ADMIN — SODIUM CHLORIDE 1000 ML: 0.9 INJECTION, SOLUTION INTRAVENOUS at 19:50

## 2018-06-21 NOTE — ED PROVIDER NOTES
History  Chief Complaint   Patient presents with    Back Pain     Patient reports back pain that started 3-4 days ago  Patient reports pain radiating into his legs "sometimes " Patient denies bowel or bladder dysfunction   Weakness - Generalized     Patient reports that he has a new tremor and weakness  Patient states "everything I  I have dropped for the last 2 days " Patient alert to person, place, and sitatuion in triage  67 yo male c/o back pain he describes at several points, mostly bilateral low back, neck, mid back, without radiation or paresthesias, denies injury or fall  He has had similar back pain in the past   He also c/o shaking tremor and "dropping things' with both hands since yesterday  He denies balance or gait issues  History provided by:  Patient  Back Pain   Location:  Thoracic spine and lumbar spine  Quality:  Aching  Radiates to:  Does not radiate  Onset quality:  Gradual  Duration:  1 day  Timing:  Constant  Progression:  Waxing and waning  Chronicity:  Recurrent  Context: twisting    Context: not falling    Relieved by:  Nothing  Worsened by:  Twisting  Associated symptoms: no abdominal pain, no bladder incontinence, no bowel incontinence, no dysuria, no fever, no headaches, no leg pain, no numbness, no paresthesias and no perianal numbness        Prior to Admission Medications   Prescriptions Last Dose Informant Patient Reported? Taking?    METHADONE HCL PO 6/21/2018 at Unknown time  Yes Yes   Sig: Take 90 mg by mouth daily   amLODIPine (NORVASC) 10 mg tablet 6/21/2018 at Unknown time  Yes Yes   Sig: Take 10 mg by mouth daily   aspirin (ECOTRIN LOW STRENGTH) 81 mg EC tablet 6/21/2018 at Unknown time  Yes Yes   Sig: Take 81 mg by mouth daily   atorvastatin (LIPITOR) 40 mg tablet 6/21/2018 at Unknown time  Yes Yes   Sig: Take 40 mg by mouth daily   cloNIDine (CATAPRES) 0 2 mg tablet 6/21/2018 at Unknown time  No Yes   Sig: Take 0 5 tablets by mouth 2 (two) times a day   fenofibrate (TRICOR) 145 mg tablet 6/21/2018 at Unknown time  Yes Yes   Sig: Take 145 mg by mouth daily   gabapentin (NEURONTIN) 300 mg capsule 6/21/2018 at Unknown time  Yes Yes   Sig: Take 300 mg by mouth daily at bedtime   lisinopril-hydrochlorothiazide (PRINZIDE,ZESTORETIC) 20-25 MG per tablet 6/21/2018 at Unknown time  Yes Yes   Sig: Take 1 tablet by mouth daily   omeprazole-sodium bicarbonate (ZEGERID)  MG per capsule 6/21/2018 at Unknown time  Yes Yes   Sig: Take 1 capsule by mouth every morning before breakfast   sitaGLIPtin (JANUVIA) 25 mg tablet 6/21/2018 at Unknown time  Yes Yes   Sig: Take 25 mg by mouth daily   tamsulosin (FLOMAX) 0 4 mg 6/21/2018 at Unknown time  Yes Yes   Sig: Take 0 4 mg by mouth daily with dinner   tiZANidine (ZANAFLEX) 4 mg tablet 6/21/2018 at Unknown time  Yes Yes   Sig: Take 4 mg by mouth daily at bedtime      Facility-Administered Medications: None       Past Medical History:   Diagnosis Date    Asthma     Diabetes mellitus ([facility] [address] )     Heroin abuse     "20 years" snorting heroin; last use 1 week ago   Hyperlipidemia     Hypertension        History reviewed  No pertinent surgical history  History reviewed  No pertinent family history  I have reviewed and agree with the history as documented  Social History   Substance Use Topics    Smoking status: Current Some Day Smoker     Packs/day: 0 25    Smokeless tobacco: Never Used    Alcohol use No        Review of Systems   Constitutional: Negative for fever  Gastrointestinal: Negative for abdominal pain and bowel incontinence  Genitourinary: Negative for bladder incontinence and dysuria  Musculoskeletal: Positive for back pain  Neurological: Negative for numbness, headaches and paresthesias  All other systems reviewed and are negative  Physical Exam  Physical Exam   Constitutional: He is oriented to person, place, and time  He appears well-developed and well-nourished     HENT:   Head: Normocephalic and atraumatic  Right Ear: External ear normal    Left Ear: External ear normal    Nose: Nose normal    Mouth/Throat: Oropharynx is clear and moist    Eyes: Conjunctivae and EOM are normal  Pupils are equal, round, and reactive to light  Neck: Normal range of motion  Neck supple  Cardiovascular: Normal rate  Pulmonary/Chest: Effort normal    Abdominal: There is no tenderness  Musculoskeletal:        Cervical back: He exhibits decreased range of motion and pain  Thoracic back: He exhibits pain  He exhibits normal range of motion  Lumbar back: He exhibits decreased range of motion and pain  Neurological: He is alert and oriented to person, place, and time  He displays tremor (fine, when holding both hands out, fades when at rest)  He displays no atrophy  No cranial nerve deficit or sensory deficit  Coordination and gait normal  GCS eye subscore is 4  GCS verbal subscore is 5  GCS motor subscore is 6  Skin: Skin is warm and dry  Psychiatric: He has a normal mood and affect  His behavior is normal  Judgment and thought content normal    Nursing note and vitals reviewed        Vital Signs  ED Triage Vitals [06/21/18 1759]   Temperature Pulse Respirations Blood Pressure SpO2   98 4 °F (36 9 °C) 70 22 150/88 92 %      Temp Source Heart Rate Source Patient Position - Orthostatic VS BP Location FiO2 (%)   Oral Monitor Sitting Right arm --      Pain Score       8           Vitals:    06/21/18 2150 06/21/18 2215 06/21/18 2300 06/21/18 2330   BP: 114/56 104/52  137/68   Pulse: 90 76 74 88   Patient Position - Orthostatic VS:           Visual Acuity  Visual Acuity      Most Recent Value   L Pupil Size (mm)  2   R Pupil Size (mm)  2          ED Medications  Medications   amLODIPine (NORVASC) tablet 10 mg (not administered)   aspirin (ECOTRIN LOW STRENGTH) EC tablet 81 mg (not administered)   cloNIDine (CATAPRES) tablet 0 1 mg (not administered)   gabapentin (NEURONTIN) capsule 300 mg (not administered)   methadone (DOLOPHINE) tablet 90 mg (not administered)   tamsulosin (FLOMAX) capsule 0 4 mg (not administered)   tiZANidine (ZANAFLEX) tablet 4 mg (not administered)   atorvastatin (LIPITOR) tablet 40 mg (not administered)   chlorhexidine (PERIDEX) 0 12 % oral rinse 15 mL (not administered)   heparin (porcine) subcutaneous injection 5,000 Units (not administered)   acetaminophen (TYLENOL) tablet 650 mg (not administered)   ondansetron (ZOFRAN) injection 4 mg (not administered)   naloxone (NARCAN) 0 04 mg/mL syringe 0 04 mg (0 04 mg Intravenous Given 6/21/18 2320)   sodium chloride 0 9 % bolus 1,000 mL (1,000 mL Intravenous New Bag 6/21/18 2317)   dextrose 5 % and sodium chloride 0 9 % infusion (125 mL/hr Intravenous New Bag 6/21/18 2317)   nicotine (NICODERM CQ) 14 mg/24hr TD 24 hr patch 14 mg (not administered)   insulin lispro (HumaLOG) 100 units/mL subcutaneous injection 1-5 Units (not administered)   ketorolac (TORADOL) injection 15 mg (15 mg Intravenous Given 6/21/18 1851)   sodium chloride 0 9 % bolus 1,000 mL (0 mL Intravenous Stopped 6/21/18 2150)   dextrose 50 % IV solution 50 mL (50 mL Intravenous Given 6/21/18 1953)   insulin regular (HumuLIN R,NovoLIN R) injection 5 Units (5 Units Subcutaneous Given 6/21/18 1951)   naloxone (NARCAN) 0 04 mg/mL syringe 0 04 mg (0 04 mg Intravenous Given 6/21/18 2055)   calcium chloride 10 % injection 1 g (1 g Intravenous Given 6/21/18 2110)   sodium bicarbonate 8 4 % injection 50 mEq (50 mEq Intravenous Given 6/21/18 2115)       Diagnostic Studies  Results Reviewed     Procedure Component Value Units Date/Time    Fingerstick Glucose (POCT) [[id number]]  (Normal) Collected:  06/21/18 2237    Lab Status:  Final result Updated:  06/21/18 2239     POC Glucose 73 mg/dl     Blood gas, arterial [[id number]]  (Abnormal) Collected:  06/21/18 2154    Lab Status:  Final result Specimen:  Blood, Arterial from Radial, Right Updated:  06/21/18 2207     pH, Arterial 7 336 (L)     pCO2, Arterial 42 5 mm Hg      pO2, Arterial 70 8 (L) mm Hg      HCO3, Arterial 22 2 mmol/L      Base Excess, Arterial -3 5 mmol/L      O2 Content, Arterial 15 7 (L) mL/dL      O2 HGB,Arterial  93 4 (L) %      SOURCE Radial, Right     YU TEST Yes     ROOM AIR FIO2 21 %     Basic metabolic panel [[id number]]  (Abnormal) Collected:  06/21/18 2141    Lab Status:  Final result Specimen:  Blood from Arm, Right Updated:  06/21/18 2201     Sodium 139 mmol/L      Potassium 5 4 (H) mmol/L      Chloride 105 mmol/L      CO2 24 mmol/L      Anion Gap 10 mmol/L      BUN 75 (H) mg/dL      Creatinine 6 14 (H) mg/dL      Glucose 63 (L) mg/dL      Calcium 9 9 mg/dL      eGFR 8 ml/min/1 73sq m     Narrative:         National Kidney Disease Education Program recommendations are as follows:  GFR calculation is accurate only with a steady state creatinine  Chronic Kidney disease less than 60 ml/min/1 73 sq  meters  Kidney failure less than 15 ml/min/1 73 sq  meters  Osmolality [[id number]] Collected:  06/21/18 2141    Lab Status:   In process Specimen:  Blood from Arm, Right Updated:  06/21/18 2143    Hepatic function panel [[id number]]  (Normal) Collected:  06/21/18 1847    Lab Status:  Final result Specimen:  Blood from Arm, Left Updated:  06/21/18 2132     Total Bilirubin 0 34 mg/dL      Bilirubin, Direct 0 07 mg/dL      Alkaline Phosphatase 88 U/L      AST 19 U/L      ALT 23 U/L      Total Protein 6 9 g/dL      Albumin 3 8 g/dL     Fingerstick Glucose (POCT) [[id number]]  (Abnormal) Collected:  06/21/18 2050    Lab Status:  Final result Updated:  06/21/18 2056     POC Glucose 142 (H) mg/dl     Urine Microscopic [[id number]]  (Abnormal) Collected:  06/21/18 2018    Lab Status:  Final result Specimen:  Urine from Urine, Clean Catch Updated:  06/21/18 2038     RBC, UA 1-2 (A) /hpf      WBC, UA 10-20 (A) /hpf      Epithelial Cells Occasional /hpf      Bacteria, UA Occasional /hpf      Hyaline Casts, UA 20-30 (A) /lpf      OTHER CRYSTALS Sulfonimide /hpf     Urine culture [[id number]] Collected:  06/21/18 2018    Lab Status: In process Specimen:  Urine from Urine, Clean Catch Updated:  06/21/18 2038    POCT urinalysis dipstick [[id number]]  (Abnormal) Resulted:  06/21/18 2015    Lab Status:  Final result Specimen:  Urine Updated:  06/21/18 2015    ED Urine Macroscopic [[id number]]  (Abnormal) Collected:  06/21/18 2018    Lab Status:  Final result Specimen:  Urine Updated:  06/21/18 2013     Color, UA Yellow     Clarity, UA Clear     pH, UA 5 0     Leukocytes, UA Trace (A)     Nitrite, UA Negative     Protein, UA Trace (A) mg/dl      Glucose, UA Negative mg/dl      Ketones, UA Trace (A) mg/dl      Urobilinogen, UA 0 2 E U /dl      Bilirubin, UA Interference- unable to analyze (A)     Blood, UA Negative     Specific Gravity, UA >=1 030    Narrative:       CLINITEK RESULT    Basic metabolic panel [[id number]]  (Abnormal) Collected:  06/21/18 1847    Lab Status:  Final result Specimen:  Blood from Arm, Left Updated:  06/21/18 1904     Sodium 138 mmol/L      Potassium 5 6 (H) mmol/L      Chloride 101 mmol/L      CO2 26 mmol/L      Anion Gap 11 mmol/L      BUN 71 (H) mg/dL      Creatinine 6 48 (H) mg/dL      Glucose 110 mg/dL      Calcium 8 7 mg/dL      eGFR 8 ml/min/1 73sq m     Narrative:         National Kidney Disease Education Program recommendations are as follows:  GFR calculation is accurate only with a steady state creatinine  Chronic Kidney disease less than 60 ml/min/1 73 sq  meters  Kidney failure less than 15 ml/min/1 73 sq  meters      CBC and differential [[id number]]  (Abnormal) Collected:  06/21/18 1847    Lab Status:  Final result Specimen:  Blood from Arm, Left Updated:  06/21/18 1855     WBC 9 79 Thousand/uL      RBC 3 87 (L) Million/uL      Hemoglobin 11 7 (L) g/dL      Hematocrit 36 7 %      MCV 95 fL      MCH 30 2 pg      MCHC 31 9 g/dL      RDW 15 3 (H) %      MPV 12 8 (H) fL      Platelets 147 (L) Thousands/uL      nRBC 0 /100 WBCs Neutrophils Relative 63 %      Lymphocytes Relative 24 %      Monocytes Relative 9 %      Eosinophils Relative 4 %      Basophils Relative 0 %      Neutrophils Absolute 6 17 Thousands/µL      Lymphocytes Absolute 2 34 Thousands/µL      Monocytes Absolute 0 90 Thousand/µL      Eosinophils Absolute 0 36 Thousand/µL      Basophils Absolute 0 02 Thousands/µL                  CT abdomen pelvis wo contrast   Final Result by Tomás Pacheco MD (06/21 2031)      No acute intra-abdominal abnormality  No free air or free fluid  No hydronephrosis or hydroureter  Moderate amount of stool noted throughout the colon  Workstation performed: [id number]         CT head without contrast   Final Result by Tomás Pacheco MD (06/21 1935)      No acute intracranial abnormality                    Workstation performed: [id number]                    Procedures  ECG 12 Lead Documentation  Date/Time: 6/21/2018 6:12 PM  Performed by: Blayne Hernandez by: Dewayne Reeder     Rate:     ECG rate:  66  Rhythm:     Rhythm: sinus rhythm    T waves:     T waves: non-specific      T waves comment:  Do not appear peaked compared to previous from April 2018    [address] Time  Performed by: Dewayne Reeder  Authorized by: Dewayne Reeder     Critical care provider statement:     Critical care time (minutes):  60    Critical care time was exclusive of:  Separately billable procedures and treating other patients and teaching time    Critical care was necessary to treat or prevent imminent or life-threatening deterioration of the following conditions:  Toxidrome, metabolic crisis and renal failure    Critical care was time spent personally by me on the following activities:  Blood draw for specimens, obtaining history from patient or surrogate, development of treatment plan with patient or surrogate, discussions with consultants, evaluation of patient's response to treatment, examination of patient, interpretation of cardiac output measurements, ordering and performing treatments and interventions, ordering and review of laboratory studies, ordering and review of radiographic studies, re-evaluation of patient's condition and review of old charts    I assumed direction of critical care for this patient from another provider in my specialty: no             Phone Contacts  ED Phone Contact    ED Course  ED Course as of Jun 22 0001   Thu Jun 21, 2018   1920 New onset renal failure of unknown source, last normal in April Creatinine: (!) 6 48   2053 He has had a change in mental status, very drowsy, difficult to arouse, and then seems confused when he is awakened, his respirations are sonorous, , BP 99/56, I see methadone and heroin in the past, so I am trying naloxine, as if he might have taken something    2058 He did respond, and agrees he takes methadone in a clinic, I am concerned he is not clearing it,     2124 Mental status became more agitated, salivating, hypertensive, and threw PVCs, at bedside I gave NaHCO3, and monitored airway, he was able to protect, BP improved  Then he started having diarrhea, which makes opioid withdrawal at least part of the acute change, and supports my reasoning that his first mental status change and sonorous respirations were opioid induced  2151 As he stabilized, and belongings sorted for admission, heroin and paraphernalia were found in his pockets, so at this point I am highly suspicious that he used the opportunity to inject with heroin through his IV  I did speak with Dr Salomon Wakefield with renal, who agrees that is the most likely cause, and uremia less likely, and is having me check some additional labs to further elucidate the renal failure                                    MDM  CritCare Time    Disposition  Final diagnoses:   Acute renal failure (ARF) ([facility] [address] )   Hyperkalemia   Acute delirium - probable opiate ingestion   Opiate withdrawal (HCC)     Time reflects when diagnosis was documented in both MDM as applicable and the Disposition within this note     Time User Action Codes Description Comment    6/21/2018  9:38 PM Gayle Lepe L Add [N17 9] Acute renal failure (ARF) ([address] )     6/21/2018  9:42 PM Gayle Lepe L Add [E87 5] Hyperkalemia     6/21/2018  9:42 PM Francella Durie Add [R41 0] Acute delirium     6/21/2018  9:42 PM Francella Durie Modify [R41 0] Acute delirium probable opiate ingestion    6/21/2018  9:42 PM Francella Durie Add [F11 23] Opiate withdrawal [facility])       ED Disposition     ED Disposition Condition Comment    Admit  Case was discussed with Ashley Hernandez and the patient's admission status was agreed to be Admission Status: inpatient status to the service of Dr Emma Whittington   Follow-up Information    None         Current Discharge Medication List      CONTINUE these medications which have NOT CHANGED    Details   amLODIPine (NORVASC) 10 mg tablet Take 10 mg by mouth daily      aspirin (ECOTRIN LOW STRENGTH) 81 mg EC tablet Take 81 mg by mouth daily      atorvastatin (LIPITOR) 40 mg tablet Take 40 mg by mouth daily      cloNIDine (CATAPRES) 0 2 mg tablet Take 0 5 tablets by mouth 2 (two) times a day  Qty: 6 tablet, Refills: 0      fenofibrate (TRICOR) 145 mg tablet Take 145 mg by mouth daily      gabapentin (NEURONTIN) 300 mg capsule Take 300 mg by mouth daily at bedtime      lisinopril-hydrochlorothiazide (PRINZIDE,ZESTORETIC) 20-25 MG per tablet Take 1 tablet by mouth daily      METHADONE HCL PO Take 90 mg by mouth daily      omeprazole-sodium bicarbonate (ZEGERID)  MG per capsule Take 1 capsule by mouth every morning before breakfast      sitaGLIPtin (JANUVIA) 25 mg tablet Take 25 mg by mouth daily      tamsulosin (FLOMAX) 0 4 mg Take 0 4 mg by mouth daily with dinner      tiZANidine (ZANAFLEX) 4 mg tablet Take 4 mg by mouth daily at bedtime           No discharge procedures on file      ED Provider  Electronically Signed by           Traci Gudino MD  06/22/18 0001

## 2018-06-22 ENCOUNTER — APPOINTMENT (INPATIENT)
Dept: RADIOLOGY | Facility: HOSPITAL | Age: 73
DRG: 070 | End: 2018-06-22
Payer: COMMERCIAL

## 2018-06-22 LAB
ANION GAP SERPL CALCULATED.3IONS-SCNC: 10 MMOL/L (ref 4–13)
ANION GAP SERPL CALCULATED.3IONS-SCNC: 9 MMOL/L (ref 4–13)
APAP SERPL-MCNC: <2 UG/ML (ref 10–30)
ARTERIAL PATENCY WRIST A: YES
ARTERIAL PATENCY WRIST A: YES
ATRIAL RATE: 66 BPM
BACTERIA UR CULT: NORMAL
BASE EXCESS BLDA CALC-SCNC: -5 MMOL/L
BASE EXCESS BLDA CALC-SCNC: -6.5 MMOL/L
BUN SERPL-MCNC: 49 MG/DL (ref 5–25)
BUN SERPL-MCNC: 65 MG/DL (ref 5–25)
CALCIUM SERPL-MCNC: 8.2 MG/DL (ref 8.3–10.1)
CALCIUM SERPL-MCNC: 8.3 MG/DL (ref 8.3–10.1)
CHLORIDE SERPL-SCNC: 109 MMOL/L (ref 100–108)
CHLORIDE SERPL-SCNC: 110 MMOL/L (ref 100–108)
CO2 SERPL-SCNC: 22 MMOL/L (ref 21–32)
CO2 SERPL-SCNC: 26 MMOL/L (ref 21–32)
CREAT SERPL-MCNC: 2.77 MG/DL (ref 0.6–1.3)
CREAT SERPL-MCNC: 4.51 MG/DL (ref 0.6–1.3)
CREAT UR-MCNC: 116 MG/DL
ETHANOL SERPL-MCNC: <3 MG/DL (ref 0–3)
GFR SERPL CREATININE-BSD FRML MDRD: 12 ML/MIN/1.73SQ M
GFR SERPL CREATININE-BSD FRML MDRD: 22 ML/MIN/1.73SQ M
GLUCOSE SERPL-MCNC: 112 MG/DL (ref 65–140)
GLUCOSE SERPL-MCNC: 117 MG/DL (ref 65–140)
GLUCOSE SERPL-MCNC: 86 MG/DL (ref 65–140)
GLUCOSE SERPL-MCNC: 89 MG/DL (ref 65–140)
GLUCOSE SERPL-MCNC: 91 MG/DL (ref 65–140)
GLUCOSE SERPL-MCNC: 93 MG/DL (ref 65–140)
GLUCOSE SERPL-MCNC: 98 MG/DL (ref 65–140)
GLUCOSE SERPL-MCNC: 98 MG/DL (ref 65–140)
GLUCOSE SERPL-MCNC: 99 MG/DL (ref 65–140)
HCO3 BLDA-SCNC: 20.7 MMOL/L (ref 22–28)
HCO3 BLDA-SCNC: 21.9 MMOL/L (ref 22–28)
HOROWITZ INDEX BLDA+IHG-RTO: 50 MM[HG]
MAGNESIUM SERPL-MCNC: 2.3 MG/DL (ref 1.6–2.6)
O2 CT BLDA-SCNC: 14.2 ML/DL (ref 16–23)
O2 CT BLDA-SCNC: 15.4 ML/DL (ref 16–23)
OSMOLALITY UR/SERPL-RTO: 311 MMOL/KG (ref 282–298)
OTHER FIO2: ABNORMAL %
OXYHGB MFR BLDA: 92.1 % (ref 94–97)
OXYHGB MFR BLDA: 96 % (ref 94–97)
P AXIS: 75 DEGREES
PCO2 BLDA: 41 MM HG (ref 36–44)
PCO2 BLDA: 58.2 MM HG (ref 36–44)
PEEP RESPIRATORY: 5 CM[H2O]
PH BLDA: 7.19 [PH] (ref 7.35–7.45)
PH BLDA: 7.32 [PH] (ref 7.35–7.45)
PHOSPHATE SERPL-MCNC: 3.2 MG/DL (ref 2.3–4.1)
PO2 BLDA: 74.1 MM HG (ref 75–129)
PO2 BLDA: 88.9 MM HG (ref 75–129)
POTASSIUM SERPL-SCNC: 4.6 MMOL/L (ref 3.5–5.3)
POTASSIUM SERPL-SCNC: 5.3 MMOL/L (ref 3.5–5.3)
PR INTERVAL: 166 MS
PROT UR-MCNC: 16 MG/DL
PROT/CREAT UR: 0.14 MG/G{CREAT} (ref 0–0.1)
QRS AXIS: 64 DEGREES
QRSD INTERVAL: 92 MS
QT INTERVAL: 380 MS
QTC INTERVAL: 398 MS
SALICYLATES SERPL-MCNC: <3 MG/DL (ref 3–20)
SODIUM SERPL-SCNC: 141 MMOL/L (ref 136–145)
SODIUM SERPL-SCNC: 145 MMOL/L (ref 136–145)
SPECIMEN SOURCE: ABNORMAL
SPECIMEN SOURCE: ABNORMAL
T WAVE AXIS: 62 DEGREES
VENT AC: 16
VENT- AC: AC
VENTRICULAR RATE: 66 BPM
VT SETTING VENT: 450 ML

## 2018-06-22 PROCEDURE — 0BH18EZ INSERTION OF ENDOTRACHEAL AIRWAY INTO TRACHEA, VIA NATURAL OR ARTIFICIAL OPENING ENDOSCOPIC: ICD-10-PCS | Performed by: INTERNAL MEDICINE

## 2018-06-22 PROCEDURE — 83735 ASSAY OF MAGNESIUM: CPT | Performed by: NURSE PRACTITIONER

## 2018-06-22 PROCEDURE — 99222 1ST HOSP IP/OBS MODERATE 55: CPT | Performed by: INTERNAL MEDICINE

## 2018-06-22 PROCEDURE — 5A1935Z RESPIRATORY VENTILATION, LESS THAN 24 CONSECUTIVE HOURS: ICD-10-PCS | Performed by: INTERNAL MEDICINE

## 2018-06-22 PROCEDURE — 84100 ASSAY OF PHOSPHORUS: CPT | Performed by: NURSE PRACTITIONER

## 2018-06-22 PROCEDURE — 94002 VENT MGMT INPAT INIT DAY: CPT

## 2018-06-22 PROCEDURE — 82570 ASSAY OF URINE CREATININE: CPT | Performed by: INTERNAL MEDICINE

## 2018-06-22 PROCEDURE — 99223 1ST HOSP IP/OBS HIGH 75: CPT | Performed by: INTERNAL MEDICINE

## 2018-06-22 PROCEDURE — 80329 ANALGESICS NON-OPIOID 1 OR 2: CPT | Performed by: NURSE PRACTITIONER

## 2018-06-22 PROCEDURE — 36600 WITHDRAWAL OF ARTERIAL BLOOD: CPT

## 2018-06-22 PROCEDURE — 82805 BLOOD GASES W/O2 SATURATION: CPT | Performed by: NURSE PRACTITIONER

## 2018-06-22 PROCEDURE — 84156 ASSAY OF PROTEIN URINE: CPT | Performed by: INTERNAL MEDICINE

## 2018-06-22 PROCEDURE — 82948 REAGENT STRIP/BLOOD GLUCOSE: CPT

## 2018-06-22 PROCEDURE — 94660 CPAP INITIATION&MGMT: CPT

## 2018-06-22 PROCEDURE — 71045 X-RAY EXAM CHEST 1 VIEW: CPT

## 2018-06-22 PROCEDURE — 87205 SMEAR GRAM STAIN: CPT | Performed by: INTERNAL MEDICINE

## 2018-06-22 PROCEDURE — 31500 INSERT EMERGENCY AIRWAY: CPT | Performed by: NURSE PRACTITIONER

## 2018-06-22 PROCEDURE — 93010 ELECTROCARDIOGRAM REPORT: CPT | Performed by: INTERNAL MEDICINE

## 2018-06-22 PROCEDURE — 80320 DRUG SCREEN QUANTALCOHOLS: CPT | Performed by: NURSE PRACTITIONER

## 2018-06-22 PROCEDURE — 80048 BASIC METABOLIC PNL TOTAL CA: CPT | Performed by: NURSE PRACTITIONER

## 2018-06-22 RX ORDER — FUROSEMIDE 10 MG/ML
80 INJECTION INTRAMUSCULAR; INTRAVENOUS ONCE
Status: DISCONTINUED | OUTPATIENT
Start: 2018-06-22 | End: 2018-06-22

## 2018-06-22 RX ORDER — LORAZEPAM 2 MG/ML
0.5 INJECTION INTRAMUSCULAR ONCE
Status: COMPLETED | OUTPATIENT
Start: 2018-06-22 | End: 2018-06-22

## 2018-06-22 RX ORDER — VECURONIUM BROMIDE 1 MG/ML
10 INJECTION, POWDER, LYOPHILIZED, FOR SOLUTION INTRAVENOUS ONCE
Status: COMPLETED | OUTPATIENT
Start: 2018-06-22 | End: 2018-06-22

## 2018-06-22 RX ORDER — CHLORHEXIDINE GLUCONATE 0.12 MG/ML
15 RINSE ORAL EVERY 12 HOURS SCHEDULED
Status: DISCONTINUED | OUTPATIENT
Start: 2018-06-22 | End: 2018-06-25

## 2018-06-22 RX ORDER — METHADONE HYDROCHLORIDE 10 MG/ML
90 CONCENTRATE ORAL DAILY
Status: DISCONTINUED | OUTPATIENT
Start: 2018-06-22 | End: 2018-06-22

## 2018-06-22 RX ORDER — FENTANYL CITRATE 50 UG/ML
INJECTION, SOLUTION INTRAMUSCULAR; INTRAVENOUS
Status: COMPLETED
Start: 2018-06-22 | End: 2018-06-22

## 2018-06-22 RX ORDER — PROPOFOL 10 MG/ML
5-50 INJECTION, EMULSION INTRAVENOUS
Status: DISCONTINUED | OUTPATIENT
Start: 2018-06-22 | End: 2018-06-23

## 2018-06-22 RX ORDER — ETOMIDATE 2 MG/ML
40 INJECTION INTRAVENOUS ONCE
Status: COMPLETED | OUTPATIENT
Start: 2018-06-22 | End: 2018-06-22

## 2018-06-22 RX ORDER — PROPOFOL 10 MG/ML
INJECTION, EMULSION INTRAVENOUS
Status: COMPLETED
Start: 2018-06-22 | End: 2018-06-22

## 2018-06-22 RX ORDER — METHADONE HYDROCHLORIDE 10 MG/ML
90 CONCENTRATE ORAL DAILY
Status: DISCONTINUED | OUTPATIENT
Start: 2018-06-23 | End: 2018-06-26 | Stop reason: HOSPADM

## 2018-06-22 RX ORDER — FENTANYL CITRATE 50 UG/ML
50 INJECTION, SOLUTION INTRAMUSCULAR; INTRAVENOUS EVERY 2 HOUR PRN
Status: DISCONTINUED | OUTPATIENT
Start: 2018-06-22 | End: 2018-06-23

## 2018-06-22 RX ADMIN — DEXTROSE AND SODIUM CHLORIDE 125 ML/HR: 5; .9 INJECTION, SOLUTION INTRAVENOUS at 08:19

## 2018-06-22 RX ADMIN — METHADONE HYDROCHLORIDE 90 MG: 10 CONCENTRATE ORAL at 08:13

## 2018-06-22 RX ADMIN — SODIUM CHLORIDE, PRESERVATIVE FREE 0.04 MG: 5 INJECTION INTRAVENOUS at 02:58

## 2018-06-22 RX ADMIN — VECURONIUM BROMIDE 10 MG: 10 INJECTION, POWDER, LYOPHILIZED, FOR SOLUTION INTRAVENOUS at 03:54

## 2018-06-22 RX ADMIN — DEXMEDETOMIDINE HYDROCHLORIDE 0.2 MCG/KG/HR: 100 INJECTION, SOLUTION INTRAVENOUS at 01:17

## 2018-06-22 RX ADMIN — HEPARIN SODIUM 5000 UNITS: 5000 INJECTION, SOLUTION INTRAVENOUS; SUBCUTANEOUS at 23:12

## 2018-06-22 RX ADMIN — SODIUM CHLORIDE, PRESERVATIVE FREE 0.04 MG: 5 INJECTION INTRAVENOUS at 00:15

## 2018-06-22 RX ADMIN — FENTANYL CITRATE 50 MCG: 50 INJECTION INTRAMUSCULAR; INTRAVENOUS at 03:54

## 2018-06-22 RX ADMIN — Medication 80 MCG: at 01:00

## 2018-06-22 RX ADMIN — CHLORHEXIDINE GLUCONATE 15 ML: 1.2 RINSE ORAL at 23:12

## 2018-06-22 RX ADMIN — PROPOFOL 25 MCG/KG/MIN: 10 INJECTION, EMULSION INTRAVENOUS at 04:10

## 2018-06-22 RX ADMIN — DEXTROSE AND SODIUM CHLORIDE 125 ML/HR: 5; .9 INJECTION, SOLUTION INTRAVENOUS at 16:31

## 2018-06-22 RX ADMIN — LORAZEPAM 0.5 MG: 2 INJECTION INTRAMUSCULAR; INTRAVENOUS at 01:19

## 2018-06-22 RX ADMIN — HEPARIN SODIUM 5000 UNITS: 5000 INJECTION, SOLUTION INTRAVENOUS; SUBCUTANEOUS at 13:46

## 2018-06-22 RX ADMIN — LORAZEPAM 0.5 MG: 2 INJECTION INTRAMUSCULAR; INTRAVENOUS at 00:30

## 2018-06-22 RX ADMIN — TAMSULOSIN HYDROCHLORIDE 0.4 MG: 0.4 CAPSULE ORAL at 17:45

## 2018-06-22 RX ADMIN — CEFEPIME HYDROCHLORIDE 1000 MG: 1 INJECTION, SOLUTION INTRAVENOUS at 18:59

## 2018-06-22 RX ADMIN — ATORVASTATIN CALCIUM 40 MG: 40 TABLET, FILM COATED ORAL at 17:45

## 2018-06-22 RX ADMIN — CHLORHEXIDINE GLUCONATE 15 ML: 1.2 RINSE ORAL at 08:13

## 2018-06-22 RX ADMIN — HEPARIN SODIUM 5000 UNITS: 5000 INJECTION, SOLUTION INTRAVENOUS; SUBCUTANEOUS at 06:04

## 2018-06-22 RX ADMIN — ETOMIDATE 40 MG: 2 INJECTION, SOLUTION INTRAVENOUS at 04:02

## 2018-06-22 RX ADMIN — NICOTINE 14 MG: 14 PATCH TRANSDERMAL at 08:13

## 2018-06-22 RX ADMIN — FENTANYL CITRATE 50 MCG: 50 INJECTION INTRAMUSCULAR; INTRAVENOUS at 06:27

## 2018-06-22 RX ADMIN — CLONIDINE HYDROCHLORIDE 0.1 MG: 0.1 TABLET ORAL at 17:45

## 2018-06-22 NOTE — PROGRESS NOTES
Patient was extubated at this time following a sedation break and wean  Patient was placed on 4 liters oxygen via nasal cannula  Patient's family present and helpful in keeping the patient calm and more comfortable

## 2018-06-22 NOTE — ED NOTES
Harvinder Vázquez at [facility], [address] [facility]  06/21/18 0693

## 2018-06-22 NOTE — PROCEDURES
Intubation  Date/Time: 6/22/2018 3:15 AM  Performed by: Blanco Kent  Authorized by: Blanco Kent     Patient location:  Bedside  Consent:     Consent obtained:  Emergent situation  Universal protocol:     Site/side marked: yes      Immediately prior to procedure, a time out was called: yes      Patient identity confirmed:  Hospital-assigned identification number and arm band  Pre-procedure details:     Patient status:  Altered mental status    Mallampati score:  3    Pretreatment medications:  Etomidate    Paralytics:  None  Indications:     Indications for intubation: respiratory failure and hypercapnia    Procedure details:     Preoxygenation:  Bag valve mask    CPR in progress: no      Intubation method:  Oral    Oral intubation technique:  Direct    Laryngoscope blade: Mac 3    Tube size (mm):  8 0    Tube type:  Cuffed    Number of attempts:  1    Cricoid pressure: yes      Tube visualized through cords: yes    Placement assessment:     ETT to lip:  26    Tube secured with:  ETT dominguez    Breath sounds:  Equal and absent over the epigastrium    Placement verification: chest rise, CXR verification, equal breath sounds and ETCO2 detector      CXR findings:  ETT in right main stem    Tube repositioned: yes    Post-procedure details:     Patient tolerance of procedure:   Tolerated well, no immediate complications

## 2018-06-22 NOTE — NURSING NOTE
2215 Pt to ICU from ED for [facility],  suspected illicit drug use prior to ICU admission  Upon arrival to ICU pt lethargic, but easily aroused  Pt lethargy continued deepening, given   4 narcan x2  Suspected detoxing from known methadone treatments; pt became increasingly combative and irritable  Ativan   5mg x2 given, bolus of precedex given and precedex gtt started per CRNP  CRNP continually at bedside and aware of pt condition  Pt HR sinus to sinus tach with occasional PVC's, respiratory status being continually monitored  Coma panel sent, and will continue to trend lytes per order  Will continue to monitor pt condition

## 2018-06-22 NOTE — H&P
History & Physical Exam - Critical Care   Althea Luis 68 y o  male MRN: [id number]  Unit/Bed#: ED 21 Encounter: [id number]      Assessment/Plan:  1  Acute renal failure  · Admit to inpatient status in the ICU for close monitoring as his condition is expected to require greater than a 2 midnight stay  · Formal nephrology consult is pending  · Creatinine improved on repeat BMP after initial fluid resuscitation  Give an additional 1L of NS then start on D5NS at 125mL/hr for continued IV hydration  Trend renal indices and monitor intake and output  2  Hx of opioid use on methadone with suspicion of recent heroin use  · Narcan PRN for decreased mental status or respiratory depression  · Monitor for signs of withdrawal   Ativan prn  · Ok to restart methadone per home dosing tomorrow  3  DM Type II  · Has had some relative hypoglycemia since arrival   Add dextrose in maintenance  · Hold oral antihyperglycemics given MICH  Start Accuchecks with SSI coverage with goal to maintain -180  4  Essential HTN  · Continue norvasc and clonidine per home regimen  Hold lisinopril given MICH  5  Hyperkalemia  · Pt given Insulin, dextrose, calcium and bicarbonate in the ED  Potassium improved on repeat BMP  · No ECG changes  Monitor telemetry  _____________________________________________________________________      HPI:    Althea Luis is a 68 y o  male with a history of hypertension, DM type 2, previous opioid abuse on methadone and chronic back pain who presents with a chief complaint of weakness  Patient presented to the ED this evening complaining of weakness in his bilateral upper extremities which was causing him to drop things with more frequency  On initial presentation to the ED he was alert and oriented  General workup revealed a potassium of 5 4, BUn of 75 and creatinine of 6 14  Other labs were largely unremarkable  Head and abdominal CT were negative for acute abnormality    While in the ED the patient had an acute deterioration in his mental status with lethargy and difficulty arousing  Due to his history of opioid use he was given IV narcan with immediate improvement in his mental status  Patient was also found with heroin in his possession  Therefore there was concern for use in the department or immediately prior to presentation  Of note he is on chronic methadone  At the time of my exam the patient appeared listless however he was able to answer my questions appropriately  He denied any recent illness, fever, cough, chest pain, abdominal pain, nausea, vomiting, diarrhea, flank pain, or dysuria  He complained of upper and lower back pain consistent with his chronic pain  Review of Systems:  Review of Systems   Constitutional: Negative for chills and fever  HENT: Negative for sinus pressure and sore throat  Eyes: Negative for visual disturbance  Respiratory: Negative for cough, chest tightness and shortness of breath  Cardiovascular: Negative for chest pain, palpitations and leg swelling  Gastrointestinal: Negative for abdominal pain, diarrhea, nausea and vomiting  Endocrine: Negative  Genitourinary: Negative for dysuria, flank pain and hematuria  Musculoskeletal: Positive for back pain (chronic upper and lower)  Negative for myalgias and neck pain  Skin: Negative for rash and wound  Allergic/Immunologic: Negative  Neurological: Positive for weakness  Negative for seizures, facial asymmetry, speech difficulty and headaches  Hematological: Negative  Psychiatric/Behavioral: Negative  Full 14 point review of systems was performed  Aside from what was mentioned in the HPI, it is otherwise negative  Historical Information   Past Medical History:   Diagnosis Date    Diabetes mellitus ([address] )     Heroin abuse     "20 years" snorting heroin; last use 1 week ago   Hyperlipidemia     Hypertension      History reviewed   No pertinent surgical history  Social History   History   Alcohol Use No     History   Drug Use No     History   Smoking Status    Current Some Day Smoker    Packs/day: 0 25   Smokeless Tobacco    Never Used       Family History:   History reviewed  No pertinent family history  Medications:  Pertinent medications were reviewed        No Known Allergies      Vitals:   /58 (BP Location: Right arm)   Pulse 88   Temp 98 4 °F (36 9 °C) (Oral)   Resp 22   Wt 81 6 kg (180 lb)   SpO2 96%   BMI 29 95 kg/m²   Body mass index is 29 95 kg/m²  SpO2: 96 %,   SpO2 Activity: At Rest,   O2 Device: None (Room air)      Intake/Output Summary (Last 24 hours) at 06/21/18 2208  Last data filed at 06/21/18 2150   Gross per 24 hour   Intake             1000 ml   Output                0 ml   Net             1000 ml     Invasive Devices     Peripheral Intravenous Line            Peripheral IV 06/21/18 Left Antecubital less than 1 day    Peripheral IV 06/21/18 Left Hand less than 1 day    Peripheral IV 06/21/18 Right Antecubital less than 1 day                Physical Exam:  Gen:Awake, well developed, agitated, does not appear in acute distress  HE:  Atraumatic, normocephalic, extraocular movements intact, pupils 2 mm equal and sluggishly reactive  ENT:  Oropharynx moist without erythema or exudate  Neck/lymph:  Supple, trachea midline, no JVD, no lymphadenopathy  Chest:  Clear to auscultation bilaterally, no wheeze, rales, rhonchi  Cor:  Single S1/S2, no murmurs, rubs, gallops, regular rate and rhythm  Abd:  Soft, nontender, nondistended, no hepatosplenomegaly, bowel sounds normoactive  Ext:  No edema, no clubbing or cyanosis  Neuro:  Oriented to person and place, slightly confused as to time, moves all extremities, follows commands, cranial nerves 2-12 grossly intact, no focal deficits  Skin:  Warm, dry      Diagnostic Data:  Lab: I have personally reviewed pertinent lab results  ,   CBC:    Results from last 7 days  Lab Units 06/21/18  1402 WBC Thousand/uL 9 79   HEMOGLOBIN g/dL 11 7*   HEMATOCRIT % 36 7   PLATELETS Thousands/uL 146*      CMP: Lab Results   Component Value Date     06/21/2018    K 5 4 (H) 06/21/2018     06/21/2018    CO2 24 06/21/2018    ANIONGAP 10 06/21/2018    BUN 75 (H) 06/21/2018    CREATININE 6 14 (H) 06/21/2018    GLUCOSE 63 (L) 06/21/2018    CALCIUM 9 9 06/21/2018    AST 19 06/21/2018    ALT 23 06/21/2018    ALKPHOS 88 06/21/2018    PROT 6 9 06/21/2018    BILITOT 0 34 06/21/2018    EGFR 8 06/21/2018   ,   PT/INR: No results found for: PT, INR,   Troponin: No results found for: TROPONINI,   Magnesium: No results found for: MAG,  Phosphorous: No results found for: PHOS    ABG: Lab Results   Component Value Date    PHART 7 336 (L) 06/21/2018    MDX8JXG 42 5 06/21/2018    PO2ART 70 8 (L) 06/21/2018    QZR0XTS 22 2 06/21/2018    BEART -3 5 06/21/2018    SOURCE Radial, Right 06/21/2018   ,       Imaging: I have personally reviewed the pertinent imaging studies on the PACS system  CT abdomen pelvis wo contrast   Final Result      No acute intra-abdominal abnormality  No free air or free fluid  No hydronephrosis or hydroureter  Moderate amount of stool noted throughout the colon  Workstation performed: [id number]         CT head without contrast   Final Result      No acute intracranial abnormality  Workstation performed: [id number]               EKG/telemetry/Echo:   NSR on ECG      VTE Prophylaxis: Sequential compression device (Venodyne)  and Heparin    Code Status: Prior    Portions of the record may have been created with voice recognition software  Occasional wrong word or "sound a like" substitutions may have occurred due to the inherent limitations of voice recognition software  Read the chart carefully and recognize, using context, where substitutions have occurred

## 2018-06-22 NOTE — TREATMENT PLAN
Called from ER to review case as patient presented oriented, but had change in mental state during ER stay  Cr was 6 48 mg/dL and K 5 6, BUN 71  There was question of change in mental state  BP had decreased to 90s then increased in setting of narcan  Upon further evaluation, there was a question of use of illicit drug while in the ER by the patient with heroin  UA was reviewed which showed WBC, but minimal RBC in urine  Many hyaline casts  Concentrated specimen  sulfonimide crystals in urine    Pt is receiving IVF  CT imaging without hydro  Unclear if patient had sulfa drug prior to admission  Check stat BMP repeat - which shows slightly improved Cr, improved K       Check serum osm to calculate osm gap - pending    Plan was reviewed with Primary Team Attending

## 2018-06-22 NOTE — CASE MANAGEMENT
Initial Clinical Review    Admission: Date/Time/Statement: 6/21/18 @ 2143 Inpatient Written     Orders Placed This Encounter   Procedures    Inpatient Admission (expected length of stay for this patient is greater than two midnights)     Standing Status:   Standing     Number of Occurrences:   1     Order Specific Question:   Admitting Physician     Answer:   Angela Leventhal [[id number]]     Order Specific Question:   Level of Care     Answer:   Level 1 Stepdown [13]     Order Specific Question:   Estimated length of stay     Answer:   More than 2 Midnights     Order Specific Question:   Certification     Answer:   I certify that inpatient services are medically necessary for this patient for a duration of greater than two midnights  See H&P and MD Progress Notes for additional information about the patient's course of treatment  ED: Date/Time/Mode of Arrival:   ED Arrival Information     Expected Arrival Acuity Means of Arrival Escorted By Service Admission Type    - 6/21/2018 17:48 Emergent Walk-In Self Critical Care/ICU Emergency    Arrival Complaint    Shortness Of Breath          Chief Complaint:   Chief Complaint   Patient presents with    Back Pain     Patient reports back pain that started 3-4 days ago  Patient reports pain radiating into his legs "sometimes " Patient denies bowel or bladder dysfunction   Weakness - Generalized     Patient reports that he has a new tremor and weakness  Patient states "everything I  I have dropped for the last 2 days " Patient alert to person, place, and sitatuion in triage  History of Illness: 67 yo male c/o back pain he describes at several points, mostly bilateral low back, neck, mid back, without radiation or paresthesias, denies injury or fall  He also c/o shaking tremor and "dropping things' with both hands since yesterday       PER ADMITTING H&P:  HPI:    Mikki Holden is a 68 y o  male with a history of hypertension, DM type 2, previous opioid abuse on methadone and chronic back pain who presents with a chief complaint of weakness  Patient presented to the ED this evening complaining of weakness in his bilateral upper extremities which was causing him to drop things with more frequency  On initial presentation to the ED he was alert and oriented  General workup revealed a potassium of 5 4, BUn of 75 and creatinine of 6 14  Other labs were largely unremarkable  Head and abdominal CT were negative for acute abnormality  While in the ED the patient had an acute deterioration in his mental status with lethargy and difficulty arousing  Due to his history of opioid use he was given IV narcan with immediate improvement in his mental status  Patient was also found with heroin in his possession  Therefore there was concern for use in the department or immediately prior to presentation  Of note he is on chronic methadone  ED Vital Signs:   ED Triage Vitals [06/21/18 1759]   Temperature Pulse Respirations Blood Pressure SpO2   98 4 °F (36 9 °C) 70 22 150/88 92 %      Temp Source Heart Rate Source Patient Position - Orthostatic VS BP Location FiO2 (%)   Oral Monitor Sitting Right arm --      Pain Score       8        Wt Readings from Last 1 Encounters:   06/21/18 81 6 kg (179 lb 14 3 oz)       Vital Signs (abnormal): temp 97 1 HR 48, 52  resps 10-39  O2 sat 89% on RA    Abnormal Labs/Diagnostic Test Results:   HEMOGLOBIN 11 7*   PLATELETS 893*     K 5 4 (H)   BUN 75 (H)   CREATININE 6 14 (H)   GLUCOSE 63 (L)     PHART 7 336 (L)   PO2ART 70 8 (L)   SOURCE Radial, Right     Osmolality Serum 311         Leukocytes, UA Trace     Protein, UA Trace     Ketones, UA Trace     Bilirubin, UA Interference- unable to analyze       RBC, UA 1-2     WBC, UA 10-20     Hyaline Casts, UA 20-30       CT Abd, pelvis:    No acute intra-abdominal abnormality  No free air or free fluid  No hydronephrosis or hydroureter     Moderate amount of stool noted throughout the colon    CT Head:  WNL  EKG:  NSR  CXR:  Pulmonary vascular congestion  ED Treatment:   Medication Administration from 06/21/2018 1748 to 06/21/2018 2241       Date/Time Order Dose Route Action     06/21/2018 1851 ketorolac (TORADOL) injection 15 mg 15 mg Intravenous Given     06/21/2018 1950 sodium chloride 0 9 % bolus 1,000 mL 1,000 mL Intravenous New Bag     06/21/2018 1953 dextrose 50 % IV solution 50 mL 50 mL Intravenous Given     06/21/2018 1951 insulin regular (HumuLIN R,NovoLIN R) injection 5 Units 5 Units Subcutaneous Given     06/21/2018 2055 naloxone (NARCAN) 0 04 mg/mL syringe 0 04 mg 0 04 mg Intravenous Given     06/21/2018 2110 calcium chloride 10 % injection 1 g 1 g Intravenous Given     06/21/2018 2115 sodium bicarbonate 8 4 % injection 50 mEq 50 mEq Intravenous Given          Past Medical/Surgical History: Active Ambulatory Problems     Diagnosis Date Noted    Chest pain 03/14/2018    HTN (hypertension) 03/14/2018    HLD (hyperlipidemia) 03/14/2018    [facility] [address] [facility] 03/14/2018    [facility]one use ([facility] ) 03/14/2018    Renal disease 03/14/2018    Tobacco abuse counseling 03/16/2018     Resolved Ambulatory Problems     Diagnosis Date Noted    No Resolved Ambulatory Problems     Past Medical History:   Diagnosis Date    Asthma     Diabetes mellitus ([facility] )     Heroin abuse     Hyperlipidemia     Hypertension        Admitting Diagnosis: Hyperkalemia [E87 5]  Acute delirium [R41 0]  Back pain [M54 9]  Weakness [R53 1]  Opiate withdrawal (HCC) [F11 23]  Acute renal failure (ARF) (HCC) [N17 9]    Age/Sex: 68 y o  male    Assessment/Plan:  1  Acute renal failure  · Admit to inpatient status in the ICU for close monitoring as his condition is expected to require greater than a 2 midnight stay  · Formal nephrology consult is pending  · Creatinine improved on repeat BMP after initial fluid resuscitation    Give an additional 1L of NS then start on D5NS at 125mL/hr for continued IV hydration  Trend renal indices and monitor intake and output  2  Hx of opioid use on methadone with suspicion of recent heroin use  · Narcan PRN for decreased mental status or respiratory depression  · Monitor for signs of withdrawal   Ativan prn  · Ok to restart methadone per home dosing tomorrow  3  DM Type II  · Has had some relative hypoglycemia since arrival   Add dextrose in maintenance  · Hold oral antihyperglycemics given MICH  Start Accuchecks with SSI coverage with goal to maintain -180  4  Essential HTN  · Continue norvasc and clonidine per home regimen  Hold lisinopril given MICH  5  Hyperkalemia  · Pt given Insulin, dextrose, calcium and bicarbonate in the ED  Potassium improved on repeat BMP  · No ECG changes    Monitor telemetry      VTE Prophylaxis: Sequential compression device (Venodyne)  and Heparin    Admission Orders:  ICU  NPO  Accuchecks QAC and QHS with coverage  I/O  Intubation with mechanical ventilation  Consult cm  Sequential compression device    Scheduled Meds:   Current Facility-Administered Medications:  acetaminophen 650 mg Oral Q6H PRN   amLODIPine 10 mg Oral Daily   aspirin 81 mg Oral Daily   atorvastatin 40 mg Oral After Dinner   chlorhexidine 15 mL Swish & Spit Q12H [address]   chlorhexidine 15 mL Swish & Spit Q12H [address]   cloNIDine 0 1 mg Oral BID   dexmedetomidine 0 1-0 7 mcg/kg/hr Intravenous Titrated   dextrose 5 % and sodium chloride 0 9 % 125 mL/hr Intravenous Continuous   fentanyl citrate (PF) 50 mcg Intravenous Q2H PRN   gabapentin 300 mg Oral HS   heparin (porcine) 5,000 Units Subcutaneous Q8H [address]   insulin lispro 1-5 Units Subcutaneous Q6H [address]   methadone 90 mg Oral Daily   naloxone 0 04 mg Intravenous Q1MIN PRN   nicotine 14 mg Transdermal Daily   ondansetron 4 mg Intravenous Q4H PRN   propofol 5-50 mcg/kg/min Intravenous Titrated   tamsulosin 0 4 mg Oral Daily With Dinner   tiZANidine 4 mg Oral HS     Continuous Infusions:   dexmedetomidine 0 1-0 7 mcg/kg/hr Last Rate: Stopped (06/22/18 0330)   dextrose 5 % and sodium chloride 0 9 % 125 mL/hr Last Rate: 125 mL/hr (06/22/18 0819)   propofol 5-50 mcg/kg/min Last Rate: 30 mcg/kg/min (06/22/18 0820)     PRN Meds:   acetaminophen    fentanyl citrate (PF) 50 mcg IV x2 thus far    Naloxone 0 04mg IV x3 thus far    ondansetron    Thank you,  [facility] in the [facility] [address] for 2017  Network Utilization Review Department  Phone: [phone]; Fax [phone]  ATTENTION: The Network Utilization Review Department is now centralized for our 7 Facilities  Make a note that we have a new phone and fax numbers for our Department  Please call with any questions or concerns to [phone] and carefully follow the prompts so that you are directed to the right person  All voicemails are confidential  Fax any determinations, approvals, denials, and requests for initial or continue stay review clinical to [phone]  Due to HIGH CALL volume, it would be easier if you could please send faxed requests to expedite your requests and in part, help us provide discharge notifications faster

## 2018-06-22 NOTE — ED NOTES
Contacted [employer] due to finding 2 bags of heroin on patient  Talked to Dispatcher #[id number]       Marian Hannah  06/21/18 6026

## 2018-06-22 NOTE — ED NOTES
Pt given second liter NSS bolus per [facility] Brianna May RN  06/21/18 7703

## 2018-06-22 NOTE — ED NOTES
Pt more difficult to arouse from sleep by Dr Jose A Pierre at bedside for evaluation        Edie Soulier, RN  06/21/18 2061

## 2018-06-22 NOTE — ED NOTES
During pt's time in ED became increasingly sleepy  Pt easy to arouse  Pt found by Dr Giovana Burnette extremely difficult to arouse  Pt's pupils pinpoint and and respiratory rate decreased with audible snoring  Pt given narcan  Pt became more awake and alert with narcan and normal respirations returned  Pt became increasingly agitated, started spitting all over himself and was throwing frequent PVC's on the monitor with a BP of 770 systolic at 50:57  Dr Giovana Burnette made aware at 21:00  Code cart hooked up to pt at 21:05  1 amp of Calcium Chloride given at 21:10 and 1 amp of BiCarb given at 21:15  Mason Dinh RN found 2 bags of Heroin in pt's pant pockets while changing patient out of pants  Pt denies heroin use and states, " the bags are not his " [employer] called and at bedside       Lucina Heart, SILVER  06/21/18 [address] Chana StagerSILVER  06/21/18 6905

## 2018-06-22 NOTE — NURSING NOTE
Pt intubated for airway protection, observed to be obtunded by bedside nurse and ABG revealed rise in pt PCO2  RT and CRNP at bedside, see MAR for meds given  Pt's daughter updated on pt condition, she stated she will be in to see the pt this morning  Will continue to closely monitor pt

## 2018-06-23 LAB
ANION GAP SERPL CALCULATED.3IONS-SCNC: 4 MMOL/L (ref 4–13)
BASOPHILS # BLD AUTO: 0.02 THOUSANDS/ΜL (ref 0–0.1)
BASOPHILS NFR BLD AUTO: 0 % (ref 0–1)
BUN SERPL-MCNC: 28 MG/DL (ref 5–25)
CALCIUM SERPL-MCNC: 8.9 MG/DL (ref 8.3–10.1)
CHLORIDE SERPL-SCNC: 112 MMOL/L (ref 100–108)
CO2 SERPL-SCNC: 28 MMOL/L (ref 21–32)
CREAT SERPL-MCNC: 1.45 MG/DL (ref 0.6–1.3)
EOSINOPHIL # BLD AUTO: 0.25 THOUSAND/ΜL (ref 0–0.61)
EOSINOPHIL NFR BLD AUTO: 3 % (ref 0–6)
EOSINOPHIL NFR URNS MANUAL: 0 %
ERYTHROCYTE [DISTWIDTH] IN BLOOD BY AUTOMATED COUNT: 15.2 % (ref 11.6–15.1)
GFR SERPL CREATININE-BSD FRML MDRD: 48 ML/MIN/1.73SQ M
GLUCOSE SERPL-MCNC: 120 MG/DL (ref 65–140)
GLUCOSE SERPL-MCNC: 147 MG/DL (ref 65–140)
GLUCOSE SERPL-MCNC: 87 MG/DL (ref 65–140)
GLUCOSE SERPL-MCNC: 96 MG/DL (ref 65–140)
HCT VFR BLD AUTO: 36 % (ref 36.5–49.3)
HGB BLD-MCNC: 11.4 G/DL (ref 12–17)
LYMPHOCYTES # BLD AUTO: 1.77 THOUSANDS/ΜL (ref 0.6–4.47)
LYMPHOCYTES NFR BLD AUTO: 23 % (ref 14–44)
MCH RBC QN AUTO: 30.2 PG (ref 26.8–34.3)
MCHC RBC AUTO-ENTMCNC: 31.7 G/DL (ref 31.4–37.4)
MCV RBC AUTO: 95 FL (ref 82–98)
MONOCYTES # BLD AUTO: 0.64 THOUSAND/ΜL (ref 0.17–1.22)
MONOCYTES NFR BLD AUTO: 8 % (ref 4–12)
NEUTROPHILS # BLD AUTO: 5.19 THOUSANDS/ΜL (ref 1.85–7.62)
NEUTS SEG NFR BLD AUTO: 66 % (ref 43–75)
NRBC BLD AUTO-RTO: 0 /100 WBCS
PLATELET # BLD AUTO: 117 THOUSANDS/UL (ref 149–390)
PMV BLD AUTO: 12.8 FL (ref 8.9–12.7)
POTASSIUM SERPL-SCNC: 4.3 MMOL/L (ref 3.5–5.3)
RBC # BLD AUTO: 3.78 MILLION/UL (ref 3.88–5.62)
SODIUM SERPL-SCNC: 144 MMOL/L (ref 136–145)
WBC # BLD AUTO: 7.87 THOUSAND/UL (ref 4.31–10.16)

## 2018-06-23 PROCEDURE — 82948 REAGENT STRIP/BLOOD GLUCOSE: CPT

## 2018-06-23 PROCEDURE — 99232 SBSQ HOSP IP/OBS MODERATE 35: CPT | Performed by: INTERNAL MEDICINE

## 2018-06-23 PROCEDURE — 85025 COMPLETE CBC W/AUTO DIFF WBC: CPT | Performed by: NURSE PRACTITIONER

## 2018-06-23 PROCEDURE — 99233 SBSQ HOSP IP/OBS HIGH 50: CPT | Performed by: INTERNAL MEDICINE

## 2018-06-23 PROCEDURE — 80048 BASIC METABOLIC PNL TOTAL CA: CPT | Performed by: NURSE PRACTITIONER

## 2018-06-23 RX ORDER — HYDRALAZINE HYDROCHLORIDE 20 MG/ML
10 INJECTION INTRAMUSCULAR; INTRAVENOUS EVERY 6 HOURS PRN
Status: DISCONTINUED | OUTPATIENT
Start: 2018-06-23 | End: 2018-06-26 | Stop reason: HOSPADM

## 2018-06-23 RX ORDER — ALBUTEROL SULFATE 90 UG/1
2 AEROSOL, METERED RESPIRATORY (INHALATION) EVERY 4 HOURS PRN
Status: DISCONTINUED | OUTPATIENT
Start: 2018-06-23 | End: 2018-06-26 | Stop reason: HOSPADM

## 2018-06-23 RX ADMIN — ALBUTEROL SULFATE 2 PUFF: 90 AEROSOL, METERED RESPIRATORY (INHALATION) at 12:15

## 2018-06-23 RX ADMIN — TAMSULOSIN HYDROCHLORIDE 0.4 MG: 0.4 CAPSULE ORAL at 17:30

## 2018-06-23 RX ADMIN — HEPARIN SODIUM 5000 UNITS: 5000 INJECTION, SOLUTION INTRAVENOUS; SUBCUTANEOUS at 22:55

## 2018-06-23 RX ADMIN — HYDRALAZINE HYDROCHLORIDE 10 MG: 20 INJECTION INTRAMUSCULAR; INTRAVENOUS at 10:27

## 2018-06-23 RX ADMIN — HYDRALAZINE HYDROCHLORIDE 10 MG: 20 INJECTION INTRAMUSCULAR; INTRAVENOUS at 23:20

## 2018-06-23 RX ADMIN — ATORVASTATIN CALCIUM 40 MG: 40 TABLET, FILM COATED ORAL at 17:30

## 2018-06-23 RX ADMIN — METHADONE HYDROCHLORIDE 90 MG: 10 CONCENTRATE ORAL at 08:36

## 2018-06-23 RX ADMIN — DEXTROSE AND SODIUM CHLORIDE 125 ML/HR: 5; .9 INJECTION, SOLUTION INTRAVENOUS at 00:56

## 2018-06-23 RX ADMIN — CHLORHEXIDINE GLUCONATE 15 ML: 1.2 RINSE ORAL at 21:53

## 2018-06-23 RX ADMIN — HEPARIN SODIUM 5000 UNITS: 5000 INJECTION, SOLUTION INTRAVENOUS; SUBCUTANEOUS at 05:53

## 2018-06-23 RX ADMIN — GABAPENTIN 300 MG: 300 CAPSULE ORAL at 22:58

## 2018-06-23 RX ADMIN — HEPARIN SODIUM 5000 UNITS: 5000 INJECTION, SOLUTION INTRAVENOUS; SUBCUTANEOUS at 13:04

## 2018-06-23 RX ADMIN — CLONIDINE HYDROCHLORIDE 0.1 MG: 0.1 TABLET ORAL at 08:36

## 2018-06-23 RX ADMIN — CLONIDINE HYDROCHLORIDE 0.1 MG: 0.1 TABLET ORAL at 17:30

## 2018-06-23 RX ADMIN — AMLODIPINE BESYLATE 10 MG: 10 TABLET ORAL at 08:36

## 2018-06-23 RX ADMIN — TIZANIDINE 4 MG: 4 TABLET ORAL at 22:59

## 2018-06-23 RX ADMIN — NICOTINE 14 MG: 14 PATCH TRANSDERMAL at 08:36

## 2018-06-23 RX ADMIN — ASPIRIN 81 MG: 81 TABLET, COATED ORAL at 08:36

## 2018-06-23 NOTE — PLAN OF CARE
CARDIOVASCULAR - ADULT     Maintains optimal cardiac output and hemodynamic stability Progressing        DISCHARGE PLANNING     Discharge to home or other facility with appropriate resources Progressing        GENITOURINARY - ADULT     Maintains or returns to baseline urinary function Progressing        INFECTION - ADULT     Absence or prevention of progression during hospitalization Progressing        Knowledge Deficit     Patient/family/caregiver demonstrates understanding of disease process, treatment plan, medications, and discharge instructions Progressing        METABOLIC, FLUID AND ELECTROLYTES - ADULT     Glucose maintained within target range Progressing        PAIN - ADULT     Verbalizes/displays adequate comfort level or baseline comfort level Progressing        Potential for Falls     Patient will remain free of falls Progressing        Prexisting or High Potential for Compromised Skin Integrity     Skin integrity is maintained or improved Progressing

## 2018-06-23 NOTE — PROGRESS NOTES
NEPHROLOGY PROGRESS NOTE    Summer Rausch 68 y o  male MRN: [id number]  Unit/Bed#: ICU 05 Encounter: [id number]  Reason for Consult:  Acute renal failure    The patient is awake and alert overnight hemodynamically stable no other significant changes or problems  He is more awake and alert  ASSESSMENT/PLAN:  1  Renal  The patient had acute renal failure which seems primarily to be due to pre renal azotemia because with IV fluids with had significant improvement creatinine is approaching normal baseline  At this point time he is on some IV fluids but will be eating he is fully awake  I did not see the orders with IV fluids  Electrolytes and acid-base status are normal   Of note urine protein creatinine ratio was virtually normal with a very low estimation of proteinuria  Can discontinue IV fluids once patient is eating  Will sign off please call if there is anything we can do to help with in the future  SUBJECTIVE:  Review of Systems   Constitution: Negative for chills and fever  Feels tired but fully awake and alert  HENT: Negative  Eyes: Negative  Cardiovascular: Negative  Negative for chest pain, leg swelling and orthopnea  Respiratory: Negative  Negative for cough and shortness of breath  Gastrointestinal: Negative  Genitourinary: Negative  OBJECTIVE:  Current Weight: Weight - Scale: 81 6 kg (179 lb 14 3 oz)  Vitals:Temp (24hrs), Av 6 °F (37 °C), Min:97 7 °F (36 5 °C), Max:99 1 °F (37 3 °C)  Current: Temperature: 98 9 °F (37 2 °C)   Blood pressure 159/69, pulse 70, temperature 98 9 °F (37 2 °C), temperature source Temporal, resp  rate 21, height 5' 5" (1 651 m), weight 81 6 kg (179 lb 14 3 oz), SpO2 99 %  , Body mass index is 29 94 kg/m²        Intake/Output Summary (Last 24 hours) at 18 0739  Last data filed at 18 0547   Gross per 24 hour   Intake          2925 33 ml   Output             4275 ml   Net         -1349 67 ml       Physical Exam: BP 159/69   Pulse 70   Temp 98 9 °F (37 2 °C) (Temporal)   Resp 21   Ht 5' 5" (1 651 m)   Wt 81 6 kg (179 lb 14 3 oz)   SpO2 99%   BMI 29 94 kg/m²   Physical Exam   Constitutional: No distress  HENT:   Mouth/Throat: No oropharyngeal exudate  Eyes: No scleral icterus  Neck: Neck supple  No JVD present  Cardiovascular: Normal rate  Exam reveals no friction rub  Pulmonary/Chest: Effort normal and breath sounds normal  No respiratory distress  He has no wheezes  He has no rales  Abdominal: Soft  Bowel sounds are normal  He exhibits no distension  There is no tenderness  There is no rebound         Medications:    Current Facility-Administered Medications:     acetaminophen (TYLENOL) tablet 650 mg, 650 mg, Oral, Q6H PRN, Darlyne Milan Spatzer, CRNP    amLODIPine (NORVASC) tablet 10 mg, 10 mg, Oral, Daily, Darlyne Milan Spatzer, CRNP    aspirin (ECOTRIN LOW STRENGTH) EC tablet 81 mg, 81 mg, Oral, Daily, Darlyne Milan Spatzer, CRNP    atorvastatin (LIPITOR) tablet 40 mg, 40 mg, Oral, After Tristen Hamper Spatzer, CRNP, 40 mg at 06/22/18 1745    chlorhexidine (PERIDEX) 0 12 % oral rinse 15 mL, 15 mL, Swish & Spit, Q12H [facility] HOME, Darlyne Milan Spatzer, CRNP, 15 mL at 06/22/18 2312    cloNIDine (CATAPRES) tablet 0 1 mg, 0 1 mg, Oral, BID, Darlyne Milan Spatzer, CRNP, 0 1 mg at 06/22/18 1745    gabapentin (NEURONTIN) capsule 300 mg, 300 mg, Oral, HS, Darlyne Milan Spatzer, CRNP    heparin (porcine) subcutaneous injection 5,000 Units, 5,000 Units, Subcutaneous, Q8H [facility] HOME, 5,000 Units at 06/23/18 0553 **AND** Platelet count, , , Once, [employer]ELIESER    insulin lispro (HumaLOG) 100 units/mL subcutaneous injection 1-5 Units, 1-5 Units, Subcutaneous, Q6H [facility] **AND** Fingerstick Glucose (POCT), , , Q6H, Darlyne Milan Spatzer, CRNP    methadone (DOLOPHINE) 10 mg/mL oral concentrated solution 90 mg, 90 mg, Oral, Daily, ELIESER Hawley    naloxone (NARCAN) 0 04 mg/mL syringe 0 04 mg, 0 04 mg, Intravenous, Q1MIN PRN, AfterYes, ELIESER, 0 04 mg at 06/22/18 0258    nicotine (NICODERM CQ) 14 mg/24hr TD 24 hr patch 14 mg, 14 mg, Transdermal, Daily, Melvern Hedger Spatzer, CRNP, 14 mg at 06/22/18 0813    ondansetron (ZOFRAN) injection 4 mg, 4 mg, Intravenous, Q4H PRN, Melvern Hedger Spatzer, CRNP    tamsulosin [facility]) capsule 0 4 mg, 0 4 mg, Oral, Daily With Eddye Bible Spatzer, CRNP, 0 4 mg at 06/22/18 1745    tiZANidine (ZANAFLEX) tablet 4 mg, 4 mg, Oral, HS, ELIESER Beatty    Laboratory Results:  Lab Results   Component Value Date    WBC 7 87 06/23/2018    HGB 11 4 (L) 06/23/2018    HCT 36 0 (L) 06/23/2018    MCV 95 06/23/2018     (L) 06/23/2018     Lab Results   Component Value Date    GLUCOSE 96 06/23/2018    CALCIUM 8 9 06/23/2018     06/23/2018    K 4 3 06/23/2018    CO2 28 06/23/2018     (H) 06/23/2018    BUN 28 (H) 06/23/2018    CREATININE 1 45 (H) 06/23/2018     Lab Results   Component Value Date    CALCIUM 8 9 06/23/2018    PHOS 3 2 06/22/2018     No results found for: LABPROT

## 2018-06-23 NOTE — PROGRESS NOTES
Progress Note - Critical Care   West Esparza 68 y o  male MRN: [id number]  Unit/Bed#: ICU 05 Encounter: [id number]    Assessment/Plan:  1  Acute hypercapnic respiratory failure  · Improved  Pt successfully extubated yesterday  Monitor respiratory and mental status  · Encourage cough and deep breathing  · OOB to chair during the day  2  Encephalopathy likely secondary to heroin use  · Mental status has improved though pt is still somewhat lethargic  · Continue close neurological monitoring  · Continue IV fluid hydration for now until PO intake is adequate  3  MICH, likely secondary to prerenal azotemia  · Improving  Nephrology is following  · Creatinine improving  Trend renal indices and monitor intake and output  4  Dm Type II  · Blood sugar is controlled  Continue dextrose in maintenance fluids for now  · Continue accuchecks with SSI coverage  Goal to maintain -180  5  Hx of opioid abuse on methadone with suspicion of recent heroine use  · Continue methadone per home dose  6  Essential HTN  · Continue norvasc and clonidine  Holding lisinopril in the setting of MICH      _____________________________________________________________________    HPI/24hr events:   Afebrile  Pt extubated yesterday    No acute events overnight    Medications:    Current Facility-Administered Medications:  acetaminophen 650 mg Oral Q6H PRN Dorthula Robertson Spatzer, CRNP    amLODIPine 10 mg Oral Daily Dorthula Robertson Spatzer, CRNP    aspirin 81 mg Oral Daily Dorthula Robertson Spatzer, CRNP    atorvastatin 40 mg Oral After [employer] Spatzer, CRNP    cefepime 1,000 mg Intravenous Q24H ELIESER Thomas Last Rate: 1,000 mg (06/22/18 1859)   chlorhexidine 15 mL Swish & Spit Q12H [facility] Dorthula Robertson Spatzer, CRNP    cloNIDine 0 1 mg Oral BID Dorthula Robertson Spatzer, CRNP    dexmedetomidine 0 1-0 7 mcg/kg/hr Intravenous Titrated Dorthula Robertson Spatzer, CRNP Last Rate: Stopped (06/22/18 0330)   dextrose 5 % and sodium chloride 0 9 % 125 mL/hr Intravenous Continuous Dorthula Robertson Spatzer, CRNP Last Rate: 125 mL/hr (06/23/18 0056)   fentanyl citrate (PF) 50 mcg Intravenous Q2H PRN Dorthula Robertson Spatzer, CRNP    gabapentin 300 mg Oral HS Dorthula Robertson Spatzer, CRNP    heparin (porcine) 5,000 Units Subcutaneous Q8H [address] Dorthula Robertson Spatzer, CRNP    insulin lispro 1-5 Units Subcutaneous Q6H [address] Dorthula Robertson Spatzer, CRNP    methadone 90 mg Oral Daily ELIESER Thomas    naloxone 0 04 mg Intravenous Q1MIN PRN Dorthula Robertson Spatzer, CRNP    nicotine 14 mg Transdermal Daily Dorthula Robertson Spatzer, CRNP    ondansetron 4 mg Intravenous Q4H PRN Dorthula Robertson Spatzer, CRNP    propofol 5-50 mcg/kg/min Intravenous Titrated Dorthula Robertson Spatzer, CRNP Last Rate: Stopped (06/22/18 1310)   tamsulosin 0 4 mg Oral Daily With Dinner Dorthula Robertson Spatzer, CRNP    tiZANidine 4 mg Oral HS Dorthula Robertson Spatzer, CRNP          dexmedetomidine 0 1-0 7 mcg/kg/hr Last Rate: Stopped (06/22/18 0330)   dextrose 5 % and sodium chloride 0 9 % 125 mL/hr Last Rate: 125 mL/hr (06/23/18 0056)   propofol 5-50 mcg/kg/min Last Rate: Stopped (06/22/18 1310)         Physical exam:  Vitals: Body mass index is 29 94 kg/m²  Blood pressure 159/69, pulse 70, temperature 98 9 °F (37 2 °C), temperature source Temporal, resp  rate 21, height 5' 5" (1 651 m), weight 81 6 kg (179 lb 14 3 oz), SpO2 99 %  ,  Temp  Min: 97 1 °F (36 2 °C)  Max: 99 1 °F (37 3 °C)  IBW: 61 5 kg    SpO2: 99 %  SpO2 Activity: At Rest  O2 Device: Nasal cannula      Intake/Output Summary (Last 24 hours) at 06/23/18 9568  Last data filed at 06/23/18 0512   Gross per 24 hour   Intake          2925 33 ml   Output             4275 ml   Net         -1349 67 ml       Invasive/non-invasive ventilation settings:   Respiratory    Lab Data (Last 4 hours)    None         O2/Vent Data (Last 4 hours)    None              Invasive Devices     Peripheral Intravenous Line            Peripheral IV 06/21/18 Left Hand 1 day    Peripheral IV 06/22/18 Right Wrist 1 day    Peripheral IV 06/22/18 Left Forearm less than 1 day                  Physical Exam:  Gen:  Somnolent but easily arousable, no acute distress  HEENT:  Atraumatic, normocephalic, extraocular movements intact, pupils 1 mm equal bilaterally, oropharynx clear  Neck:  Supple, trachea midline, no JVD, no lymphadenopathy  Chest:  Clear to auscultation bilaterally  Cor:  Single E4/H4, 2/6 systolic murmur, no rubs or gallops regular rate and rhythm  Abd:  Soft, nontender, nondistended, bowel sounds normoactive  Ext:  No edema, no clubbing or cyanosis  Neuro:  Oriented to person and place, some confusion as to time, moves all extremities, follows commands, cranial nerves 2-12 grossly intact, no focal deficits  Skin:  Warm, dry      Diagnostic Data:  Lab: I have personally reviewed pertinent lab results  CBC:     Results from last 7 days  Lab Units 06/23/18  0428 06/21/18  1847   WBC Thousand/uL 7 87 9 79   HEMOGLOBIN g/dL 11 4* 11 7*   HEMATOCRIT % 36 0* 36 7   PLATELETS Thousands/uL 117* 146*       CMP:     Results from last 7 days  Lab Units 06/23/18  0428 06/22/18  1501 06/22/18  0406  06/21/18  1847   SODIUM mmol/L 144 145 141  < > 138   POTASSIUM mmol/L 4 3 4 6 5 3  < > 5 6*   CHLORIDE mmol/L 112* 110* 109*  < > 101   CO2 mmol/L 28 26 22  < > 26   BUN mg/dL 28* 49* 65*  < > 71*   CREATININE mg/dL 1 45* 2 77* 4 51*  < > 6 48*   CALCIUM mg/dL 8 9 8 2* 8 3  < > 8 7   TOTAL PROTEIN g/dL  --   --   --   --  6 9   BILIRUBIN TOTAL mg/dL  --   --   --   --  0 34   ALK PHOS U/L  --   --   --   --  88   ALT U/L  --   --   --   --  23   AST U/L  --   --   --   --  19   GLUCOSE RANDOM mg/dL 96 98 117  < > 110   < > = values in this interval not displayed    PT/INR:   No results found for: PT, INR,   Magnesium:     Results from last 7 days  Lab Units 06/22/18  1501   MAGNESIUM mg/dL 2 3     Phosphorous:     Results from last 7 days  Lab Units 06/22/18  1501   PHOSPHORUS mg/dL 3 2       Microbiology:    Results from last 7 days  Lab Units 06/21/18 2018   URINE CULTURE [id number] cfu/ml        Imaging:  No new imaging    Cardiac lab/EKG/telemetry/ECHO:   Sinus rhythm on telemetry    VTE Prophylaxis: Heparin, SCDs    Code Status: Level 1 - Full Code    Pixie Spruce Spatzer, CRNP    Portions of the record may have been created with voice recognition software  Occasional wrong word or "sound a like" substitutions may have occurred due to the inherent limitations of voice recognition software  Read the chart carefully and recognize, using context, where substitutions have occurred

## 2018-06-23 NOTE — PROGRESS NOTES
Report called to SHERITA Zaidi RN on [place]  Patient transported via wheelchair by Juhi Doe RN to room 203  Belongings were sent with the patient  Family at bedside at time of transfer

## 2018-06-24 LAB
GLUCOSE SERPL-MCNC: 159 MG/DL (ref 65–140)
GLUCOSE SERPL-MCNC: 224 MG/DL (ref 65–140)
GLUCOSE SERPL-MCNC: 75 MG/DL (ref 65–140)
GLUCOSE SERPL-MCNC: 85 MG/DL (ref 65–140)

## 2018-06-24 PROCEDURE — 82948 REAGENT STRIP/BLOOD GLUCOSE: CPT

## 2018-06-24 PROCEDURE — 99232 SBSQ HOSP IP/OBS MODERATE 35: CPT | Performed by: INTERNAL MEDICINE

## 2018-06-24 RX ADMIN — TIZANIDINE 4 MG: 4 TABLET ORAL at 21:39

## 2018-06-24 RX ADMIN — GABAPENTIN 300 MG: 300 CAPSULE ORAL at 21:39

## 2018-06-24 RX ADMIN — AMLODIPINE BESYLATE 10 MG: 10 TABLET ORAL at 08:29

## 2018-06-24 RX ADMIN — ATORVASTATIN CALCIUM 40 MG: 40 TABLET, FILM COATED ORAL at 17:29

## 2018-06-24 RX ADMIN — HEPARIN SODIUM 5000 UNITS: 5000 INJECTION, SOLUTION INTRAVENOUS; SUBCUTANEOUS at 21:39

## 2018-06-24 RX ADMIN — METHADONE HYDROCHLORIDE 90 MG: 10 CONCENTRATE ORAL at 08:30

## 2018-06-24 RX ADMIN — INSULIN LISPRO 2 UNITS: 100 INJECTION, SOLUTION INTRAVENOUS; SUBCUTANEOUS at 11:40

## 2018-06-24 RX ADMIN — CHLORHEXIDINE GLUCONATE 15 ML: 1.2 RINSE ORAL at 08:29

## 2018-06-24 RX ADMIN — TAMSULOSIN HYDROCHLORIDE 0.4 MG: 0.4 CAPSULE ORAL at 17:29

## 2018-06-24 RX ADMIN — HEPARIN SODIUM 5000 UNITS: 5000 INJECTION, SOLUTION INTRAVENOUS; SUBCUTANEOUS at 06:14

## 2018-06-24 RX ADMIN — CLONIDINE HYDROCHLORIDE 0.1 MG: 0.1 TABLET ORAL at 08:29

## 2018-06-24 RX ADMIN — NICOTINE 14 MG: 14 PATCH TRANSDERMAL at 08:29

## 2018-06-24 RX ADMIN — ASPIRIN 81 MG: 81 TABLET, COATED ORAL at 08:30

## 2018-06-24 RX ADMIN — CLONIDINE HYDROCHLORIDE 0.1 MG: 0.1 TABLET ORAL at 17:29

## 2018-06-24 RX ADMIN — HEPARIN SODIUM 5000 UNITS: 5000 INJECTION, SOLUTION INTRAVENOUS; SUBCUTANEOUS at 13:16

## 2018-06-24 RX ADMIN — INSULIN LISPRO 1 UNITS: 100 INJECTION, SOLUTION INTRAVENOUS; SUBCUTANEOUS at 17:29

## 2018-06-24 RX ADMIN — CHLORHEXIDINE GLUCONATE 15 ML: 1.2 RINSE ORAL at 21:39

## 2018-06-24 NOTE — SOCIAL WORK
CM received consult for [facility]  Program  Called and faxed over documentation for referral requesting an assessment  Attempted to meet with patient at bedside and he was sound asleep  Will follow-up later in the day  UPDATE: [facility]  Met with patient, and at this time, he is unwilling to participate  He reported to Joel Jackson that "he needs to thing about it " Entered assessment from them into [facility]  CM went in to open patient again; he was asleep again

## 2018-06-25 ENCOUNTER — APPOINTMENT (INPATIENT)
Dept: RADIOLOGY | Facility: HOSPITAL | Age: 73
DRG: 070 | End: 2018-06-25
Payer: COMMERCIAL

## 2018-06-25 LAB
ANION GAP SERPL CALCULATED.3IONS-SCNC: 9 MMOL/L (ref 4–13)
BUN SERPL-MCNC: 22 MG/DL (ref 5–25)
CALCIUM SERPL-MCNC: 8.9 MG/DL (ref 8.3–10.1)
CHLORIDE SERPL-SCNC: 109 MMOL/L (ref 100–108)
CO2 SERPL-SCNC: 25 MMOL/L (ref 21–32)
CREAT SERPL-MCNC: 1.3 MG/DL (ref 0.6–1.3)
GFR SERPL CREATININE-BSD FRML MDRD: 55 ML/MIN/1.73SQ M
GLUCOSE SERPL-MCNC: 113 MG/DL (ref 65–140)
GLUCOSE SERPL-MCNC: 122 MG/DL (ref 65–140)
GLUCOSE SERPL-MCNC: 124 MG/DL (ref 65–140)
GLUCOSE SERPL-MCNC: 135 MG/DL (ref 65–140)
GLUCOSE SERPL-MCNC: 90 MG/DL (ref 65–140)
GLUCOSE SERPL-MCNC: 92 MG/DL (ref 65–140)
POTASSIUM SERPL-SCNC: 4.2 MMOL/L (ref 3.5–5.3)
SODIUM SERPL-SCNC: 143 MMOL/L (ref 136–145)

## 2018-06-25 PROCEDURE — 80048 BASIC METABOLIC PNL TOTAL CA: CPT | Performed by: INTERNAL MEDICINE

## 2018-06-25 PROCEDURE — 99232 SBSQ HOSP IP/OBS MODERATE 35: CPT | Performed by: INTERNAL MEDICINE

## 2018-06-25 PROCEDURE — 82948 REAGENT STRIP/BLOOD GLUCOSE: CPT

## 2018-06-25 PROCEDURE — 73030 X-RAY EXAM OF SHOULDER: CPT

## 2018-06-25 RX ADMIN — NICOTINE 14 MG: 14 PATCH TRANSDERMAL at 08:44

## 2018-06-25 RX ADMIN — ATORVASTATIN CALCIUM 40 MG: 40 TABLET, FILM COATED ORAL at 17:20

## 2018-06-25 RX ADMIN — CLONIDINE HYDROCHLORIDE 0.1 MG: 0.1 TABLET ORAL at 17:20

## 2018-06-25 RX ADMIN — ASPIRIN 81 MG: 81 TABLET, COATED ORAL at 08:42

## 2018-06-25 RX ADMIN — METHADONE HYDROCHLORIDE 90 MG: 10 CONCENTRATE ORAL at 14:23

## 2018-06-25 RX ADMIN — TIZANIDINE 4 MG: 4 TABLET ORAL at 22:18

## 2018-06-25 RX ADMIN — HEPARIN SODIUM 5000 UNITS: 5000 INJECTION, SOLUTION INTRAVENOUS; SUBCUTANEOUS at 14:22

## 2018-06-25 RX ADMIN — HEPARIN SODIUM 5000 UNITS: 5000 INJECTION, SOLUTION INTRAVENOUS; SUBCUTANEOUS at 06:30

## 2018-06-25 RX ADMIN — GABAPENTIN 300 MG: 300 CAPSULE ORAL at 22:18

## 2018-06-25 RX ADMIN — HEPARIN SODIUM 5000 UNITS: 5000 INJECTION, SOLUTION INTRAVENOUS; SUBCUTANEOUS at 22:19

## 2018-06-25 RX ADMIN — CHLORHEXIDINE GLUCONATE 15 ML: 1.2 RINSE ORAL at 08:47

## 2018-06-25 RX ADMIN — TAMSULOSIN HYDROCHLORIDE 0.4 MG: 0.4 CAPSULE ORAL at 17:20

## 2018-06-25 NOTE — SOCIAL WORK
CM met with pt at bedside to discuss discharge needs  Pt lives in a 2 story house with his wife and granddaughter  ADL's are completed independently with no DME use  PCP identified as Dr Benson Jeffries MD  Pt states his pharmacy is in [place]  Pt denies VNA/STR hx  Pt is currently active with the methadone clinic which he states he has been going to for 15 years  Pt was found to have heroin on him when admitted to the ED  Pt states he got it for someone else and he was just holding it  Physician and CM both reiterated to the pt, even having possession of the drug could potentially get him kicked out of the program  Pt confirmed he understands the risk he is taking  Pt relies on public transportation  He reports he will walk home because his home is very close to the hospital  Pt denies POA  No needs expressed or identified  CM to follow as needed

## 2018-06-25 NOTE — CASE MANAGEMENT
Continued Stay Review    Date: 6/25/2018    Vital Signs: /71 (BP Location: Right arm)   Pulse (!) 52   Temp (!) 97 3 °F (36 3 °C) (Tympanic)   Resp 18   Ht 5' 5" (1 651 m)   Wt 81 6 kg (179 lb 14 3 oz)   SpO2 91%   BMI 29 94 kg/m²     Medications:   Scheduled Meds:   Current Facility-Administered Medications:  acetaminophen 650 mg Oral Q6H PRN Derik [place] Spalupeer, CRNP   albuterol 2 puff Inhalation Q4H PRN [place] Nanci, CRNP   amLODIPine 10 mg Oral Daily Derik Yrn Spatzer, CRNP   aspirin 81 mg Oral Daily Derik Yrn Spalupeer, CRNP   atorvastatin 40 mg Oral After [employer] Spatzer, CRNP   cloNIDine 0 1 mg Oral BID Derik [place] Spatzer, CRNP   gabapentin 300 mg Oral HS Derik Yrn Spalupeer, CRNP   heparin (porcine) 5,000 Units Subcutaneous Q8H [address] Derik [place] Spalupeer, CRNP   hydrALAZINE 10 mg Intravenous Q6H PRN [place] Nanci, CRNP   methadone 90 mg Oral Daily [place] Nanci, CRNP   naloxone 0 04 mg Intravenous Q1MIN PRN Derik [place] Spatzer, CRNP   nicotine 14 mg Transdermal Daily Derik Yrn Spatzer, CRNP   tamsulosin 0 4 mg Oral Daily With [employer] Spatzer, CRNP   tiZANidine 4 mg Oral HS Derik Yrn Spafarhan, CRNP     Continuous Infusions:    PRN Meds:   acetaminophen    albuterol    hydrALAZINE    naloxone    Abnormal Labs/Diagnostic Results: cl 109---bs     Age/Sex: 68 y o  male     Assessment/Plan: Assessment/Plan:     Acute kidney injury                  appears pre renal and resolved with IV fluids creatinine improved to 1 30 continue to encourage fluid intake, appears baseline creatinine was 1 1     History of heroin abuse           continue methadone maintenance program, refusing host program/rehab     Dyslipidemia                            continue statin for LDL control     Hypertension                           well controlled with clonidine and Norvasc     Left shoulder pain                   check x-ray to rule out acute injury     Tobacco abuse                       cessation counseling provided continue nicotine patch              Diabetes                                  last 24 hour blood glucose 92 to 159, well controlled with ADA diet    Discharge Plan: at this time refusing HOST program

## 2018-06-25 NOTE — PROGRESS NOTES
Progress Note - Adonis Russell 68 y o  male MRN: [id number]    Unit/Bed#: [place] [address] -01 Encounter: [id number]    Assessment/Plan:    Acute kidney injury  appears pre renal and resolved with IV fluids creatinine improved to 1 30 continue to encourage fluid intake, appears baseline creatinine was 1 1    History of heroin abuse continue methadone maintenance program, refusing host program/rehab    Dyslipidemia   continue statin for LDL control    Hypertension   well controlled with clonidine and Norvasc    Left shoulder pain  check x-ray to rule out acute injury    Tobacco abuse  cessation counseling provided continue nicotine patch     Diabetes   last 24 hour blood glucose 92 to 159, well controlled with ADA diet    Subjective:   Complaint of left shoulder pain, denies chest pain shortness of breath nausea vomiting diarrhea no fevers chills appetite is okay    Objective:     Vitals: Blood pressure 124/71, pulse (!) 52, temperature (!) 97 3 °F (36 3 °C), temperature source Tympanic, resp  rate 18, height 5' 5" (1 651 m), weight 81 6 kg (179 lb 14 3 oz), SpO2 91 %  ,Body mass index is 29 94 kg/m²  Results from last 7 days  Lab Units 06/23/18  0428   WBC Thousand/uL 7 87   HEMOGLOBIN g/dL 11 4*   HEMATOCRIT % 36 0*   PLATELETS Thousands/uL 117*       Results from last 7 days  Lab Units 06/25/18  0606  06/21/18  1847   SODIUM mmol/L 143  < > 138   POTASSIUM mmol/L 4 2  < > 5 6*   CHLORIDE mmol/L 109*  < > 101   CO2 mmol/L 25  < > 26   BUN mg/dL 22  < > 71*   CREATININE mg/dL 1 30  < > 6 48*   CALCIUM mg/dL 8 9  < > 8 7   TOTAL PROTEIN g/dL  --   --  6 9   BILIRUBIN TOTAL mg/dL  --   --  0 34   ALK PHOS U/L  --   --  88   ALT U/L  --   --  23   AST U/L  --   --  19   GLUCOSE RANDOM mg/dL 90  < > 110   < > = values in this interval not displayed      Scheduled Meds:    Current Facility-Administered Medications:  acetaminophen 650 mg Oral Q6H PRN Suzzanna Ishikawa Spatzer, CRNP   albuterol 2 puff Inhalation Q4H PRN Juli Payne, RENEENP   amLODIPine 10 mg Oral Daily James Sears Spatzer, CRNP   aspirin 81 mg Oral Daily [facility] Spatzer, CRNP   atorvastatin 40 mg Oral After [employer] Spatzer, CRNP   chlorhexidine 15 mL Swish & Spit Q12H [facility] Spatzer, CRNP   cloNIDine 0 1 mg Oral BID James Sears Spatzer, CRNP   gabapentin 300 mg Oral HS James Sears Spatzer, CRNP   heparin (porcine) 5,000 Units Subcutaneous Q8H [facility] Spatzer, CRNP   hydrALAZINE 10 mg Intravenous Q6H PRN ELIESER Jose   insulin lispro 1-5 Units Subcutaneous Q6H [facility] Spatzer, ELIESER   methadone 90 mg Oral Daily MalinatricELIESER [place]   naloxone 0 04 mg Intravenous Q1MIN PRN James Sears Spatzer, CRNP   nicotine 14 mg Transdermal Daily James Sears Spatzer, CRNP   ondansetron 4 mg Intravenous Q4H PRN [facility] Spatzer, CRNP   tamsulosin 0 4 mg Oral Daily With [employer] Spatzer, CRNP   tiZANidine 4 mg Oral HS [facility] Spatzer, CRNP       Continuous Infusions:     Physical exam:  General appearance:  Alert no distress interaction appropriate   Head/Eyes:  Nonicteric PERRL EOMI  Neck:  Supple  Lungs: CTA bilateral no wheezing rhonchi or rales  Heart: normal S1 S2 regular  Abdomen: Soft nontender with bowel sounds  Extremities: no edema left upper extremity restricted range of motion with abduction and external rotation related to pain no erythema no swelling  Skin: no rash    Invasive Devices     Peripheral Intravenous Line            Peripheral IV 06/21/18 Left Hand 3 days    Peripheral IV 06/22/18 Right Wrist 3 days                  VTE Pharmacologic Prophylaxis:  heparin   VTE Mechanical Prophylaxis:  SCDs                     Counseling / Coordination of Care  Total floor / unit time spent today 30  minutes  Greater than 50% of total time was spent with the patient and / or family counseling and / or coordination of care    A description of the counseling / coordination of care:

## 2018-06-26 VITALS
TEMPERATURE: 97.1 F | OXYGEN SATURATION: 96 % | DIASTOLIC BLOOD PRESSURE: 80 MMHG | BODY MASS INDEX: 29.97 KG/M2 | WEIGHT: 179.9 LBS | RESPIRATION RATE: 20 BRPM | HEIGHT: 65 IN | SYSTOLIC BLOOD PRESSURE: 155 MMHG | HEART RATE: 68 BPM

## 2018-06-26 LAB
ANION GAP SERPL CALCULATED.3IONS-SCNC: 9 MMOL/L (ref 4–13)
BUN SERPL-MCNC: 21 MG/DL (ref 5–25)
CALCIUM SERPL-MCNC: 8.8 MG/DL (ref 8.3–10.1)
CHLORIDE SERPL-SCNC: 106 MMOL/L (ref 100–108)
CO2 SERPL-SCNC: 23 MMOL/L (ref 21–32)
CREAT SERPL-MCNC: 1.11 MG/DL (ref 0.6–1.3)
GFR SERPL CREATININE-BSD FRML MDRD: 66 ML/MIN/1.73SQ M
GLUCOSE SERPL-MCNC: 73 MG/DL (ref 65–140)
GLUCOSE SERPL-MCNC: 82 MG/DL (ref 65–140)
POTASSIUM SERPL-SCNC: 4.8 MMOL/L (ref 3.5–5.3)
SODIUM SERPL-SCNC: 138 MMOL/L (ref 136–145)

## 2018-06-26 PROCEDURE — 82948 REAGENT STRIP/BLOOD GLUCOSE: CPT

## 2018-06-26 PROCEDURE — 80048 BASIC METABOLIC PNL TOTAL CA: CPT | Performed by: INTERNAL MEDICINE

## 2018-06-26 PROCEDURE — 99239 HOSP IP/OBS DSCHRG MGMT >30: CPT | Performed by: INTERNAL MEDICINE

## 2018-06-26 RX ORDER — NICOTINE 21 MG/24HR
PATCH, TRANSDERMAL 24 HOURS TRANSDERMAL
Qty: 28 PATCH | Refills: 0 | Status: SHIPPED | OUTPATIENT
Start: 2018-06-26

## 2018-06-26 RX ORDER — LISINOPRIL 10 MG/1
10 TABLET ORAL DAILY
Qty: 30 TABLET | Refills: 0 | Status: ON HOLD | OUTPATIENT
Start: 2018-06-26 | End: 2021-09-24 | Stop reason: ALTCHOICE

## 2018-06-26 RX ORDER — LISINOPRIL 10 MG/1
10 TABLET ORAL DAILY
Status: DISCONTINUED | OUTPATIENT
Start: 2018-06-26 | End: 2018-06-26 | Stop reason: HOSPADM

## 2018-06-26 RX ADMIN — CLONIDINE HYDROCHLORIDE 0.1 MG: 0.1 TABLET ORAL at 09:28

## 2018-06-26 RX ADMIN — ASPIRIN 81 MG: 81 TABLET, COATED ORAL at 09:28

## 2018-06-26 RX ADMIN — HEPARIN SODIUM 5000 UNITS: 5000 INJECTION, SOLUTION INTRAVENOUS; SUBCUTANEOUS at 13:48

## 2018-06-26 RX ADMIN — METHADONE HYDROCHLORIDE 90 MG: 10 CONCENTRATE ORAL at 09:28

## 2018-06-26 RX ADMIN — ALBUTEROL SULFATE 2 PUFF: 90 AEROSOL, METERED RESPIRATORY (INHALATION) at 13:49

## 2018-06-26 RX ADMIN — ALBUTEROL SULFATE 2 PUFF: 90 AEROSOL, METERED RESPIRATORY (INHALATION) at 09:36

## 2018-06-26 RX ADMIN — LISINOPRIL 10 MG: 10 TABLET ORAL at 13:48

## 2018-06-26 RX ADMIN — NICOTINE 14 MG: 14 PATCH TRANSDERMAL at 09:27

## 2018-06-26 RX ADMIN — HEPARIN SODIUM 5000 UNITS: 5000 INJECTION, SOLUTION INTRAVENOUS; SUBCUTANEOUS at 05:40

## 2018-06-26 RX ADMIN — AMLODIPINE BESYLATE 10 MG: 10 TABLET ORAL at 09:28

## 2018-06-26 NOTE — NURSING NOTE
Pt  D/C home  D/C instructions reviewed with pt  Daughter helped translate to [address]   Pt  Sent home with all personal items and scripts  Pt  Escorted to car by RN

## 2018-06-26 NOTE — PLAN OF CARE
CARDIOVASCULAR - ADULT     Maintains optimal cardiac output and hemodynamic stability Adequate for Discharge        DISCHARGE PLANNING     Discharge to home or other facility with appropriate resources Adequate for Discharge        DISCHARGE PLANNING - CARE MANAGEMENT     Discharge to post-acute care or home with appropriate resources Adequate for Discharge        GENITOURINARY - ADULT     Maintains or returns to baseline urinary function Adequate for Discharge        INFECTION - ADULT     Absence or prevention of progression during hospitalization Adequate for Discharge        Knowledge Deficit     Patient/family/caregiver demonstrates understanding of disease process, treatment plan, medications, and discharge instructions Adequate for Discharge        METABOLIC, FLUID AND ELECTROLYTES - ADULT     Glucose maintained within target range Adequate for Discharge        PAIN - ADULT     Verbalizes/displays adequate comfort level or baseline comfort level Adequate for Discharge        Potential for Falls     Patient will remain free of falls Adequate for Discharge        Prexisting or High Potential for Compromised Skin Integrity     Skin integrity is maintained or improved Adequate for Discharge

## 2018-06-26 NOTE — DISCHARGE SUMMARY
Discharge Summary - Medical Kenyon Aguilar 68 y o  male MRN: [id number]    [facility] Room / Bed: [place] [address] 2 /South 2 M* Encounter: [id number]    BRIEF OVERVIEW    Admitting Provider: Cammy Gregory MD  Discharge Provider: Neelam Olivera DO  Admission Date: 6/21/2018     Discharge Date: No discharge date for patient encounter  Primary Care Physician at Discharge: Howard Yang [phone]    Primary Discharge Diagnosis  Acute kidney injury    Other Problems Addressed  Patient Active Problem List    Diagnosis Date Noted    [facility] 06/21/2018    Opioid abuse 06/21/2018    Tobacco abuse counseling 03/16/2018    Chest pain 03/14/2018    HTN (hypertension) 03/14/2018    HLD (hyperlipidemia) 03/14/2018    Diabetes mellitus ([facility] ) 03/14/2018    Methadone use ([place] [address] ) 03/14/2018    Renal disease 03/14/2018         Discharge Disposition: home    Allergies  No Known Allergies  Diet restrictions:  diabetic low-salt   Activity restrictions:  none   Discharge Condition:  good       Outpatient Follow-Up   Dr Natasha Mcgill in 1 week   Follow up with consulting providers   sleep study as scheduled      [address] [facility]    Presenting Problem/History of Present Illness  Acute kidney injury superimposed on chronic kidney disease [facility])  Hospital Course  68year-old presented with weakness mostly of his shoulders, been in the emergency department had a rapid deterioration of his mental status with lethargy difficulty arousal was given IV Narcan with improvement and was found to have Heroin in his possession  Acute encephalopathy  patient was admitted to ICU for monitoring, potentially this was a in association with illegal drug use, but the patient adamantly denied  He continued to improved, stabilized, mental status back to normal was transferred to [facility] for continue care      Acute kidney injury  appears prerenal, with IV fluids creatinine returned to baseline 1 11, from 6 48 on admission  Encourage patient to increase fluid intake    History of heroin abuse/methadone maintenance program  as mentioned above care was found in the patient's possession, he reports he was holding it for his friend, but did experience mental status change  He was continue with methadone during his admission, offered rehab through the host program but he refused will continue with methadone maintenance program outpatient  Again denies adamantly that he did any heroin  Diabetes   well controlled with diet during his admission    Hypertension   patient's blood pressure control with lisinopril 10 milligrams and Norvasc 10 milligrams prior to discharge, these are the medications he is discharged on withholding of his hydrochlorothiazide related to recent kidney injury  Tobacco abuse   nicotine patch provided, cessation counseling provided, and on discharge patient promises to stop smoking and will continue use of nicotine patch,  Discharge  Statement   I spent 45 minutes discharging the patient  This time was spent on the day of discharge  I had direct contact with the patient on the day of discharge  Additional documentation is required if more than 30 minutes were spent on discharge  Discussed follow-up and provided appointment for overnight sleep study per patient's request    Discharge instructions/Information to patient and family:   See after visit summary for information provided to patient and family

## 2018-06-26 NOTE — PLAN OF CARE
CARDIOVASCULAR - ADULT     Maintains optimal cardiac output and hemodynamic stability Progressing        DISCHARGE PLANNING     Discharge to home or other facility with appropriate resources Progressing        DISCHARGE PLANNING - CARE MANAGEMENT     Discharge to post-acute care or home with appropriate resources Progressing        GENITOURINARY - ADULT     Maintains or returns to baseline urinary function Progressing        INFECTION - ADULT     Absence or prevention of progression during hospitalization Progressing        Knowledge Deficit     Patient/family/caregiver demonstrates understanding of disease process, treatment plan, medications, and discharge instructions Progressing        METABOLIC, FLUID AND ELECTROLYTES - ADULT     Glucose maintained within target range Progressing        PAIN - ADULT     Verbalizes/displays adequate comfort level or baseline comfort level Progressing        Potential for Falls     Patient will remain free of falls Progressing        Prexisting or High Potential for Compromised Skin Integrity     Skin integrity is maintained or improved Progressing

## 2018-06-26 NOTE — PROGRESS NOTES
Progress Note - Karyle Booker 68 y o  male MRN: [id number]    Unit/Bed#: [place] [address] -01 Encounter: [id number]    Assessment/Plan:    A KI   appears pre renal and resolved creatinine 1 11 at baseline, encourage fluid intake    Diabetes  diet control    Tobacco abuse continue nicotine patch cessation counseling provided    Left shoulder pain much improved, x-ray no acute osseous abnormality, degenerative changes noted    Hypertension  well controlled    Dyslipidemia  continue statin    History of heroin abuse wants to continue with methadone maintenance program, found with 2 bags of heroin on admission patient reports he was holding these for a friend    Subjective:   Feels good offers no complaints, denies chest pain shortness of breath nausea vomiting diarrhea no fevers chills appetite good    Objective:     Vitals: Blood pressure 137/83, pulse 64, temperature (!) 97 1 °F (36 2 °C), temperature source Tympanic, resp  rate 20, height 5' 5" (1 651 m), weight 81 6 kg (179 lb 14 3 oz), SpO2 96 %  ,Body mass index is 29 94 kg/m²  Results from last 7 days  Lab Units 06/23/18  0428   WBC Thousand/uL 7 87   HEMOGLOBIN g/dL 11 4*   HEMATOCRIT % 36 0*   PLATELETS Thousands/uL 117*       Results from last 7 days  Lab Units 06/26/18  0455  06/21/18  1847   SODIUM mmol/L 138  < > 138   POTASSIUM mmol/L 4 8  < > 5 6*   CHLORIDE mmol/L 106  < > 101   CO2 mmol/L 23  < > 26   BUN mg/dL 21  < > 71*   CREATININE mg/dL 1 11  < > 6 48*   CALCIUM mg/dL 8 8  < > 8 7   TOTAL PROTEIN g/dL  --   --  6 9   BILIRUBIN TOTAL mg/dL  --   --  0 34   ALK PHOS U/L  --   --  88   ALT U/L  --   --  23   AST U/L  --   --  19   GLUCOSE RANDOM mg/dL 73  < > 110   < > = values in this interval not displayed      Scheduled Meds:    Current Facility-Administered Medications:  acetaminophen 650 mg Oral Q6H PRN Melvern Hedger Spatzer, CRNP   albuterol 2 puff Inhalation Q4H PRN ELIESER Fatima   amLODIPine 10 mg Oral Daily ELIESER Beatty aspirin 81 mg Oral Daily Meena Gales Spatzer, CRNP   atorvastatin 40 mg Oral After [employer] Spatzer, CRNP   cloNIDine 0 1 mg Oral BID Meena Gales Spatzer, CRNP   gabapentin 300 mg Oral HS Meenasa Gales Spatzer, CRNP   heparin (porcine) 5,000 Units Subcutaneous Q8H [address] Meena Gales Spatzer, RENEENP   hydrALAZINE 10 mg Intravenous Q6H PRN Estephanie Clause, CRNP   methadone 90 mg Oral Daily Estephanie Clause, ELIESER   naloxone 0 04 mg Intravenous Q1MIN PRN Meena Gales Spatzer, CRNP   nicotine 14 mg Transdermal Daily Meena Gales Spatzer, CRNP   tamsulosin 0 4 mg Oral Daily With [employer] Spatzer, ELIESER   tiZANidine 4 mg Oral HS Nilsa Gales Spatzer, CRNP       Continuous Infusions:     Physical exam:  General appearance:  Alert no distress interaction appropriate  Head/Eyes:  Nonicteric PERRL EOMI  Neck:  Supple  Lungs: CTA bilateral no wheezing rhonchi or rales  Heart: normal S1 S2 regular  Abdomen: Soft nontender with bowel sounds  Extremities: no edema  Skin: no rash    Invasive Devices     Peripheral Intravenous Line            Peripheral IV 06/26/18 Right Hand less than 1 day                  VTE Pharmacologic Prophylaxis:  heparin   VTE Mechanical Prophylaxis:  SCDs                     Counseling / Coordination of Care  Total floor / unit time spent today 30   minutes  Greater than 50% of total time was spent with the patient and / or family counseling and / or coordination of care  A description of the counseling / coordination of care: Summer Burciaga

## 2019-03-05 NOTE — CONSULTS
Consultation - Nephrology   Barrett Bey 68 y o  male MRN: [id number]  Unit/Bed#: ICU 05 Encounter: [id number]      Assessment/Plan:  1  Acute kidney injury, likely secondary to prerenal azotemia, could be a component ATN, urinalysis showing numerous hyaline casts and sulfonamide crystals  CT scan without evidence of obstruction  Patient was given 1 dose Toradol  2  Encephalopathy likely secondary to heroin methadone, intubated for airway protection  3  History of hypertension, ACE-inhibitor on hold  4  Hyperkalemia, resolved  5  Diabetes    Plan:  · Overall renal function is improving with IV fluids, again suspecting prerenal azotemia versus component of ATN  · Electrolytes are also improving, potassium now normal at 5 3  · Check urine for protein creatinine ratio  Urine eosinophils  · Again CT scan showed no evidence of obstruction    History of Present Illness   Physician Requesting Consult: Diana Conrad MD  Reason for Consult / Principal Problem:  Acute kidney injury  HPI: Barrett Bey is a 68y o  year old male who presents with weakness  Patient is a 52-year-old male with a known history of hypertension, diabetes as well as opioid abuse on chronic methadone  Presents to the emergency room department with complaints of weakness  This is most pronounced in his upper extremities  Initially in the emergency room department he was awake and alert  Initial workup did show a BUN of 75 and a creatinine of 6 14 with a mild elevated potassium 5 4  Initially his mental status improved with IV Narcan however apparently had gone to bathroom with the administration of higher when  Unfortunately over the evening hours his mental status declined and was subsequently intubated for airway protection  According to the chart there was no other complaints including shortness of breath, chest pain, abdominal pain, nausea vomiting or diarrhea  Denied any flank pain    History obtained from chart review and unobtainable from patient due to mental status    Review of Systems    Review of Systems: pertinent findings as noted above otherwise negative    Historical Information   Patient Active Problem List   Diagnosis    Chest pain    HTN (hypertension)    HLD (hyperlipidemia)    Diabetes mellitus ([place] [address] )    Methadone use ([place] [address] )    Renal disease    Tobacco abuse counseling    Acute kidney injury superimposed on chronic kidney disease ([place] [address] )    Opioid abuse     Past Medical History:   Diagnosis Date    Asthma     Diabetes mellitus ([place] [address] )     Heroin abuse     "20 years" snorting heroin; last use 1 week ago   Hyperlipidemia     Hypertension      History reviewed  No pertinent surgical history  Social History   History   Alcohol Use No     History   Drug Use No     History   Smoking Status    Current Some Day Smoker    Packs/day: 0 25   Smokeless Tobacco    Never Used     History reviewed  No pertinent family history      Meds/Allergies   current meds:   Current Facility-Administered Medications   Medication Dose Route Frequency    acetaminophen (TYLENOL) tablet 650 mg  650 mg Oral Q6H PRN    amLODIPine (NORVASC) tablet 10 mg  10 mg Oral Daily    aspirin (ECOTRIN LOW STRENGTH) EC tablet 81 mg  81 mg Oral Daily    atorvastatin (LIPITOR) tablet 40 mg  40 mg Oral After Dinner    chlorhexidine (PERIDEX) 0 12 % oral rinse 15 mL  15 mL Swish & Spit Q12H [address]    chlorhexidine (PERIDEX) 0 12 % oral rinse 15 mL  15 mL Swish & Spit Q12H [address]    cloNIDine (CATAPRES) tablet 0 1 mg  0 1 mg Oral BID    dexmedetomidine (PRECEDEX) 200 mcg in sodium chloride 0 9 % 50 mL infusion  0 1-0 7 mcg/kg/hr Intravenous Titrated    dextrose 5 % and sodium chloride 0 9 % infusion  125 mL/hr Intravenous Continuous    fentanyl citrate (PF) 100 MCG/2ML 50 mcg  50 mcg Intravenous Q2H PRN    gabapentin (NEURONTIN) capsule 300 mg  300 mg Oral HS    heparin (porcine) subcutaneous injection 5,000 Units  5,000 Units Subcutaneous Q8H [address]    insulin lispro (HumaLOG) 100 units/mL subcutaneous injection 1-5 Units  1-5 Units Subcutaneous Q6H [address]    methadone (DOLOPHINE) 10 mg/mL oral concentrated solution 90 mg  90 mg Oral Daily    naloxone (NARCAN) 0 04 mg/mL syringe 0 04 mg  0 04 mg Intravenous Q1MIN PRN    nicotine (NICODERM CQ) 14 mg/24hr TD 24 hr patch 14 mg  14 mg Transdermal Daily    ondansetron (ZOFRAN) injection 4 mg  4 mg Intravenous Q4H PRN    propofol (DIPRIVAN) 1000 mg in 100 mL infusion (premix)  5-50 mcg/kg/min Intravenous Titrated    tamsulosin (FLOMAX) capsule 0 4 mg  0 4 mg Oral Daily With Dinner    tiZANidine (ZANAFLEX) tablet 4 mg  4 mg Oral HS       No Known Allergies      Objective   /60   Pulse (!) 48   Temp 97 7 °F (36 5 °C) (Temporal)   Resp 16   Ht 5' 5" (1 651 m)   Wt 81 6 kg (179 lb 14 3 oz)   SpO2 100%   BMI 29 94 kg/m²     Intake/Output Summary (Last 24 hours) at 06/22/18 1022  Last data filed at 06/22/18 0801   Gross per 24 hour   Intake          2176 62 ml   Output              300 ml   Net          1876 62 ml       Current Weight: Weight - Scale: 81 6 kg (179 lb 14 3 oz)    Physical Exam   Constitutional: He appears lethargic  He is intubated  HENT:   Head: Normocephalic  Neck: Neck supple  Cardiovascular: Normal rate and regular rhythm  Pulmonary/Chest: He is intubated  No respiratory distress  He has decreased breath sounds  Abdominal: Soft  He exhibits no distension  Musculoskeletal: He exhibits no edema  Neurological: He appears lethargic  Skin: Skin is warm  No rash noted           Lab Results:      Results from last 7 days  Lab Units 06/21/18  1847   WBC Thousand/uL 9 79   HEMOGLOBIN g/dL 11 7*   HEMATOCRIT % 36 7   PLATELETS Thousands/uL 146*       Results from last 7 days  Lab Units 06/22/18  0406   SODIUM mmol/L 141   POTASSIUM mmol/L 5 3   CHLORIDE mmol/L 109*   CO2 mmol/L 22   BUN mg/dL 65*   CREATININE mg/dL 4 51*   GLUCOSE RANDOM mg/dL 117   CALCIUM mg/dL 8 3 Unknown

## 2021-09-22 ENCOUNTER — HOSPITAL ENCOUNTER (INPATIENT)
Facility: HOSPITAL | Age: 76
LOS: 5 days | Discharge: HOME | DRG: 917 | End: 2021-09-28
Attending: EMERGENCY MEDICINE | Admitting: HOSPITALIST
Payer: COMMERCIAL

## 2021-09-22 DIAGNOSIS — F11.10 HEROIN ABUSE (HCC): ICD-10-CM

## 2021-09-22 DIAGNOSIS — T40.1X1A HEROIN OVERDOSE (HCC): ICD-10-CM

## 2021-09-22 DIAGNOSIS — J81.1 PULMONARY EDEMA: Primary | ICD-10-CM

## 2021-09-22 DIAGNOSIS — I10 ESSENTIAL HYPERTENSION: ICD-10-CM

## 2021-09-22 LAB
ALBUMIN SERPL BCP-MCNC: 3.7 G/DL (ref 3.5–5)
ALP SERPL-CCNC: 121 U/L (ref 46–116)
ALT SERPL W P-5'-P-CCNC: 36 U/L (ref 12–78)
AMPHETAMINES SERPL QL SCN: NEGATIVE
ANION GAP SERPL CALCULATED.3IONS-SCNC: 9 MMOL/L (ref 4–13)
AST SERPL W P-5'-P-CCNC: 31 U/L (ref 5–45)
BACTERIA UR QL AUTO: ABNORMAL /HPF
BARBITURATES UR QL: NEGATIVE
BASOPHILS # BLD AUTO: 0.03 THOUSANDS/ΜL (ref 0–0.1)
BASOPHILS NFR BLD AUTO: 0 % (ref 0–1)
BENZODIAZ UR QL: NEGATIVE
BILIRUB SERPL-MCNC: 0.23 MG/DL (ref 0.2–1)
BILIRUB UR QL STRIP: NEGATIVE
BUN SERPL-MCNC: 15 MG/DL (ref 5–25)
CALCIUM SERPL-MCNC: 9.4 MG/DL (ref 8.3–10.1)
CHLORIDE SERPL-SCNC: 105 MMOL/L (ref 100–108)
CLARITY UR: CLEAR
CO2 SERPL-SCNC: 27 MMOL/L (ref 21–32)
COCAINE UR QL: NEGATIVE
COLOR UR: YELLOW
CREAT SERPL-MCNC: 0.92 MG/DL (ref 0.6–1.3)
EOSINOPHIL # BLD AUTO: 0.2 THOUSAND/ΜL (ref 0–0.61)
EOSINOPHIL NFR BLD AUTO: 2 % (ref 0–6)
ERYTHROCYTE [DISTWIDTH] IN BLOOD BY AUTOMATED COUNT: 14.6 % (ref 11.6–15.1)
GFR SERPL CREATININE-BSD FRML MDRD: 81 ML/MIN/1.73SQ M
GLUCOSE SERPL-MCNC: 97 MG/DL (ref 65–140)
GLUCOSE UR STRIP-MCNC: NEGATIVE MG/DL
HCT VFR BLD AUTO: 44.1 % (ref 36.5–49.3)
HGB BLD-MCNC: 13.8 G/DL (ref 12–17)
HGB UR QL STRIP.AUTO: NEGATIVE
HYALINE CASTS #/AREA URNS LPF: ABNORMAL /LPF
IMM GRANULOCYTES # BLD AUTO: 0.03 THOUSAND/UL (ref 0–0.2)
IMM GRANULOCYTES NFR BLD AUTO: 0 % (ref 0–2)
KETONES UR STRIP-MCNC: NEGATIVE MG/DL
LEUKOCYTE ESTERASE UR QL STRIP: NEGATIVE
LYMPHOCYTES # BLD AUTO: 2.09 THOUSANDS/ΜL (ref 0.6–4.47)
LYMPHOCYTES NFR BLD AUTO: 23 % (ref 14–44)
MCH RBC QN AUTO: 29.9 PG (ref 26.8–34.3)
MCHC RBC AUTO-ENTMCNC: 31.3 G/DL (ref 31.4–37.4)
MCV RBC AUTO: 96 FL (ref 82–98)
METHADONE UR QL: POSITIVE
MONOCYTES # BLD AUTO: 0.75 THOUSAND/ΜL (ref 0.17–1.22)
MONOCYTES NFR BLD AUTO: 8 % (ref 4–12)
NEUTROPHILS # BLD AUTO: 6.12 THOUSANDS/ΜL (ref 1.85–7.62)
NEUTS SEG NFR BLD AUTO: 67 % (ref 43–75)
NITRITE UR QL STRIP: NEGATIVE
NON-SQ EPI CELLS URNS QL MICRO: ABNORMAL /HPF
NRBC BLD AUTO-RTO: 0 /100 WBCS
OPIATES UR QL SCN: NEGATIVE
OXYCODONE+OXYMORPHONE UR QL SCN: NEGATIVE
PCP UR QL: NEGATIVE
PH UR STRIP.AUTO: 6 [PH] (ref 4.5–8)
PLATELET # BLD AUTO: 166 THOUSANDS/UL (ref 149–390)
PMV BLD AUTO: 12.9 FL (ref 8.9–12.7)
POTASSIUM SERPL-SCNC: 4.4 MMOL/L (ref 3.5–5.3)
PROT SERPL-MCNC: 8.1 G/DL (ref 6.4–8.2)
PROT UR STRIP-MCNC: ABNORMAL MG/DL
RBC # BLD AUTO: 4.62 MILLION/UL (ref 3.88–5.62)
RBC #/AREA URNS AUTO: ABNORMAL /HPF
SODIUM SERPL-SCNC: 141 MMOL/L (ref 136–145)
SP GR UR STRIP.AUTO: >=1.03 (ref 1–1.03)
THC UR QL: NEGATIVE
TROPONIN I SERPL-MCNC: <0.02 NG/ML
UROBILINOGEN UR QL STRIP.AUTO: 0.2 E.U./DL
WBC # BLD AUTO: 9.22 THOUSAND/UL (ref 4.31–10.16)
WBC #/AREA URNS AUTO: ABNORMAL /HPF

## 2021-09-22 PROCEDURE — 84484 ASSAY OF TROPONIN QUANT: CPT | Performed by: EMERGENCY MEDICINE

## 2021-09-22 PROCEDURE — 99285 EMERGENCY DEPT VISIT HI MDM: CPT | Performed by: EMERGENCY MEDICINE

## 2021-09-22 PROCEDURE — 85025 COMPLETE CBC W/AUTO DIFF WBC: CPT | Performed by: EMERGENCY MEDICINE

## 2021-09-22 PROCEDURE — 81001 URINALYSIS AUTO W/SCOPE: CPT

## 2021-09-22 PROCEDURE — 80307 DRUG TEST PRSMV CHEM ANLYZR: CPT | Performed by: EMERGENCY MEDICINE

## 2021-09-22 PROCEDURE — 36415 COLL VENOUS BLD VENIPUNCTURE: CPT | Performed by: EMERGENCY MEDICINE

## 2021-09-22 PROCEDURE — 80053 COMPREHEN METABOLIC PANEL: CPT | Performed by: EMERGENCY MEDICINE

## 2021-09-22 PROCEDURE — 96374 THER/PROPH/DIAG INJ IV PUSH: CPT

## 2021-09-22 PROCEDURE — 99285 EMERGENCY DEPT VISIT HI MDM: CPT

## 2021-09-22 PROCEDURE — 93005 ELECTROCARDIOGRAM TRACING: CPT

## 2021-09-22 RX ORDER — NALOXONE HYDROCHLORIDE 1 MG/ML
1 INJECTION PARENTERAL ONCE
Status: COMPLETED | OUTPATIENT
Start: 2021-09-22 | End: 2021-09-22

## 2021-09-22 RX ORDER — LORAZEPAM 2 MG/ML
1 INJECTION INTRAMUSCULAR ONCE
Status: COMPLETED | OUTPATIENT
Start: 2021-09-22 | End: 2021-09-22

## 2021-09-22 RX ORDER — LORAZEPAM 2 MG/ML
2 INJECTION INTRAMUSCULAR ONCE
Status: COMPLETED | OUTPATIENT
Start: 2021-09-22 | End: 2021-09-22

## 2021-09-22 RX ADMIN — LORAZEPAM 1 MG: 2 INJECTION INTRAMUSCULAR; INTRAVENOUS at 20:36

## 2021-09-22 RX ADMIN — LORAZEPAM 2 MG: 2 INJECTION INTRAMUSCULAR; INTRAVENOUS at 20:58

## 2021-09-23 ENCOUNTER — APPOINTMENT (EMERGENCY)
Dept: RADIOLOGY | Facility: HOSPITAL | Age: 76
DRG: 917 | End: 2021-09-23
Payer: COMMERCIAL

## 2021-09-23 PROBLEM — T40.1X1A HEROIN OVERDOSE (HCC): Status: ACTIVE | Noted: 2021-09-23

## 2021-09-23 LAB
ALBUMIN SERPL BCP-MCNC: 3.6 G/DL (ref 3.5–5)
ALP SERPL-CCNC: 120 U/L (ref 46–116)
ALT SERPL W P-5'-P-CCNC: 18 U/L (ref 12–78)
ANION GAP SERPL CALCULATED.3IONS-SCNC: 10 MMOL/L (ref 4–13)
APTT PPP: 28 SECONDS (ref 23–37)
AST SERPL W P-5'-P-CCNC: 21 U/L (ref 5–45)
ATRIAL RATE: 108 BPM
BASOPHILS # BLD AUTO: 0.03 THOUSANDS/ΜL (ref 0–0.1)
BASOPHILS NFR BLD AUTO: 0 % (ref 0–1)
BILIRUB SERPL-MCNC: 0.43 MG/DL (ref 0.2–1)
BILIRUB UR QL STRIP: NEGATIVE
BUN SERPL-MCNC: 16 MG/DL (ref 5–25)
CALCIUM SERPL-MCNC: 9.1 MG/DL (ref 8.3–10.1)
CHLORIDE SERPL-SCNC: 105 MMOL/L (ref 100–108)
CLARITY UR: CLEAR
CO2 SERPL-SCNC: 28 MMOL/L (ref 21–32)
COLOR UR: YELLOW
CREAT SERPL-MCNC: 0.9 MG/DL (ref 0.6–1.3)
EOSINOPHIL # BLD AUTO: 0.1 THOUSAND/ΜL (ref 0–0.61)
EOSINOPHIL NFR BLD AUTO: 1 % (ref 0–6)
ERYTHROCYTE [DISTWIDTH] IN BLOOD BY AUTOMATED COUNT: 14.7 % (ref 11.6–15.1)
GFR SERPL CREATININE-BSD FRML MDRD: 83 ML/MIN/1.73SQ M
GLUCOSE SERPL-MCNC: 73 MG/DL (ref 65–140)
GLUCOSE SERPL-MCNC: 81 MG/DL (ref 65–140)
GLUCOSE SERPL-MCNC: 85 MG/DL (ref 65–140)
GLUCOSE UR STRIP-MCNC: NEGATIVE MG/DL
HCT VFR BLD AUTO: 41.8 % (ref 36.5–49.3)
HGB BLD-MCNC: 13.1 G/DL (ref 12–17)
HGB UR QL STRIP.AUTO: NEGATIVE
IMM GRANULOCYTES # BLD AUTO: 0.03 THOUSAND/UL (ref 0–0.2)
IMM GRANULOCYTES NFR BLD AUTO: 0 % (ref 0–2)
INR PPP: 0.98 (ref 0.84–1.19)
KETONES UR STRIP-MCNC: NEGATIVE MG/DL
LACTATE SERPL-SCNC: 0.8 MMOL/L (ref 0.5–2)
LEUKOCYTE ESTERASE UR QL STRIP: NEGATIVE
LYMPHOCYTES # BLD AUTO: 1.37 THOUSANDS/ΜL (ref 0.6–4.47)
LYMPHOCYTES NFR BLD AUTO: 13 % (ref 14–44)
MCH RBC QN AUTO: 29.6 PG (ref 26.8–34.3)
MCHC RBC AUTO-ENTMCNC: 31.3 G/DL (ref 31.4–37.4)
MCV RBC AUTO: 95 FL (ref 82–98)
MONOCYTES # BLD AUTO: 0.9 THOUSAND/ΜL (ref 0.17–1.22)
MONOCYTES NFR BLD AUTO: 9 % (ref 4–12)
NEUTROPHILS # BLD AUTO: 7.91 THOUSANDS/ΜL (ref 1.85–7.62)
NEUTS SEG NFR BLD AUTO: 77 % (ref 43–75)
NITRITE UR QL STRIP: NEGATIVE
NRBC BLD AUTO-RTO: 0 /100 WBCS
PH UR STRIP.AUTO: 6 [PH]
PLATELET # BLD AUTO: 200 THOUSANDS/UL (ref 149–390)
PLATELET # BLD AUTO: 202 THOUSANDS/UL (ref 149–390)
PMV BLD AUTO: 12.2 FL (ref 8.9–12.7)
PMV BLD AUTO: 12.3 FL (ref 8.9–12.7)
POTASSIUM SERPL-SCNC: 4.5 MMOL/L (ref 3.5–5.3)
PROCALCITONIN SERPL-MCNC: <0.05 NG/ML
PROT SERPL-MCNC: 7 G/DL (ref 6.4–8.2)
PROT UR STRIP-MCNC: NEGATIVE MG/DL
PROTHROMBIN TIME: 12.7 SECONDS (ref 11.6–14.5)
QRS AXIS: 61 DEGREES
QRSD INTERVAL: 84 MS
QT INTERVAL: 330 MS
QTC INTERVAL: 403 MS
RBC # BLD AUTO: 4.42 MILLION/UL (ref 3.88–5.62)
SARS-COV-2 RNA RESP QL NAA+PROBE: NEGATIVE
SODIUM SERPL-SCNC: 143 MMOL/L (ref 136–145)
SP GR UR STRIP.AUTO: 1.02 (ref 1–1.03)
T WAVE AXIS: 65 DEGREES
UROBILINOGEN UR QL STRIP.AUTO: 0.2 E.U./DL
VENTRICULAR RATE: 90 BPM
WBC # BLD AUTO: 10.34 THOUSAND/UL (ref 4.31–10.16)

## 2021-09-23 PROCEDURE — U0005 INFEC AGEN DETEC AMPLI PROBE: HCPCS | Performed by: HOSPITALIST

## 2021-09-23 PROCEDURE — 71045 X-RAY EXAM CHEST 1 VIEW: CPT

## 2021-09-23 PROCEDURE — 93010 ELECTROCARDIOGRAM REPORT: CPT | Performed by: INTERNAL MEDICINE

## 2021-09-23 PROCEDURE — 85610 PROTHROMBIN TIME: CPT | Performed by: HOSPITALIST

## 2021-09-23 PROCEDURE — 85049 AUTOMATED PLATELET COUNT: CPT | Performed by: HOSPITALIST

## 2021-09-23 PROCEDURE — 36415 COLL VENOUS BLD VENIPUNCTURE: CPT | Performed by: HOSPITALIST

## 2021-09-23 PROCEDURE — 82948 REAGENT STRIP/BLOOD GLUCOSE: CPT

## 2021-09-23 PROCEDURE — 99223 1ST HOSP IP/OBS HIGH 75: CPT | Performed by: HOSPITALIST

## 2021-09-23 PROCEDURE — 85730 THROMBOPLASTIN TIME PARTIAL: CPT | Performed by: HOSPITALIST

## 2021-09-23 PROCEDURE — 84145 PROCALCITONIN (PCT): CPT | Performed by: HOSPITALIST

## 2021-09-23 PROCEDURE — 80053 COMPREHEN METABOLIC PANEL: CPT | Performed by: HOSPITALIST

## 2021-09-23 PROCEDURE — 83605 ASSAY OF LACTIC ACID: CPT | Performed by: HOSPITALIST

## 2021-09-23 PROCEDURE — 87040 BLOOD CULTURE FOR BACTERIA: CPT | Performed by: HOSPITALIST

## 2021-09-23 PROCEDURE — 85025 COMPLETE CBC W/AUTO DIFF WBC: CPT | Performed by: HOSPITALIST

## 2021-09-23 PROCEDURE — NC001 PR NO CHARGE: Performed by: EMERGENCY MEDICINE

## 2021-09-23 PROCEDURE — U0003 INFECTIOUS AGENT DETECTION BY NUCLEIC ACID (DNA OR RNA); SEVERE ACUTE RESPIRATORY SYNDROME CORONAVIRUS 2 (SARS-COV-2) (CORONAVIRUS DISEASE [COVID-19]), AMPLIFIED PROBE TECHNIQUE, MAKING USE OF HIGH THROUGHPUT TECHNOLOGIES AS DESCRIBED BY CMS-2020-01-R: HCPCS | Performed by: HOSPITALIST

## 2021-09-23 PROCEDURE — 81003 URINALYSIS AUTO W/O SCOPE: CPT | Performed by: HOSPITALIST

## 2021-09-23 RX ORDER — CLONIDINE HYDROCHLORIDE 0.1 MG/1
0.1 TABLET ORAL EVERY 12 HOURS SCHEDULED
Status: DISCONTINUED | OUTPATIENT
Start: 2021-09-23 | End: 2021-09-23

## 2021-09-23 RX ORDER — HYDRALAZINE HYDROCHLORIDE 20 MG/ML
10 INJECTION INTRAMUSCULAR; INTRAVENOUS EVERY 6 HOURS PRN
Status: DISCONTINUED | OUTPATIENT
Start: 2021-09-23 | End: 2021-09-28 | Stop reason: HOSPADM

## 2021-09-23 RX ORDER — CYCLOBENZAPRINE HCL 10 MG
10 TABLET ORAL 3 TIMES DAILY
Status: DISCONTINUED | OUTPATIENT
Start: 2021-09-23 | End: 2021-09-23

## 2021-09-23 RX ORDER — CYCLOBENZAPRINE HCL 10 MG
10 TABLET ORAL 3 TIMES DAILY
Status: DISCONTINUED | OUTPATIENT
Start: 2021-09-24 | End: 2021-09-28 | Stop reason: HOSPADM

## 2021-09-23 RX ORDER — HYDROXYZINE HYDROCHLORIDE 25 MG/1
25 TABLET, FILM COATED ORAL EVERY 6 HOURS PRN
Status: DISCONTINUED | OUTPATIENT
Start: 2021-09-23 | End: 2021-09-28 | Stop reason: HOSPADM

## 2021-09-23 RX ORDER — ONDANSETRON 2 MG/ML
4 INJECTION INTRAMUSCULAR; INTRAVENOUS EVERY 6 HOURS PRN
Status: DISCONTINUED | OUTPATIENT
Start: 2021-09-23 | End: 2021-09-28 | Stop reason: HOSPADM

## 2021-09-23 RX ORDER — DIAZEPAM 5 MG/ML
2.5 INJECTION, SOLUTION INTRAMUSCULAR; INTRAVENOUS EVERY 6 HOURS PRN
Status: DISCONTINUED | OUTPATIENT
Start: 2021-09-23 | End: 2021-09-27

## 2021-09-23 RX ORDER — CLONIDINE 0.1 MG/24H
0.1 PATCH, EXTENDED RELEASE TRANSDERMAL WEEKLY
Status: DISCONTINUED | OUTPATIENT
Start: 2021-09-23 | End: 2021-09-28 | Stop reason: HOSPADM

## 2021-09-23 RX ORDER — CLONIDINE 0.1 MG/24H
0.1 PATCH, EXTENDED RELEASE TRANSDERMAL WEEKLY
Status: DISCONTINUED | OUTPATIENT
Start: 2021-09-23 | End: 2021-09-23

## 2021-09-23 RX ORDER — NICOTINE 21 MG/24HR
1 PATCH, TRANSDERMAL 24 HOURS TRANSDERMAL DAILY
Status: DISCONTINUED | OUTPATIENT
Start: 2021-09-23 | End: 2021-09-28 | Stop reason: HOSPADM

## 2021-09-23 RX ORDER — ACETAMINOPHEN 650 MG/1
325 SUPPOSITORY RECTAL EVERY 4 HOURS PRN
Status: DISCONTINUED | OUTPATIENT
Start: 2021-09-23 | End: 2021-09-27

## 2021-09-23 RX ORDER — DIAZEPAM 5 MG/ML
2.5 INJECTION, SOLUTION INTRAMUSCULAR; INTRAVENOUS ONCE
Status: COMPLETED | OUTPATIENT
Start: 2021-09-23 | End: 2021-09-23

## 2021-09-23 RX ORDER — TRAZODONE HYDROCHLORIDE 50 MG/1
50 TABLET ORAL
Status: DISCONTINUED | OUTPATIENT
Start: 2021-09-23 | End: 2021-09-28 | Stop reason: HOSPADM

## 2021-09-23 RX ORDER — SODIUM CHLORIDE 9 MG/ML
75 INJECTION, SOLUTION INTRAVENOUS CONTINUOUS
Status: DISCONTINUED | OUTPATIENT
Start: 2021-09-23 | End: 2021-09-24

## 2021-09-23 RX ORDER — HALOPERIDOL 5 MG/ML
1 INJECTION INTRAMUSCULAR ONCE
Status: COMPLETED | OUTPATIENT
Start: 2021-09-23 | End: 2021-09-24

## 2021-09-23 RX ORDER — SODIUM CHLORIDE 9 MG/ML
3 INJECTION INTRAVENOUS
Status: DISCONTINUED | OUTPATIENT
Start: 2021-09-23 | End: 2021-09-28 | Stop reason: HOSPADM

## 2021-09-23 RX ADMIN — SODIUM CHLORIDE 75 ML/HR: 0.9 INJECTION, SOLUTION INTRAVENOUS at 13:29

## 2021-09-23 RX ADMIN — HYDRALAZINE HYDROCHLORIDE 10 MG: 20 INJECTION INTRAMUSCULAR; INTRAVENOUS at 13:20

## 2021-09-23 RX ADMIN — HYDRALAZINE HYDROCHLORIDE 10 MG: 20 INJECTION INTRAMUSCULAR; INTRAVENOUS at 20:43

## 2021-09-23 RX ADMIN — ENOXAPARIN SODIUM 40 MG: 40 INJECTION SUBCUTANEOUS at 14:22

## 2021-09-23 RX ADMIN — SODIUM CHLORIDE, SODIUM LACTATE, POTASSIUM CHLORIDE, AND CALCIUM CHLORIDE 1000 ML: .6; .31; .03; .02 INJECTION, SOLUTION INTRAVENOUS at 14:42

## 2021-09-23 RX ADMIN — DIAZEPAM 2.5 MG: 5 INJECTION, SOLUTION INTRAMUSCULAR; INTRAVENOUS at 21:33

## 2021-09-23 RX ADMIN — ACETAMINOPHEN 325 MG: 325 SUPPOSITORY RECTAL at 14:45

## 2021-09-23 RX ADMIN — SODIUM CHLORIDE, SODIUM LACTATE, POTASSIUM CHLORIDE, AND CALCIUM CHLORIDE 1000 ML: .6; .31; .03; .02 INJECTION, SOLUTION INTRAVENOUS at 14:59

## 2021-09-23 RX ADMIN — CLONIDINE 0.1 MG: 0.1 PATCH TRANSDERMAL at 20:51

## 2021-09-23 RX ADMIN — SODIUM CHLORIDE, SODIUM LACTATE, POTASSIUM CHLORIDE, AND CALCIUM CHLORIDE 1000 ML: .6; .31; .03; .02 INJECTION, SOLUTION INTRAVENOUS at 16:07

## 2021-09-23 RX ADMIN — CLONIDINE 0.1 MG: 0.1 PATCH TRANSDERMAL at 14:46

## 2021-09-23 RX ADMIN — CEFEPIME HYDROCHLORIDE 2000 MG: 2 INJECTION, POWDER, FOR SOLUTION INTRAVENOUS at 14:42

## 2021-09-23 RX ADMIN — Medication 1 PATCH: at 14:19

## 2021-09-24 ENCOUNTER — APPOINTMENT (INPATIENT)
Dept: RADIOLOGY | Facility: HOSPITAL | Age: 76
DRG: 917 | End: 2021-09-24
Payer: COMMERCIAL

## 2021-09-24 PROBLEM — G93.41 ENCEPHALOPATHY, METABOLIC: Status: ACTIVE | Noted: 2021-09-24

## 2021-09-24 PROBLEM — M25.512 LEFT SHOULDER PAIN: Status: ACTIVE | Noted: 2021-09-24

## 2021-09-24 PROBLEM — J44.9 COPD (CHRONIC OBSTRUCTIVE PULMONARY DISEASE) (HCC): Status: ACTIVE | Noted: 2021-09-24

## 2021-09-24 PROBLEM — J96.01 ACUTE RESPIRATORY FAILURE WITH HYPOXIA (HCC): Status: ACTIVE | Noted: 2021-09-24

## 2021-09-24 LAB
ANION GAP SERPL CALCULATED.3IONS-SCNC: 9 MMOL/L (ref 4–13)
BASOPHILS # BLD AUTO: 0.06 THOUSANDS/ΜL (ref 0–0.1)
BASOPHILS NFR BLD AUTO: 0 % (ref 0–1)
BUN SERPL-MCNC: 13 MG/DL (ref 5–25)
CALCIUM SERPL-MCNC: 9.3 MG/DL (ref 8.3–10.1)
CHLORIDE SERPL-SCNC: 102 MMOL/L (ref 100–108)
CO2 SERPL-SCNC: 29 MMOL/L (ref 21–32)
CREAT SERPL-MCNC: 0.75 MG/DL (ref 0.6–1.3)
EOSINOPHIL # BLD AUTO: 0.01 THOUSAND/ΜL (ref 0–0.61)
EOSINOPHIL NFR BLD AUTO: 0 % (ref 0–6)
ERYTHROCYTE [DISTWIDTH] IN BLOOD BY AUTOMATED COUNT: 14.9 % (ref 11.6–15.1)
GFR SERPL CREATININE-BSD FRML MDRD: 90 ML/MIN/1.73SQ M
GLUCOSE SERPL-MCNC: 128 MG/DL (ref 65–140)
GLUCOSE SERPL-MCNC: 174 MG/DL (ref 65–140)
GLUCOSE SERPL-MCNC: 71 MG/DL (ref 65–140)
HAV IGM SER QL: NORMAL
HBV CORE IGM SER QL: NORMAL
HBV SURFACE AG SER QL: NORMAL
HCT VFR BLD AUTO: 42.8 % (ref 36.5–49.3)
HCV AB SER QL: NORMAL
HGB BLD-MCNC: 13.3 G/DL (ref 12–17)
HIV 1+2 AB+HIV1 P24 AG SERPL QL IA: NORMAL
HIV1 P24 AG SER QL: NORMAL
IMM GRANULOCYTES # BLD AUTO: 0.05 THOUSAND/UL (ref 0–0.2)
IMM GRANULOCYTES NFR BLD AUTO: 0 % (ref 0–2)
LYMPHOCYTES # BLD AUTO: 1.15 THOUSANDS/ΜL (ref 0.6–4.47)
LYMPHOCYTES NFR BLD AUTO: 8 % (ref 14–44)
MAGNESIUM SERPL-MCNC: 1.8 MG/DL (ref 1.6–2.6)
MCH RBC QN AUTO: 29.3 PG (ref 26.8–34.3)
MCHC RBC AUTO-ENTMCNC: 31.1 G/DL (ref 31.4–37.4)
MCV RBC AUTO: 94 FL (ref 82–98)
MONOCYTES # BLD AUTO: 1.09 THOUSAND/ΜL (ref 0.17–1.22)
MONOCYTES NFR BLD AUTO: 8 % (ref 4–12)
NEUTROPHILS # BLD AUTO: 11.44 THOUSANDS/ΜL (ref 1.85–7.62)
NEUTS SEG NFR BLD AUTO: 84 % (ref 43–75)
NRBC BLD AUTO-RTO: 0 /100 WBCS
PLATELET # BLD AUTO: 193 THOUSANDS/UL (ref 149–390)
PMV BLD AUTO: 12.4 FL (ref 8.9–12.7)
POTASSIUM SERPL-SCNC: 4.1 MMOL/L (ref 3.5–5.3)
PROCALCITONIN SERPL-MCNC: 0.34 NG/ML
RBC # BLD AUTO: 4.54 MILLION/UL (ref 3.88–5.62)
SODIUM SERPL-SCNC: 140 MMOL/L (ref 136–145)
WBC # BLD AUTO: 13.8 THOUSAND/UL (ref 4.31–10.16)

## 2021-09-24 PROCEDURE — 80074 ACUTE HEPATITIS PANEL: CPT | Performed by: PHYSICIAN ASSISTANT

## 2021-09-24 PROCEDURE — 83735 ASSAY OF MAGNESIUM: CPT | Performed by: HOSPITALIST

## 2021-09-24 PROCEDURE — 82948 REAGENT STRIP/BLOOD GLUCOSE: CPT

## 2021-09-24 PROCEDURE — 80048 BASIC METABOLIC PNL TOTAL CA: CPT | Performed by: HOSPITALIST

## 2021-09-24 PROCEDURE — 84145 PROCALCITONIN (PCT): CPT | Performed by: HOSPITALIST

## 2021-09-24 PROCEDURE — 99232 SBSQ HOSP IP/OBS MODERATE 35: CPT | Performed by: INTERNAL MEDICINE

## 2021-09-24 PROCEDURE — 85025 COMPLETE CBC W/AUTO DIFF WBC: CPT | Performed by: HOSPITALIST

## 2021-09-24 PROCEDURE — 87806 HIV AG W/HIV1&2 ANTB W/OPTIC: CPT | Performed by: PHYSICIAN ASSISTANT

## 2021-09-24 PROCEDURE — 73030 X-RAY EXAM OF SHOULDER: CPT

## 2021-09-24 RX ORDER — OMEPRAZOLE 20 MG/1
20 CAPSULE, DELAYED RELEASE ORAL DAILY
COMMUNITY

## 2021-09-24 RX ORDER — PANTOPRAZOLE SODIUM 40 MG/1
40 TABLET, DELAYED RELEASE ORAL
Status: DISCONTINUED | OUTPATIENT
Start: 2021-09-25 | End: 2021-09-28 | Stop reason: HOSPADM

## 2021-09-24 RX ORDER — ATORVASTATIN CALCIUM 80 MG/1
80 TABLET, FILM COATED ORAL DAILY
COMMUNITY

## 2021-09-24 RX ORDER — HALOPERIDOL 5 MG/ML
2 INJECTION INTRAMUSCULAR ONCE
Status: DISCONTINUED | OUTPATIENT
Start: 2021-09-24 | End: 2021-09-28 | Stop reason: HOSPADM

## 2021-09-24 RX ORDER — AMLODIPINE BESYLATE 5 MG/1
5 TABLET ORAL DAILY
COMMUNITY

## 2021-09-24 RX ORDER — FUROSEMIDE 10 MG/ML
20 INJECTION INTRAMUSCULAR; INTRAVENOUS ONCE
Status: COMPLETED | OUTPATIENT
Start: 2021-09-24 | End: 2021-09-24

## 2021-09-24 RX ORDER — AMLODIPINE BESYLATE 5 MG/1
5 TABLET ORAL DAILY
Status: DISCONTINUED | OUTPATIENT
Start: 2021-09-24 | End: 2021-09-28 | Stop reason: HOSPADM

## 2021-09-24 RX ORDER — ATORVASTATIN CALCIUM 80 MG/1
80 TABLET, FILM COATED ORAL
Status: DISCONTINUED | OUTPATIENT
Start: 2021-09-24 | End: 2021-09-28 | Stop reason: HOSPADM

## 2021-09-24 RX ORDER — BUPROPION HYDROCHLORIDE 150 MG/1
150 TABLET, EXTENDED RELEASE ORAL 2 TIMES DAILY
COMMUNITY

## 2021-09-24 RX ADMIN — CYCLOBENZAPRINE HYDROCHLORIDE 10 MG: 10 TABLET, FILM COATED ORAL at 21:45

## 2021-09-24 RX ADMIN — FUROSEMIDE 20 MG: 10 INJECTION, SOLUTION INTRAVENOUS at 09:13

## 2021-09-24 RX ADMIN — INSULIN LISPRO 1 UNITS: 100 INJECTION, SOLUTION INTRAVENOUS; SUBCUTANEOUS at 17:36

## 2021-09-24 RX ADMIN — AMLODIPINE BESYLATE 5 MG: 5 TABLET ORAL at 17:30

## 2021-09-24 RX ADMIN — Medication 1 PATCH: at 09:14

## 2021-09-24 RX ADMIN — ATORVASTATIN CALCIUM 80 MG: 80 TABLET, FILM COATED ORAL at 17:30

## 2021-09-24 RX ADMIN — DIAZEPAM 2.5 MG: 5 INJECTION, SOLUTION INTRAMUSCULAR; INTRAVENOUS at 02:40

## 2021-09-24 RX ADMIN — CYCLOBENZAPRINE HYDROCHLORIDE 10 MG: 10 TABLET, FILM COATED ORAL at 17:30

## 2021-09-24 RX ADMIN — HALOPERIDOL LACTATE 1 MG: 5 INJECTION, SOLUTION INTRAMUSCULAR at 00:01

## 2021-09-24 RX ADMIN — ENOXAPARIN SODIUM 40 MG: 40 INJECTION SUBCUTANEOUS at 09:14

## 2021-09-25 LAB
ANION GAP SERPL CALCULATED.3IONS-SCNC: 7 MMOL/L (ref 4–13)
BASOPHILS # BLD AUTO: 0.02 THOUSANDS/ΜL (ref 0–0.1)
BASOPHILS NFR BLD AUTO: 0 % (ref 0–1)
BUN SERPL-MCNC: 17 MG/DL (ref 5–25)
CALCIUM SERPL-MCNC: 8.9 MG/DL (ref 8.3–10.1)
CHLORIDE SERPL-SCNC: 99 MMOL/L (ref 100–108)
CO2 SERPL-SCNC: 31 MMOL/L (ref 21–32)
CREAT SERPL-MCNC: 0.84 MG/DL (ref 0.6–1.3)
EOSINOPHIL # BLD AUTO: 0.23 THOUSAND/ΜL (ref 0–0.61)
EOSINOPHIL NFR BLD AUTO: 2 % (ref 0–6)
ERYTHROCYTE [DISTWIDTH] IN BLOOD BY AUTOMATED COUNT: 14.4 % (ref 11.6–15.1)
GFR SERPL CREATININE-BSD FRML MDRD: 86 ML/MIN/1.73SQ M
GLUCOSE SERPL-MCNC: 121 MG/DL (ref 65–140)
GLUCOSE SERPL-MCNC: 121 MG/DL (ref 65–140)
GLUCOSE SERPL-MCNC: 160 MG/DL (ref 65–140)
GLUCOSE SERPL-MCNC: 213 MG/DL (ref 65–140)
GLUCOSE SERPL-MCNC: 215 MG/DL (ref 65–140)
HCT VFR BLD AUTO: 39.2 % (ref 36.5–49.3)
HGB BLD-MCNC: 12.5 G/DL (ref 12–17)
IMM GRANULOCYTES # BLD AUTO: 0.06 THOUSAND/UL (ref 0–0.2)
IMM GRANULOCYTES NFR BLD AUTO: 1 % (ref 0–2)
LYMPHOCYTES # BLD AUTO: 2.07 THOUSANDS/ΜL (ref 0.6–4.47)
LYMPHOCYTES NFR BLD AUTO: 19 % (ref 14–44)
MCH RBC QN AUTO: 29.8 PG (ref 26.8–34.3)
MCHC RBC AUTO-ENTMCNC: 31.9 G/DL (ref 31.4–37.4)
MCV RBC AUTO: 93 FL (ref 82–98)
MONOCYTES # BLD AUTO: 1.23 THOUSAND/ΜL (ref 0.17–1.22)
MONOCYTES NFR BLD AUTO: 11 % (ref 4–12)
NEUTROPHILS # BLD AUTO: 7.58 THOUSANDS/ΜL (ref 1.85–7.62)
NEUTS SEG NFR BLD AUTO: 67 % (ref 43–75)
NRBC BLD AUTO-RTO: 0 /100 WBCS
PLATELET # BLD AUTO: 176 THOUSANDS/UL (ref 149–390)
PMV BLD AUTO: 12.2 FL (ref 8.9–12.7)
POTASSIUM SERPL-SCNC: 3.7 MMOL/L (ref 3.5–5.3)
RBC # BLD AUTO: 4.2 MILLION/UL (ref 3.88–5.62)
SODIUM SERPL-SCNC: 137 MMOL/L (ref 136–145)
WBC # BLD AUTO: 11.19 THOUSAND/UL (ref 4.31–10.16)

## 2021-09-25 PROCEDURE — 94760 N-INVAS EAR/PLS OXIMETRY 1: CPT

## 2021-09-25 PROCEDURE — 82948 REAGENT STRIP/BLOOD GLUCOSE: CPT

## 2021-09-25 PROCEDURE — 85025 COMPLETE CBC W/AUTO DIFF WBC: CPT | Performed by: INTERNAL MEDICINE

## 2021-09-25 PROCEDURE — 99232 SBSQ HOSP IP/OBS MODERATE 35: CPT | Performed by: INTERNAL MEDICINE

## 2021-09-25 PROCEDURE — 80048 BASIC METABOLIC PNL TOTAL CA: CPT | Performed by: INTERNAL MEDICINE

## 2021-09-25 PROCEDURE — 94640 AIRWAY INHALATION TREATMENT: CPT

## 2021-09-25 PROCEDURE — 94664 DEMO&/EVAL PT USE INHALER: CPT

## 2021-09-25 RX ORDER — BENZONATATE 100 MG/1
100 CAPSULE ORAL 3 TIMES DAILY PRN
Status: DISCONTINUED | OUTPATIENT
Start: 2021-09-25 | End: 2021-09-28 | Stop reason: HOSPADM

## 2021-09-25 RX ORDER — METHADONE HYDROCHLORIDE 10 MG/1
50 TABLET ORAL DAILY
Status: DISCONTINUED | OUTPATIENT
Start: 2021-09-25 | End: 2021-09-26

## 2021-09-25 RX ORDER — TRAMADOL HYDROCHLORIDE 50 MG/1
50 TABLET ORAL ONCE AS NEEDED
Status: COMPLETED | OUTPATIENT
Start: 2021-09-25 | End: 2021-09-25

## 2021-09-25 RX ORDER — LEVALBUTEROL 1.25 MG/.5ML
1.25 SOLUTION, CONCENTRATE RESPIRATORY (INHALATION) EVERY 6 HOURS PRN
Status: DISCONTINUED | OUTPATIENT
Start: 2021-09-25 | End: 2021-09-27

## 2021-09-25 RX ADMIN — ENOXAPARIN SODIUM 40 MG: 40 INJECTION SUBCUTANEOUS at 08:36

## 2021-09-25 RX ADMIN — HYDROXYZINE HYDROCHLORIDE 25 MG: 25 TABLET ORAL at 22:14

## 2021-09-25 RX ADMIN — CYCLOBENZAPRINE HYDROCHLORIDE 10 MG: 10 TABLET, FILM COATED ORAL at 08:36

## 2021-09-25 RX ADMIN — BENZONATATE 100 MG: 100 CAPSULE ORAL at 22:13

## 2021-09-25 RX ADMIN — AMLODIPINE BESYLATE 5 MG: 5 TABLET ORAL at 08:36

## 2021-09-25 RX ADMIN — TRAZODONE HYDROCHLORIDE 50 MG: 50 TABLET ORAL at 02:06

## 2021-09-25 RX ADMIN — METHADONE HYDROCHLORIDE 50 MG: 10 TABLET ORAL at 16:25

## 2021-09-25 RX ADMIN — Medication 1 PATCH: at 08:37

## 2021-09-25 RX ADMIN — PANTOPRAZOLE SODIUM 40 MG: 40 TABLET, DELAYED RELEASE ORAL at 05:17

## 2021-09-25 RX ADMIN — BENZONATATE 100 MG: 100 CAPSULE ORAL at 05:22

## 2021-09-25 RX ADMIN — ATORVASTATIN CALCIUM 80 MG: 80 TABLET, FILM COATED ORAL at 17:32

## 2021-09-25 RX ADMIN — INSULIN LISPRO 1 UNITS: 100 INJECTION, SOLUTION INTRAVENOUS; SUBCUTANEOUS at 17:31

## 2021-09-25 RX ADMIN — LEVALBUTEROL HYDROCHLORIDE 1.25 MG: 1.25 SOLUTION, CONCENTRATE RESPIRATORY (INHALATION) at 05:27

## 2021-09-25 RX ADMIN — TRAMADOL HYDROCHLORIDE 50 MG: 50 TABLET, FILM COATED ORAL at 02:57

## 2021-09-25 RX ADMIN — INSULIN LISPRO 1 UNITS: 100 INJECTION, SOLUTION INTRAVENOUS; SUBCUTANEOUS at 11:45

## 2021-09-25 RX ADMIN — CYCLOBENZAPRINE HYDROCHLORIDE 10 MG: 10 TABLET, FILM COATED ORAL at 22:14

## 2021-09-25 RX ADMIN — CYCLOBENZAPRINE HYDROCHLORIDE 10 MG: 10 TABLET, FILM COATED ORAL at 16:25

## 2021-09-25 RX ADMIN — TRAZODONE HYDROCHLORIDE 50 MG: 50 TABLET ORAL at 22:14

## 2021-09-26 ENCOUNTER — APPOINTMENT (INPATIENT)
Dept: RADIOLOGY | Facility: HOSPITAL | Age: 76
DRG: 917 | End: 2021-09-26
Payer: COMMERCIAL

## 2021-09-26 LAB
GLUCOSE SERPL-MCNC: 112 MG/DL (ref 65–140)
GLUCOSE SERPL-MCNC: 118 MG/DL (ref 65–140)
GLUCOSE SERPL-MCNC: 122 MG/DL (ref 65–140)
GLUCOSE SERPL-MCNC: 141 MG/DL (ref 65–140)

## 2021-09-26 PROCEDURE — 82948 REAGENT STRIP/BLOOD GLUCOSE: CPT

## 2021-09-26 PROCEDURE — 94762 N-INVAS EAR/PLS OXIMTRY CONT: CPT

## 2021-09-26 PROCEDURE — 71045 X-RAY EXAM CHEST 1 VIEW: CPT

## 2021-09-26 PROCEDURE — 99232 SBSQ HOSP IP/OBS MODERATE 35: CPT | Performed by: INTERNAL MEDICINE

## 2021-09-26 RX ORDER — METHADONE HYDROCHLORIDE 10 MG/1
10 TABLET ORAL EVERY 8 HOURS SCHEDULED
Status: DISCONTINUED | OUTPATIENT
Start: 2021-09-27 | End: 2021-09-28 | Stop reason: HOSPADM

## 2021-09-26 RX ORDER — NALOXONE HYDROCHLORIDE 0.4 MG/ML
0.2 INJECTION, SOLUTION INTRAMUSCULAR; INTRAVENOUS; SUBCUTANEOUS ONCE
Status: COMPLETED | OUTPATIENT
Start: 2021-09-26 | End: 2021-09-26

## 2021-09-26 RX ORDER — FUROSEMIDE 10 MG/ML
20 INJECTION INTRAMUSCULAR; INTRAVENOUS ONCE
Status: COMPLETED | OUTPATIENT
Start: 2021-09-26 | End: 2021-09-26

## 2021-09-26 RX ORDER — LIDOCAINE 50 MG/G
1 PATCH TOPICAL DAILY
Status: DISCONTINUED | OUTPATIENT
Start: 2021-09-26 | End: 2021-09-28 | Stop reason: HOSPADM

## 2021-09-26 RX ADMIN — FUROSEMIDE 20 MG: 10 INJECTION, SOLUTION INTRAVENOUS at 14:19

## 2021-09-26 RX ADMIN — NALXONE HYDROCHLORIDE 0.2 MG: 0.4 INJECTION INTRAMUSCULAR; INTRAVENOUS; SUBCUTANEOUS at 17:45

## 2021-09-26 RX ADMIN — LIDOCAINE 1 PATCH: 50 PATCH CUTANEOUS at 20:19

## 2021-09-26 RX ADMIN — PANTOPRAZOLE SODIUM 40 MG: 40 TABLET, DELAYED RELEASE ORAL at 06:00

## 2021-09-26 RX ADMIN — NALXONE HYDROCHLORIDE 0.2 MG: 0.4 INJECTION INTRAMUSCULAR; INTRAVENOUS; SUBCUTANEOUS at 12:40

## 2021-09-26 RX ADMIN — ENOXAPARIN SODIUM 40 MG: 40 INJECTION SUBCUTANEOUS at 10:05

## 2021-09-26 RX ADMIN — CYCLOBENZAPRINE HYDROCHLORIDE 10 MG: 10 TABLET, FILM COATED ORAL at 10:05

## 2021-09-26 RX ADMIN — Medication 1 PATCH: at 10:05

## 2021-09-26 RX ADMIN — NALXONE HYDROCHLORIDE 0.2 MG: 0.4 INJECTION INTRAMUSCULAR; INTRAVENOUS; SUBCUTANEOUS at 12:15

## 2021-09-26 RX ADMIN — AMLODIPINE BESYLATE 5 MG: 5 TABLET ORAL at 10:05

## 2021-09-26 RX ADMIN — METHADONE HYDROCHLORIDE 50 MG: 10 TABLET ORAL at 10:05

## 2021-09-26 RX ADMIN — CYCLOBENZAPRINE HYDROCHLORIDE 10 MG: 10 TABLET, FILM COATED ORAL at 20:18

## 2021-09-27 LAB
ALBUMIN SERPL BCP-MCNC: 2.7 G/DL (ref 3.5–5)
ALP SERPL-CCNC: 103 U/L (ref 46–116)
ALT SERPL W P-5'-P-CCNC: 32 U/L (ref 12–78)
ANION GAP SERPL CALCULATED.3IONS-SCNC: 7 MMOL/L (ref 4–13)
AST SERPL W P-5'-P-CCNC: 24 U/L (ref 5–45)
BASOPHILS # BLD AUTO: 0.04 THOUSANDS/ΜL (ref 0–0.1)
BASOPHILS NFR BLD AUTO: 0 % (ref 0–1)
BILIRUB SERPL-MCNC: 0.62 MG/DL (ref 0.2–1)
BUN SERPL-MCNC: 22 MG/DL (ref 5–25)
CALCIUM ALBUM COR SERPL-MCNC: 10.6 MG/DL (ref 8.3–10.1)
CALCIUM SERPL-MCNC: 9.6 MG/DL (ref 8.3–10.1)
CHLORIDE SERPL-SCNC: 100 MMOL/L (ref 100–108)
CO2 SERPL-SCNC: 33 MMOL/L (ref 21–32)
CREAT SERPL-MCNC: 0.96 MG/DL (ref 0.6–1.3)
EOSINOPHIL # BLD AUTO: 0.17 THOUSAND/ΜL (ref 0–0.61)
EOSINOPHIL NFR BLD AUTO: 2 % (ref 0–6)
ERYTHROCYTE [DISTWIDTH] IN BLOOD BY AUTOMATED COUNT: 14.3 % (ref 11.6–15.1)
GFR SERPL CREATININE-BSD FRML MDRD: 77 ML/MIN/1.73SQ M
GLUCOSE SERPL-MCNC: 105 MG/DL (ref 65–140)
GLUCOSE SERPL-MCNC: 148 MG/DL (ref 65–140)
GLUCOSE SERPL-MCNC: 155 MG/DL (ref 65–140)
GLUCOSE SERPL-MCNC: 156 MG/DL (ref 65–140)
GLUCOSE SERPL-MCNC: 223 MG/DL (ref 65–140)
HCT VFR BLD AUTO: 42.5 % (ref 36.5–49.3)
HGB BLD-MCNC: 13.4 G/DL (ref 12–17)
IMM GRANULOCYTES # BLD AUTO: 0.04 THOUSAND/UL (ref 0–0.2)
IMM GRANULOCYTES NFR BLD AUTO: 0 % (ref 0–2)
LYMPHOCYTES # BLD AUTO: 1.96 THOUSANDS/ΜL (ref 0.6–4.47)
LYMPHOCYTES NFR BLD AUTO: 19 % (ref 14–44)
MCH RBC QN AUTO: 29.7 PG (ref 26.8–34.3)
MCHC RBC AUTO-ENTMCNC: 31.5 G/DL (ref 31.4–37.4)
MCV RBC AUTO: 94 FL (ref 82–98)
MONOCYTES # BLD AUTO: 1.21 THOUSAND/ΜL (ref 0.17–1.22)
MONOCYTES NFR BLD AUTO: 12 % (ref 4–12)
NEUTROPHILS # BLD AUTO: 6.98 THOUSANDS/ΜL (ref 1.85–7.62)
NEUTS SEG NFR BLD AUTO: 67 % (ref 43–75)
NRBC BLD AUTO-RTO: 0 /100 WBCS
PLATELET # BLD AUTO: 202 THOUSANDS/UL (ref 149–390)
PMV BLD AUTO: 11.8 FL (ref 8.9–12.7)
POTASSIUM SERPL-SCNC: 3.8 MMOL/L (ref 3.5–5.3)
PROT SERPL-MCNC: 7.5 G/DL (ref 6.4–8.2)
RBC # BLD AUTO: 4.51 MILLION/UL (ref 3.88–5.62)
SODIUM SERPL-SCNC: 140 MMOL/L (ref 136–145)
WBC # BLD AUTO: 10.4 THOUSAND/UL (ref 4.31–10.16)

## 2021-09-27 PROCEDURE — 80053 COMPREHEN METABOLIC PANEL: CPT | Performed by: INTERNAL MEDICINE

## 2021-09-27 PROCEDURE — 85025 COMPLETE CBC W/AUTO DIFF WBC: CPT | Performed by: INTERNAL MEDICINE

## 2021-09-27 PROCEDURE — 82948 REAGENT STRIP/BLOOD GLUCOSE: CPT

## 2021-09-27 PROCEDURE — 99232 SBSQ HOSP IP/OBS MODERATE 35: CPT | Performed by: INTERNAL MEDICINE

## 2021-09-27 RX ORDER — ACETAMINOPHEN 325 MG/1
650 TABLET ORAL EVERY 6 HOURS PRN
Status: DISCONTINUED | OUTPATIENT
Start: 2021-09-27 | End: 2021-09-28 | Stop reason: HOSPADM

## 2021-09-27 RX ORDER — LEVALBUTEROL 1.25 MG/.5ML
1.25 SOLUTION, CONCENTRATE RESPIRATORY (INHALATION)
Status: DISCONTINUED | OUTPATIENT
Start: 2021-09-27 | End: 2021-09-27

## 2021-09-27 RX ORDER — LEVALBUTEROL 1.25 MG/.5ML
1.25 SOLUTION, CONCENTRATE RESPIRATORY (INHALATION) EVERY 6 HOURS PRN
Status: DISCONTINUED | OUTPATIENT
Start: 2021-09-27 | End: 2021-09-28 | Stop reason: HOSPADM

## 2021-09-27 RX ADMIN — CYCLOBENZAPRINE HYDROCHLORIDE 10 MG: 10 TABLET, FILM COATED ORAL at 09:54

## 2021-09-27 RX ADMIN — ATORVASTATIN CALCIUM 80 MG: 80 TABLET, FILM COATED ORAL at 17:12

## 2021-09-27 RX ADMIN — PANTOPRAZOLE SODIUM 40 MG: 40 TABLET, DELAYED RELEASE ORAL at 06:15

## 2021-09-27 RX ADMIN — METHADONE HYDROCHLORIDE 10 MG: 10 TABLET ORAL at 21:36

## 2021-09-27 RX ADMIN — INSULIN LISPRO 1 UNITS: 100 INJECTION, SOLUTION INTRAVENOUS; SUBCUTANEOUS at 09:55

## 2021-09-27 RX ADMIN — AMLODIPINE BESYLATE 5 MG: 5 TABLET ORAL at 09:54

## 2021-09-27 RX ADMIN — Medication 1 PATCH: at 09:58

## 2021-09-27 RX ADMIN — CYCLOBENZAPRINE HYDROCHLORIDE 10 MG: 10 TABLET, FILM COATED ORAL at 17:11

## 2021-09-27 RX ADMIN — CYCLOBENZAPRINE HYDROCHLORIDE 10 MG: 10 TABLET, FILM COATED ORAL at 21:36

## 2021-09-27 RX ADMIN — ENOXAPARIN SODIUM 40 MG: 40 INJECTION SUBCUTANEOUS at 09:57

## 2021-09-27 RX ADMIN — INSULIN LISPRO 1 UNITS: 100 INJECTION, SOLUTION INTRAVENOUS; SUBCUTANEOUS at 12:34

## 2021-09-27 RX ADMIN — METHADONE HYDROCHLORIDE 10 MG: 10 TABLET ORAL at 06:15

## 2021-09-27 RX ADMIN — LIDOCAINE 1 PATCH: 50 PATCH CUTANEOUS at 21:39

## 2021-09-27 RX ADMIN — METHADONE HYDROCHLORIDE 10 MG: 10 TABLET ORAL at 14:42

## 2021-09-27 RX ADMIN — ACETAMINOPHEN 650 MG: 325 TABLET, FILM COATED ORAL at 12:50

## 2021-09-28 VITALS
BODY MASS INDEX: 30.82 KG/M2 | WEIGHT: 185 LBS | OXYGEN SATURATION: 94 % | DIASTOLIC BLOOD PRESSURE: 92 MMHG | TEMPERATURE: 98.3 F | SYSTOLIC BLOOD PRESSURE: 167 MMHG | HEART RATE: 85 BPM | HEIGHT: 65 IN | RESPIRATION RATE: 18 BRPM

## 2021-09-28 LAB
BACTERIA BLD CULT: NORMAL
BACTERIA BLD CULT: NORMAL
GLUCOSE SERPL-MCNC: 136 MG/DL (ref 65–140)
GLUCOSE SERPL-MCNC: 139 MG/DL (ref 65–140)
GLUCOSE SERPL-MCNC: 210 MG/DL (ref 65–140)

## 2021-09-28 PROCEDURE — 94761 N-INVAS EAR/PLS OXIMETRY MLT: CPT

## 2021-09-28 PROCEDURE — 82948 REAGENT STRIP/BLOOD GLUCOSE: CPT

## 2021-09-28 PROCEDURE — 99239 HOSP IP/OBS DSCHRG MGMT >30: CPT | Performed by: INTERNAL MEDICINE

## 2021-09-28 RX ORDER — METHADONE HYDROCHLORIDE 10 MG/1
TABLET ORAL
Refills: 0
Start: 2021-09-28

## 2021-09-28 RX ORDER — NALOXONE HYDROCHLORIDE 4 MG/.1ML
SPRAY NASAL
Qty: 1 EACH | Refills: 0 | Status: SHIPPED | OUTPATIENT
Start: 2021-09-28

## 2021-09-28 RX ORDER — CLONIDINE 0.1 MG/24H
1 PATCH, EXTENDED RELEASE TRANSDERMAL WEEKLY
Qty: 4 PATCH | Refills: 0 | Status: SHIPPED | OUTPATIENT
Start: 2021-09-30

## 2021-09-28 RX ADMIN — BENZONATATE 100 MG: 100 CAPSULE ORAL at 15:22

## 2021-09-28 RX ADMIN — CYCLOBENZAPRINE HYDROCHLORIDE 10 MG: 10 TABLET, FILM COATED ORAL at 17:19

## 2021-09-28 RX ADMIN — PANTOPRAZOLE SODIUM 40 MG: 40 TABLET, DELAYED RELEASE ORAL at 05:58

## 2021-09-28 RX ADMIN — AMLODIPINE BESYLATE 5 MG: 5 TABLET ORAL at 09:18

## 2021-09-28 RX ADMIN — ATORVASTATIN CALCIUM 80 MG: 80 TABLET, FILM COATED ORAL at 17:19

## 2021-09-28 RX ADMIN — HYDRALAZINE HYDROCHLORIDE 10 MG: 20 INJECTION INTRAMUSCULAR; INTRAVENOUS at 15:22

## 2021-09-28 RX ADMIN — ENOXAPARIN SODIUM 40 MG: 40 INJECTION SUBCUTANEOUS at 09:19

## 2021-09-28 RX ADMIN — Medication 1 PATCH: at 09:23

## 2021-09-28 RX ADMIN — INSULIN LISPRO 1 UNITS: 100 INJECTION, SOLUTION INTRAVENOUS; SUBCUTANEOUS at 12:20

## 2021-09-28 RX ADMIN — METHADONE HYDROCHLORIDE 10 MG: 10 TABLET ORAL at 15:17

## 2021-09-28 RX ADMIN — METHADONE HYDROCHLORIDE 10 MG: 10 TABLET ORAL at 05:58

## 2021-09-28 RX ADMIN — CYCLOBENZAPRINE HYDROCHLORIDE 10 MG: 10 TABLET, FILM COATED ORAL at 09:18

## 2021-11-05 ENCOUNTER — APPOINTMENT (EMERGENCY)
Dept: CT IMAGING | Facility: HOSPITAL | Age: 76
End: 2021-11-05
Payer: COMMERCIAL

## 2021-11-05 ENCOUNTER — HOSPITAL ENCOUNTER (EMERGENCY)
Facility: HOSPITAL | Age: 76
Discharge: HOME | End: 2021-11-05
Attending: EMERGENCY MEDICINE
Payer: COMMERCIAL

## 2021-11-05 ENCOUNTER — APPOINTMENT (EMERGENCY)
Dept: RADIOLOGY | Facility: HOSPITAL | Age: 76
End: 2021-11-05
Payer: COMMERCIAL

## 2021-11-05 VITALS
RESPIRATION RATE: 18 BRPM | BODY MASS INDEX: 31.62 KG/M2 | DIASTOLIC BLOOD PRESSURE: 71 MMHG | WEIGHT: 190 LBS | OXYGEN SATURATION: 95 % | TEMPERATURE: 99 F | HEART RATE: 60 BPM | SYSTOLIC BLOOD PRESSURE: 135 MMHG

## 2021-11-05 DIAGNOSIS — J44.1 COPD EXACERBATION (HCC): Primary | ICD-10-CM

## 2021-11-05 LAB
ALBUMIN SERPL BCP-MCNC: 3.5 G/DL (ref 3–5.2)
ALP SERPL-CCNC: 98 U/L (ref 43–122)
ALT SERPL W P-5'-P-CCNC: 22 U/L
AMPHETAMINES SERPL QL SCN: NEGATIVE
ANION GAP SERPL CALCULATED.3IONS-SCNC: 2 MMOL/L (ref 5–14)
AST SERPL W P-5'-P-CCNC: 29 U/L (ref 17–59)
ATRIAL RATE: 84 BPM
BARBITURATES UR QL: NEGATIVE
BASOPHILS # BLD AUTO: 0 THOUSANDS/ΜL (ref 0–0.1)
BASOPHILS NFR BLD AUTO: 0 % (ref 0–1)
BENZODIAZ UR QL: NEGATIVE
BILIRUB SERPL-MCNC: 0.41 MG/DL
BILIRUB UR QL STRIP: NEGATIVE
BUN SERPL-MCNC: 14 MG/DL (ref 5–25)
CALCIUM SERPL-MCNC: 8.9 MG/DL (ref 8.4–10.2)
CHLORIDE SERPL-SCNC: 101 MMOL/L (ref 97–108)
CLARITY UR: CLEAR
CO2 SERPL-SCNC: 32 MMOL/L (ref 22–30)
COCAINE UR QL: NEGATIVE
COLOR UR: YELLOW
CREAT SERPL-MCNC: 0.82 MG/DL (ref 0.7–1.5)
EOSINOPHIL # BLD AUTO: 0 THOUSAND/ΜL (ref 0–0.4)
EOSINOPHIL NFR BLD AUTO: 0 % (ref 0–6)
ERYTHROCYTE [DISTWIDTH] IN BLOOD BY AUTOMATED COUNT: 15.6 %
FLUAV RNA RESP QL NAA+PROBE: NEGATIVE
FLUBV RNA RESP QL NAA+PROBE: NEGATIVE
GFR SERPL CREATININE-BSD FRML MDRD: 87 ML/MIN/1.73SQ M
GLUCOSE SERPL-MCNC: 126 MG/DL (ref 70–99)
GLUCOSE UR STRIP-MCNC: NEGATIVE MG/DL
HCT VFR BLD AUTO: 36.3 % (ref 41–53)
HGB BLD-MCNC: 11.7 G/DL (ref 13.5–17.5)
HGB UR QL STRIP.AUTO: NEGATIVE
KETONES UR STRIP-MCNC: NEGATIVE MG/DL
LACTATE SERPL-SCNC: 0.8 MMOL/L (ref 0.7–2)
LEUKOCYTE ESTERASE UR QL STRIP: NEGATIVE
LIPASE SERPL-CCNC: 10 U/L (ref 23–300)
LYMPHOCYTES # BLD AUTO: 0.9 THOUSANDS/ΜL (ref 0.5–4)
LYMPHOCYTES NFR BLD AUTO: 8 % (ref 25–45)
MCH RBC QN AUTO: 29.1 PG (ref 26–34)
MCHC RBC AUTO-ENTMCNC: 32.2 G/DL (ref 31–36)
MCV RBC AUTO: 90 FL (ref 80–100)
METHADONE UR QL: POSITIVE
MONOCYTES # BLD AUTO: 0.9 THOUSAND/ΜL (ref 0.2–0.9)
MONOCYTES NFR BLD AUTO: 8 % (ref 1–10)
NEUTROPHILS # BLD AUTO: 9.2 THOUSANDS/ΜL (ref 1.8–7.8)
NEUTS SEG NFR BLD AUTO: 83 % (ref 45–65)
NITRITE UR QL STRIP: NEGATIVE
NT-PROBNP SERPL-MCNC: 171 PG/ML (ref 0–299)
OPIATES UR QL SCN: NEGATIVE
OXYCODONE+OXYMORPHONE UR QL SCN: NEGATIVE
P AXIS: 72 DEGREES
PCP UR QL: NEGATIVE
PH UR STRIP.AUTO: 6 [PH]
PLATELET # BLD AUTO: 166 THOUSANDS/UL (ref 150–450)
PMV BLD AUTO: 10.8 FL (ref 8.9–12.7)
POTASSIUM SERPL-SCNC: 4 MMOL/L (ref 3.6–5)
PR INTERVAL: 140 MS
PROT SERPL-MCNC: 6.6 G/DL (ref 5.9–8.4)
PROT UR STRIP-MCNC: NEGATIVE MG/DL
QRS AXIS: 53 DEGREES
QRSD INTERVAL: 90 MS
QT INTERVAL: 376 MS
QTC INTERVAL: 444 MS
RBC # BLD AUTO: 4.03 MILLION/UL (ref 4.5–5.9)
RSV RNA RESP QL NAA+PROBE: NEGATIVE
SARS-COV-2 RNA RESP QL NAA+PROBE: NEGATIVE
SODIUM SERPL-SCNC: 135 MMOL/L (ref 137–147)
SP GR UR STRIP.AUTO: 1.01 (ref 1–1.04)
T WAVE AXIS: 62 DEGREES
THC UR QL: NEGATIVE
TROPONIN I SERPL-MCNC: <0.01 NG/ML (ref 0–0.03)
UROBILINOGEN UA: 1 MG/DL
VENTRICULAR RATE: 84 BPM
WBC # BLD AUTO: 11.1 THOUSAND/UL (ref 4.5–11)

## 2021-11-05 PROCEDURE — 99285 EMERGENCY DEPT VISIT HI MDM: CPT | Performed by: PHYSICIAN ASSISTANT

## 2021-11-05 PROCEDURE — 84484 ASSAY OF TROPONIN QUANT: CPT | Performed by: PHYSICIAN ASSISTANT

## 2021-11-05 PROCEDURE — 72125 CT NECK SPINE W/O DYE: CPT

## 2021-11-05 PROCEDURE — 71045 X-RAY EXAM CHEST 1 VIEW: CPT

## 2021-11-05 PROCEDURE — 96375 TX/PRO/DX INJ NEW DRUG ADDON: CPT

## 2021-11-05 PROCEDURE — 93005 ELECTROCARDIOGRAM TRACING: CPT

## 2021-11-05 PROCEDURE — 80053 COMPREHEN METABOLIC PANEL: CPT | Performed by: PHYSICIAN ASSISTANT

## 2021-11-05 PROCEDURE — 85025 COMPLETE CBC W/AUTO DIFF WBC: CPT | Performed by: PHYSICIAN ASSISTANT

## 2021-11-05 PROCEDURE — 83880 ASSAY OF NATRIURETIC PEPTIDE: CPT | Performed by: PHYSICIAN ASSISTANT

## 2021-11-05 PROCEDURE — 83690 ASSAY OF LIPASE: CPT | Performed by: PHYSICIAN ASSISTANT

## 2021-11-05 PROCEDURE — 99285 EMERGENCY DEPT VISIT HI MDM: CPT

## 2021-11-05 PROCEDURE — 96374 THER/PROPH/DIAG INJ IV PUSH: CPT

## 2021-11-05 PROCEDURE — 80307 DRUG TEST PRSMV CHEM ANLYZR: CPT | Performed by: PHYSICIAN ASSISTANT

## 2021-11-05 PROCEDURE — 36415 COLL VENOUS BLD VENIPUNCTURE: CPT | Performed by: PHYSICIAN ASSISTANT

## 2021-11-05 PROCEDURE — 70450 CT HEAD/BRAIN W/O DYE: CPT

## 2021-11-05 PROCEDURE — 83605 ASSAY OF LACTIC ACID: CPT | Performed by: PHYSICIAN ASSISTANT

## 2021-11-05 PROCEDURE — 87040 BLOOD CULTURE FOR BACTERIA: CPT | Performed by: PHYSICIAN ASSISTANT

## 2021-11-05 PROCEDURE — 93010 ELECTROCARDIOGRAM REPORT: CPT | Performed by: INTERNAL MEDICINE

## 2021-11-05 PROCEDURE — 0241U HB NFCT DS VIR RESP RNA 4 TRGT: CPT | Performed by: PHYSICIAN ASSISTANT

## 2021-11-05 RX ORDER — AZITHROMYCIN 250 MG/1
TABLET, FILM COATED ORAL
Qty: 6 TABLET | Refills: 0 | Status: SHIPPED | OUTPATIENT
Start: 2021-11-05 | End: 2021-11-09

## 2021-11-05 RX ORDER — ONDANSETRON 2 MG/ML
4 INJECTION INTRAMUSCULAR; INTRAVENOUS ONCE
Status: COMPLETED | OUTPATIENT
Start: 2021-11-05 | End: 2021-11-05

## 2021-11-05 RX ORDER — NALOXONE HYDROCHLORIDE 0.4 MG/ML
0.4 INJECTION, SOLUTION INTRAMUSCULAR; INTRAVENOUS; SUBCUTANEOUS ONCE
Status: COMPLETED | OUTPATIENT
Start: 2021-11-05 | End: 2021-11-05

## 2021-11-05 RX ORDER — ACETAMINOPHEN 500 MG
500 TABLET ORAL EVERY 6 HOURS PRN
Qty: 30 TABLET | Refills: 0 | Status: SHIPPED | OUTPATIENT
Start: 2021-11-05

## 2021-11-05 RX ORDER — ACETAMINOPHEN 325 MG/1
650 TABLET ORAL ONCE
Status: COMPLETED | OUTPATIENT
Start: 2021-11-05 | End: 2021-11-05

## 2021-11-05 RX ADMIN — ACETAMINOPHEN 650 MG: 325 TABLET ORAL at 02:26

## 2021-11-05 RX ADMIN — NALOXONE HYDROCHLORIDE 0.4 MG: 0.4 INJECTION, SOLUTION INTRAMUSCULAR; INTRAVENOUS; SUBCUTANEOUS at 01:51

## 2021-11-05 RX ADMIN — ONDANSETRON 4 MG: 2 INJECTION INTRAMUSCULAR; INTRAVENOUS at 02:01

## 2021-11-10 LAB
BACTERIA BLD CULT: NORMAL
BACTERIA BLD CULT: NORMAL

## 2021-11-16 NOTE — PROGRESS NOTES
[facility] Progress Note  Patient: Anthony Ray 68 y o  male   MRN: [id number]  PCP: Lyn Maurice MD  Unit/Bed#: [place] [address] [id number] Encounter: [id number]  Date Of Visit: 06/24/18    Assessment:  80-year-old with a known history of heroin abuse and on a methadone program presented to ED with complaint of weakness and back pain and developed deterioration during initial evaluation was found to have creatinine of 6 48 potassium 5 6 and developed altered mental status and was actually found to have 2 bags of heroin on his person and may have actually injected use during his stay in the ED he subsequently was intubated and admitted to the ICU given vigorous fluid resuscitation with gradual return to baseline of his renal function and respiratory status  Other medical issues include diabetes mellitus type 2 along with essential hypertension on Norvasc and clonidine lisinopril was held and continues to be so  Hyperkalemia has resolved after requiring insulin dextrose infusion calcium and bicarb in the ED  He presently is denying active heroin usage on presentation at the ED or even prior    And he initially refused host program initiation today    Principal Problem:    Acute kidney injury superimposed on chronic kidney disease ([facility] [address] )  Active Problems:    HTN (hypertension)    Diabetes mellitus ([facility] [address] )    Methadone use ([facility] [address] )    Opioid abuse      Plan:    · Acute kidney injury now at baseline no need for further IV fluids patient is eating and Nephrology has signed off probably from presentation of illicit opioid use with heroin and urine protein creatinine ratio was actually normal  · Hypertension will continue present usage of clonidine b i d  in the face of narcotic withdrawal and amlodipine would continue to hold lisinopril in the face of acute kidney injury  · Opioid abuse patient was found to be actively using heroin on presentation though he denies at think this raises concerns and/or qualifications for further methadone clinic follow this should be addressed with HOST program   Maintained on methadone here 90 mg  · Diabetes mellitus type 2 continue coverage scale      VTE Pharmacologic Prophylaxis:   Pharmacologic: Heparin  Mechanical VTE Prophylaxis in Place: Yes    Discussions with Specialists or Other Care Team Provider:  Now    Time Spent for Care: 30 minutes  More than 50% of total time spent on counseling and coordination of care as described above  Subjective:   Patient found asleep awakened spoke to him in [place]ian and a it is clear that the patient has little realization of his problems in relation to his heroin abuse and frankly denying what is been documented to be active heroin usage in the setting of methadone maintenance program and is not of stable discharge at present until this is rectified through HOST program which he does not seem to understand    Objective:     Vitals:   Temp (24hrs), Av 4 °F (37 4 °C), Min:99 °F (37 2 °C), Max:99 8 °F (37 7 °C)    HR:  [57-78] 57  Resp:  [20-23] 20  BP: (143-169)/(63-82) 143/63  SpO2:  [93 %-100 %] 93 %  Body mass index is 29 94 kg/m²  Input and Output Summary (last 24 hours):        Intake/Output Summary (Last 24 hours) at 18 1139  Last data filed at 18 0001   Gross per 24 hour   Intake              480 ml   Output             1750 ml   Net            -1270 ml       Physical Exam:     Physical Exam:   General appearance: alert, appears stated age and cooperative  Head: Normocephalic, without obvious abnormality, atraumatic  Lungs: clear to auscultation bilaterally  Heart: regular rate and rhythm  Abdomen: soft, non-tender; bowel sounds normal; no masses,  no organomegaly  Back: negative  Extremities: extremities normal, atraumatic, no cyanosis or edema  Neurologic: Grossly normal      Additional Data:     Labs:      Results from last 7 days  Lab Units 18  0428   WBC Thousand/uL 7 87   HEMOGLOBIN g/dL 11 4* HEMATOCRIT % 36 0*   PLATELETS Thousands/uL 117*   NEUTROS PCT % 66   LYMPHS PCT % 23   MONOS PCT % 8   EOS PCT % 3       Results from last 7 days  Lab Units 06/23/18  0428  06/21/18  1847   SODIUM mmol/L 144  < > 138   POTASSIUM mmol/L 4 3  < > 5 6*   CHLORIDE mmol/L 112*  < > 101   CO2 mmol/L 28  < > 26   BUN mg/dL 28*  < > 71*   CREATININE mg/dL 1 45*  < > 6 48*   CALCIUM mg/dL 8 9  < > 8 7   TOTAL PROTEIN g/dL  --   --  6 9   BILIRUBIN TOTAL mg/dL  --   --  0 34   ALK PHOS U/L  --   --  88   ALT U/L  --   --  23   AST U/L  --   --  19   GLUCOSE RANDOM mg/dL 96  < > 110   < > = values in this interval not displayed  * I Have Reviewed All Lab Data Listed Above  * Additional Pertinent Lab Tests Reviewed: All Labs For Current Hospital Admission Reviewed    Imaging:  Ct Abdomen Pelvis Wo Contrast    Result Date: 6/21/2018  Narrative: CT ABDOMEN AND PELVIS WITHOUT IV CONTRAST INDICATION:   Renal failure  Flank pain  COMPARISON: None  TECHNIQUE:  CT examination of the abdomen and pelvis was performed without intravenous contrast   Axial, sagittal, and coronal 2D reformatted images were created from the source data and submitted for interpretation  Radiation dose length product (DLP) for this visit:  410 mGy-cm   This examination, like all CT scans performed in the [facility], was performed utilizing techniques to minimize radiation dose exposure, including the use of iterative reconstruction and automated exposure control  Enteric contrast was administered  FINDINGS: ABDOMEN LOWER CHEST:  There is a thin-walled air cyst at the left lung base measuring 16 mm  LIVER/BILIARY TREE:  Unremarkable  GALLBLADDER:  No calcified gallstones  No pericholecystic inflammatory change  SPLEEN:  Unremarkable  PANCREAS:  Unremarkable  ADRENAL GLANDS:  Unremarkable  KIDNEYS/URETERS:  Unremarkable  No hydronephrosis  STOMACH AND BOWEL:  A moderate amount of stool is noted throughout the colon   APPENDIX:  No findings to suggest appendicitis  ABDOMINOPELVIC CAVITY:  No ascites or free intraperitoneal air  No lymphadenopathy  VESSELS:  Unremarkable for patient's age  PELVIS REPRODUCTIVE ORGANS:  Unremarkable for patient's age  URINARY BLADDER:  Unremarkable  ABDOMINAL WALL/INGUINAL REGIONS:  There are small bilateral fat-containing inguinal hernias  There is a tiny fat-containing umbilical hernia  OSSEOUS STRUCTURES:  No acute fracture or destructive osseous lesion  Impression: No acute intra-abdominal abnormality  No free air or free fluid  No hydronephrosis or hydroureter  Moderate amount of stool noted throughout the colon  Workstation performed: [id number]     Ct Head Without Contrast    Result Date: 6/21/2018  Narrative: CT BRAIN - WITHOUT CONTRAST INDICATION:   tremor, apraxia  COMPARISON:  None  TECHNIQUE:  CT examination of the brain was performed  In addition to axial images, coronal 2D reformatted images were created and submitted for interpretation  Radiation dose length product (DLP) for this visit:  1031 mGy-cm   This examination, like all CT scans performed in the [facility], was performed utilizing techniques to minimize radiation dose exposure, including the use of iterative reconstruction and automated exposure control  IMAGE QUALITY:  Diagnostic  FINDINGS: PARENCHYMA:  No intracranial mass, mass effect or midline shift  No CT signs of acute infarction  No acute parenchymal hemorrhage  There is mild periventricular white matter low attenuation which is nonspecific and most likely related to chronic small vessel ischemic changes  VENTRICLES AND EXTRA-AXIAL SPACES:  Normal for the patient's age  VISUALIZED ORBITS AND PARANASAL SINUSES:  Unremarkable  CALVARIUM AND EXTRACRANIAL SOFT TISSUES:  Normal      Impression: No acute intracranial abnormality   Workstation performed: [id number]     Xr Chest Portable Icu    Result Date: 6/22/2018  Narrative: CHEST INDICATION:   ETT placement  COMPARISON:  None EXAM PERFORMED/VIEWS:  XR CHEST PORTABLE ICU FINDINGS:  Endotracheal tube tip is at the level of the debbie  Cardiomediastinal silhouette appears unremarkable  Pulmonary vascular congestion is present  Osseous structures appear within normal limits for patient age  Impression: Pulmonary vascular congestion  Workstation performed: [id number]     Imaging Reports Reviewed Today Include:  Chest x-ray CT abdomen pelvis reviewed  Imaging Personally Reviewed by Myself Includes:  No  Procedure: Ct Abdomen Pelvis Wo Contrast    Result Date: 6/21/2018  Narrative: CT ABDOMEN AND PELVIS WITHOUT IV CONTRAST INDICATION:   Renal failure  Flank pain  COMPARISON: None  TECHNIQUE:  CT examination of the abdomen and pelvis was performed without intravenous contrast   Axial, sagittal, and coronal 2D reformatted images were created from the source data and submitted for interpretation  Radiation dose length product (DLP) for this visit:  410 mGy-cm   This examination, like all CT scans performed in the [facility], was performed utilizing techniques to minimize radiation dose exposure, including the use of iterative reconstruction and automated exposure control  Enteric contrast was administered  FINDINGS: ABDOMEN LOWER CHEST:  There is a thin-walled air cyst at the left lung base measuring 16 mm  LIVER/BILIARY TREE:  Unremarkable  GALLBLADDER:  No calcified gallstones  No pericholecystic inflammatory change  SPLEEN:  Unremarkable  PANCREAS:  Unremarkable  ADRENAL GLANDS:  Unremarkable  KIDNEYS/URETERS:  Unremarkable  No hydronephrosis  STOMACH AND BOWEL:  A moderate amount of stool is noted throughout the colon  APPENDIX:  No findings to suggest appendicitis  ABDOMINOPELVIC CAVITY:  No ascites or free intraperitoneal air  No lymphadenopathy  VESSELS:  Unremarkable for patient's age  PELVIS REPRODUCTIVE ORGANS:  Unremarkable for patient's age  URINARY BLADDER:  Unremarkable   ABDOMINAL WALL/INGUINAL REGIONS:  There are small bilateral fat-containing inguinal hernias  There is a tiny fat-containing umbilical hernia  OSSEOUS STRUCTURES:  No acute fracture or destructive osseous lesion  Impression: No acute intra-abdominal abnormality  No free air or free fluid  No hydronephrosis or hydroureter  Moderate amount of stool noted throughout the colon  Workstation performed: [id number]     Procedure: Ct Head Without Contrast    Result Date: 6/21/2018  Narrative: CT BRAIN - WITHOUT CONTRAST INDICATION:   tremor, apraxia  COMPARISON:  None  TECHNIQUE:  CT examination of the brain was performed  In addition to axial images, coronal 2D reformatted images were created and submitted for interpretation  Radiation dose length product (DLP) for this visit:  1031 mGy-cm   This examination, like all CT scans performed in the [facility], was performed utilizing techniques to minimize radiation dose exposure, including the use of iterative reconstruction and automated exposure control  IMAGE QUALITY:  Diagnostic  FINDINGS: PARENCHYMA:  No intracranial mass, mass effect or midline shift  No CT signs of acute infarction  No acute parenchymal hemorrhage  There is mild periventricular white matter low attenuation which is nonspecific and most likely related to chronic small vessel ischemic changes  VENTRICLES AND EXTRA-AXIAL SPACES:  Normal for the patient's age  VISUALIZED ORBITS AND PARANASAL SINUSES:  Unremarkable  CALVARIUM AND EXTRACRANIAL SOFT TISSUES:  Normal      Impression: No acute intracranial abnormality  Workstation performed: [id number]     Procedure: Xr Chest Portable Icu    Result Date: 6/22/2018  Narrative: CHEST INDICATION:   ETT placement  COMPARISON:  None EXAM PERFORMED/VIEWS:  XR CHEST PORTABLE ICU FINDINGS:  Endotracheal tube tip is at the level of the debbie  Cardiomediastinal silhouette appears unremarkable  Pulmonary vascular congestion is present   Osseous structures appear within normal limits for patient age  Impression: Pulmonary vascular congestion  Workstation performed: [id number]        Recent Cultures (last 7 days):       Results from last 7 days  Lab Units 06/21/18 2018   URINE CULTURE  1047-0944 cfu/ml        Last 24 Hours Medication List:     Current Facility-Administered Medications:  acetaminophen 650 mg Oral Q6H PRN Madolyn Grade Spatzer, CRNP   albuterol 2 puff Inhalation Q4H PRN Charlane Gut, CRNP   amLODIPine 10 mg Oral Daily Madolyn Grade Spatzer, CRNP   aspirin 81 mg Oral Daily Madolyn Grade Spatzer, CRNP   atorvastatin 40 mg Oral After [employer] Spatzer, CRNP   chlorhexidine 15 mL Swish & Spit Q12H [place], CRNP   cloNIDine 0 1 mg Oral BID Madolyn Grade Spatzer, CRNP   gabapentin 300 mg Oral HS Madolyn Grade Spatzer, CRNP   heparin (porcine) 5,000 Units Subcutaneous Q8H [facility] Madolyn Grade Spatzer, CRNP   hydrALAZINE 10 mg Intravenous Q6H PRN Charlane Gut, CRNP   insulin lispro 1-5 Units Subcutaneous Q6H [facility] Madolyn Grade Spatzer, CRNP   methadone 90 mg Oral Daily Charlane Gut, CRNP   naloxone 0 04 mg Intravenous Q1MIN PRN Madolyn Grade Spatzer, CRNP   nicotine 14 mg Transdermal Daily Madolyn Grade Spatzer, CRNP   ondansetron 4 mg Intravenous Q4H PRN Madolyn Grade Spatzer, CRNP   tamsulosin 0 4 mg Oral Daily With [employer] Spatzer, CRNP   tiZANidine 4 mg Oral HS ELIESER Humphrey        Today, Patient Was Seen By: Corry Alfaro MD    ** Please Note: Dragon 360 Dictation voice to text software may have been used in the creation of this document   ** Erythromycin Counseling:  I discussed with the patient the risks of erythromycin including but not limited to GI upset, allergic reaction, drug rash, diarrhea, increase in liver enzymes, and yeast infections.

## 2022-07-11 ENCOUNTER — APPOINTMENT (OUTPATIENT)
Dept: RADIOLOGY | Age: 77
End: 2022-07-11
Payer: COMMERCIAL

## 2022-07-11 ENCOUNTER — OFFICE VISIT (OUTPATIENT)
Dept: URGENT CARE | Age: 77
End: 2022-07-11
Payer: COMMERCIAL

## 2022-07-11 VITALS
OXYGEN SATURATION: 95 % | HEART RATE: 68 BPM | HEIGHT: 65 IN | BODY MASS INDEX: 25.49 KG/M2 | RESPIRATION RATE: 20 BRPM | TEMPERATURE: 98.7 F | WEIGHT: 153 LBS

## 2022-07-11 DIAGNOSIS — Z11.59 SPECIAL SCREENING EXAMINATION FOR VIRAL DISEASE: Primary | ICD-10-CM

## 2022-07-11 DIAGNOSIS — R06.02 SHORTNESS OF BREATH: ICD-10-CM

## 2022-07-11 DIAGNOSIS — J18.9 PNEUMONIA OF RIGHT MIDDLE LOBE DUE TO INFECTIOUS ORGANISM: ICD-10-CM

## 2022-07-11 LAB
SARS-COV-2 AG UPPER RESP QL IA: NEGATIVE
VALID CONTROL: NORMAL

## 2022-07-11 PROCEDURE — 71046 X-RAY EXAM CHEST 2 VIEWS: CPT

## 2022-07-11 PROCEDURE — 99214 OFFICE O/P EST MOD 30 MIN: CPT | Performed by: PHYSICIAN ASSISTANT

## 2022-07-11 PROCEDURE — 87811 SARS-COV-2 COVID19 W/OPTIC: CPT | Performed by: PHYSICIAN ASSISTANT

## 2022-07-11 RX ORDER — LEVOFLOXACIN 750 MG/1
750 TABLET ORAL EVERY 24 HOURS
Qty: 5 TABLET | Refills: 0 | Status: SHIPPED | OUTPATIENT
Start: 2022-07-11 | End: 2022-07-16

## 2022-07-11 RX ORDER — ALBUTEROL SULFATE 90 UG/1
2 AEROSOL, METERED RESPIRATORY (INHALATION) EVERY 6 HOURS PRN
Qty: 8.5 G | Refills: 0 | Status: SHIPPED | OUTPATIENT
Start: 2022-07-11

## 2022-07-12 NOTE — PATIENT INSTRUCTIONS
Take antibiotic as prescribed  Use albuterol inhaler as instructed  If symptoms do not improve after on antibiotics for 24 hours report to the emergency room  Check your oxygen saturation 3 times a day for the next week if at any point it drops  below 92% report to the emergency room

## 2022-07-14 NOTE — PROGRESS NOTES
[facility]        NAME: Paramjit Cisneros is a 69 y o  male  : 1945    MRN: [id number]  DATE: 2022  TIME: 7:26 AM    Assessment and Plan   Special screening examination for viral disease [Z11 59]  1  Special screening examination for viral disease  Poct Covid 19 Rapid Antigen Test   2  Shortness of breath  XR chest pa & lateral   3  Pneumonia of right middle lobe due to infectious organism  levofloxacin (LEVAQUIN) 750 mg tablet    albuterol (ProAir HFA) 90 mcg/act inhaler   Pt presents with acute cough and SOB, recommend COVID testing and chest x-ray as breath sounds do sound decreased to me  Rapid COVID testing in clinic is negative  CXR demonstrates early consolidation in the right middle lobe to my read, concerning for pneumonia  Pt will be started on Levaquin x 5 days to treat secondary to cardiopulmonary comorbidity  Albuterol inhaler is refilled  Pt is provided with a pulse oximeter for home and is instructed to report to the emergency department immediately if his oxygen saturation drops to 92% or lower  He will follow-up with his primary care provider in 3-5 days  Patient Instructions     Patient Instructions   Take antibiotic as prescribed  Use albuterol inhaler as instructed  If symptoms do not improve after on antibiotics for 24 hours report to the emergency room  Check your oxygen saturation 3 times a day for the next week if at any point it drops  below 92% report to the emergency room  Follow up with PCP in 3-5 days  Proceed to  ER if symptoms worsen  Chief Complaint     Chief Complaint   Patient presents with    Cold Like Symptoms     Cough, congestion, shortness of breath, x1day         History of Present Illness       69year old male presents with complaint of cough, chest congestion, and shortness of breath that began on  (07/10/2022)   Pt denies fever, chills, runny nose, congestion, chest pain, nausea, vomiting, diarrhea, and loss of taste and smell  He denies any sick contacts and reports he is vaccinated for COVID  Pt has been using his albuterol inhaler for asthma and COPD with some relief, but it is running low  No other concerns or complaints today  Review of Systems   Review of Systems   Constitutional: Positive for fatigue  Negative for chills and fever  HENT: Positive for congestion (chest)  Negative for postnasal drip, rhinorrhea and sore throat  Respiratory: Positive for cough and shortness of breath  Cardiovascular: Negative for chest pain  Gastrointestinal: Negative for diarrhea, nausea and vomiting  Musculoskeletal: Positive for myalgias  Neurological: Negative for headaches           Current Medications       Current Outpatient Medications:     acetaminophen (TYLENOL) 500 mg tablet, Take 1 tablet (500 mg total) by mouth every 6 (six) hours as needed for moderate pain, Disp: 30 tablet, Rfl: 0    albuterol (ProAir HFA) 90 mcg/act inhaler, Inhale 2 puffs every 6 (six) hours as needed for wheezing, Disp: 8 5 g, Rfl: 0    amLODIPine (NORVASC) 5 mg tablet, Take 5 mg by mouth daily, Disp: , Rfl:     aspirin (ECOTRIN LOW STRENGTH) 81 mg EC tablet, Take 81 mg by mouth daily, Disp: , Rfl:     atorvastatin (LIPITOR) 80 mg tablet, Take 80 mg by mouth daily, Disp: , Rfl:     buPROPion (WELLBUTRIN SR) 150 mg 12 hr tablet, Take 150 mg by mouth 2 (two) times a day, Disp: , Rfl:     cloNIDine (CATAPRES-TTS-1) 0 1 mg/24 hr, Place 1 patch (0 1 mg total) on the skin once a week, Disp: 4 patch, Rfl: 0    fenofibrate (TRICOR) 145 mg tablet, Take 145 mg by mouth daily, Disp: , Rfl:     levofloxacin (LEVAQUIN) 750 mg tablet, Take 1 tablet (750 mg total) by mouth every 24 hours for 5 days, Disp: 5 tablet, Rfl: 0    metFORMIN (GLUCOPHAGE) 500 mg tablet, Take 500 mg by mouth 2 (two) times a day with meals, Disp: , Rfl:     methadone (DOLOPHINE) 10 mg tablet, Dosing per methadone clinic,, Disp: , Rfl: 0    naloxone (NARCAN) 4 mg/0 1 mL nasal spray, Administer 1 spray into a nostril  If no response after 2-3 minutes, give another dose in the other nostril using a new spray , Disp: 1 each, Rfl: 0    omeprazole (PriLOSEC) 20 mg delayed release capsule, Take 20 mg by mouth daily, Disp: , Rfl:     tamsulosin (FLOMAX) 0 4 mg, Take 0 4 mg by mouth daily with dinner, Disp: , Rfl:     nicotine (NICODERM CQ) 14 mg/24hr TD 24 hr patch, Apply a new patch every 24 hours (Patient not taking: Reported on 9/24/2021), Disp: 28 patch, Rfl: 0    Current Allergies     Allergies as of 07/11/2022    (No Known Allergies)            The following portions of the patient's history were reviewed and updated as appropriate: allergies, current medications, past family history, past medical history, past social history, past surgical history and problem list      Past Medical History:   Diagnosis Date    Asthma     [facility]     Heroin abuse ([facility] )     "20 years" snorting heroin; last use 1 week ago   Hyperlipidemia     Hypertension        History reviewed  No pertinent surgical history  History reviewed  No pertinent family history  Medications have been verified  Objective   Pulse 68   Temp 98 7 °F (37 1 °C)   Resp 20   Ht 5' 5" (1 651 m)   Wt 69 4 kg (153 lb)   SpO2 95%   BMI 25 46 kg/m²   No LMP for male patient  Physical Exam     Physical Exam  Vitals and nursing note reviewed  Constitutional:       General: He is not in acute distress  Appearance: Normal appearance  He is not ill-appearing or toxic-appearing  HENT:      Head: Normocephalic and atraumatic  Jaw: No trismus  Right Ear: Tympanic membrane, ear canal and external ear normal  There is no impacted cerumen  No foreign body  Left Ear: Tympanic membrane, ear canal and external ear normal  There is no impacted cerumen  No foreign body  Nose: No nasal deformity, mucosal edema, congestion or rhinorrhea        Right Nostril: No foreign body, epistaxis or occlusion  Left Nostril: No foreign body, epistaxis or occlusion  Right Turbinates: Not enlarged, swollen or pale  Left Turbinates: Not enlarged, swollen or pale  Mouth/Throat:      Lips: Pink  No lesions  Mouth: Mucous membranes are moist  No injury, oral lesions or angioedema  Dentition: Normal dentition  Tongue: No lesions  Tongue does not deviate from midline  Palate: No mass and lesions  Pharynx: Uvula midline  No pharyngeal swelling, oropharyngeal exudate, posterior oropharyngeal erythema or uvula swelling  Tonsils: No tonsillar exudate or tonsillar abscesses  Eyes:      General: Lids are normal  Gaze aligned appropriately  No allergic shiner  Extraocular Movements: Extraocular movements intact  Cardiovascular:      Rate and Rhythm: Normal rate and regular rhythm  Heart sounds: Normal heart sounds, S1 normal and S2 normal  Heart sounds not distant  No murmur heard  No friction rub  No gallop  Pulmonary:      Effort: Pulmonary effort is normal       Breath sounds: Examination of the right-upper field reveals wheezing  Examination of the left-upper field reveals wheezing  Examination of the right-middle field reveals wheezing  Examination of the left-middle field reveals wheezing  Examination of the right-lower field reveals decreased breath sounds  Examination of the left-lower field reveals decreased breath sounds  Decreased breath sounds and wheezing present  No rhonchi or rales  Comments: Patient speaking in full sentences with no increased respiratory effort  No audible wheezing or stridor  Lymphadenopathy:      Cervical: No cervical adenopathy  Skin:     General: Skin is warm and dry  Neurological:      Mental Status: He is alert and oriented to person, place, and time  Coordination: Coordination is intact  Gait: Gait is intact     Psychiatric:         Attention and Perception: Attention and perception normal          Mood and Affect: Mood and affect normal          Speech: Speech normal          Behavior: Behavior is cooperative  Note: Portions of this record may have been created with voice recognition software  Occasional wrong word or "sound a like" substitutions may have occurred due to the inherent limitations of voice recognition software  Please read the chart carefully and recognize, using context, where substitutions have occurred  *

## 2022-12-09 ENCOUNTER — OFFICE VISIT (OUTPATIENT)
Dept: PULMONOLOGY | Facility: CLINIC | Age: 77
End: 2022-12-09

## 2022-12-09 VITALS
RESPIRATION RATE: 18 BRPM | HEART RATE: 76 BPM | OXYGEN SATURATION: 96 % | BODY MASS INDEX: 24.32 KG/M2 | HEIGHT: 65 IN | SYSTOLIC BLOOD PRESSURE: 132 MMHG | WEIGHT: 146 LBS | DIASTOLIC BLOOD PRESSURE: 80 MMHG

## 2022-12-09 DIAGNOSIS — R06.09 DOE (DYSPNEA ON EXERTION): Primary | ICD-10-CM

## 2022-12-09 DIAGNOSIS — F17.210 NICOTINE DEPENDENCE, CIGARETTES, UNCOMPLICATED: ICD-10-CM

## 2022-12-09 DIAGNOSIS — J44.9 CHRONIC OBSTRUCTIVE PULMONARY DISEASE, UNSPECIFIED COPD TYPE (HCC): ICD-10-CM

## 2022-12-09 DIAGNOSIS — G47.33 OSA (OBSTRUCTIVE SLEEP APNEA): ICD-10-CM

## 2022-12-09 RX ORDER — FLUTICASONE FUROATE AND VILANTEROL 100; 25 UG/1; UG/1
1 POWDER RESPIRATORY (INHALATION) DAILY
Qty: 60 BLISTER | Refills: 5 | Status: SHIPPED | OUTPATIENT
Start: 2022-12-09 | End: 2023-01-08

## 2022-12-09 NOTE — PROGRESS NOTES
Pulmonary Consultation   Di Sue 69 y o  male MRN: [id number]    Encounter: [id number]      Reason for consultation:   Chronic obstructive pulmonary disease  Requesting physician:  Arias Tapia MD       Impressions:   · Dyspnea on exertion  · Chronic obstructive pulmonary disease  · Ongoing tobacco use  · Obstructive sleep apnea  Recommendations:  · Complete pulmonary function test   · CPAP titration study  · Smoking cessation  · Referral to smoking cessation clinic  · Breo Ellipta 100/25, 1 inhalation once a day  · Albuterol rescue inhaler 2 inhalations 4 times a day as needed  · Follow-up in 6 weeks  Discussion:  Patient's symptoms are consistent with chronic obstructive pulmonary disease  I had a long discussion with him regarding his smoking habit  He agreed to be seen at the smoking cessation clinic  I have started him on Breo Ellipta 100/25, 1 inhalation once a day  He will use albuterol rescue inhaler 2 elations 4 times a day as needed  I have ordered a complete pulmonary function test to determine the severity of his COPD  He has obstructive sleep apnea diagnosed at the [facility]   It is untreated  He is symptomatic  I have ordered a CPAP titration study  Based on the results we will decide the next step in his obstructive sleep apnea management  I will see him in 6 weeks and a follow-up visit  History of Present Illness   HPI:  Di Sue is a 69 y o  male who is here for evaluation of shortness of breath and cough  The patient was diagnosed with COPD years ago  He followed with pulmonary in [place]  He was seen at the [facility] and had PFTs as well as sleep study  He was diagnosed with obstructive sleep apnea  However he is not using his CPAP machine  He is on albuterol rescue inhaler 2 inhalations 4 times a day  He is still smoking about half a pack a day      Review of systems:  12 point review of systems was completed and was otherwise negative except as listed in HPI  Historical Information   Past Medical History:   Diagnosis Date   • Asthma    • Diabetes mellitus ([facility] [address] )    • Heroin abuse ([facility] [address] )     "20 years" snorting heroin; last use 1 week ago  • Hyperlipidemia    • Hypertension      History reviewed  No pertinent surgical history  History reviewed  No pertinent family history  Family History:  No family history of chronic lung disease  Social History:  Patient is  and lives with his wife  He has over 60-pack-year smoking history and continues to smoke about a half a pack a day  He has history of narcotic use and currently he is on methadone  He will also was a heavy alcohol drinker  He worked in [employer]  Meds/Allergies   No current facility-administered medications for this visit  (Not in a hospital admission)    No Known Allergies    Vitals: Blood pressure 132/80, pulse 76, resp  rate 18, height 5' 5" (1 651 m), weight 66 2 kg (146 lb), SpO2 96 %  ,      Physical exam:        Head/eyes:    Normocephalic, without obvious abnormality, atraumatic,         PERRL, extraocular muscles intact, no scleral icterus    Nose:   Nares normal, septum midline, mucosa normal, no drainage    or sinus tenderness   Throat:   Moist mucous membranes, no thrush   Neck:   Supple, trachea midline, no adenopathy; no carotid    bruit or JVD   Lungs:    Decreased breath sounds    No wheezing or rhonchi j        Heart:    Regular rate and rhythm, S1 and S2 normal, no murmur, rub   or gallop   Abdomen:     Soft, non-tender, bowel sounds active all four quadrants,     no masses, no organomegaly   Extremities:   Extremities normal, atraumatic, no cyanosis or edema   Skin:   Warm, dry, turgor normal, no rashes or lesions   Neurologic:   CNII-XII intact, normal strength, non-focal             Imaging and other studies: I have personally reviewed pertinent films in PACS chest x-rays reviewed on the PACS system  It showed no acute pulmonary findings  Lab Results   Component Value Date    WBC 11 10 (H) 11/05/2021    HGB 11 7 (L) 11/05/2021    HCT 36 3 (L) 11/05/2021    MCV 90 11/05/2021     11/05/2021     Lab Results   Component Value Date    SODIUM 135 (L) 11/05/2021    K 4 0 11/05/2021     11/05/2021    CO2 32 (H) 11/05/2021    BUN 14 11/05/2021    CREATININE 0 82 11/05/2021    GLUC 126 (H) 11/05/2021    CALCIUM 8 9 11/05/2021     His PFTs and sleep studies reports from [facility] were also reviewed          Ulysses Frances MD

## 2022-12-19 ENCOUNTER — TELEPHONE (OUTPATIENT)
Dept: SLEEP CENTER | Facility: CLINIC | Age: 77
End: 2022-12-19

## 2022-12-19 NOTE — TELEPHONE ENCOUNTER
----- Message from Niraj Roberts MD sent at 12/16/2022  3:05 PM EST -----  approved  ----- Message -----  From: Elizabeth Layton  Sent: [id number]  10:59 AM EST  To: Sleep Medicine [facility] Provider    Josiane sleep study needs approval      If approved please sign and return to clerical pool  If denied please include reasons why  Also provide alternative testing if warranted  Please sign and return to clerical pool

## 2022-12-22 ENCOUNTER — TELEPHONE (OUTPATIENT)
Dept: PULMONOLOGY | Facility: CLINIC | Age: 77
End: 2022-12-22

## 2022-12-22 NOTE — TELEPHONE ENCOUNTER
Left a voicemail for patient's granddaughter informing there has been a change in the providers schedule & we are hoping to reschedule appointment for Wednesday 12/28/22 with Deon HOBBS at our [facility] office  Please try to collect a working number for the patient  The preferred number listed for patient is not in service

## 2022-12-28 NOTE — TELEPHONE ENCOUNTER
Multiple calls made to granddaughter listed in the emergency contacts due to both numbers for [facility] & his wife not being in service   Unable to contact & left another voicemail in regards to rescheduling due to providers schedule change for patient's original appointment on 12/30/22

## 2023-03-30 ENCOUNTER — TELEPHONE (OUTPATIENT)
Dept: PULMONOLOGY | Facility: CLINIC | Age: 78
End: 2023-03-30

## 2023-03-30 NOTE — TELEPHONE ENCOUNTER
"Attempted to contact patient to schedule a 6 month follow up post testing at our [facility] office with Dr Griselda Richardson or ANJEL in June but received a message \"the wireless caller is not available  \" Mailing patient a reminder letter to schedule appointment    "

## 2023-06-12 ENCOUNTER — HOSPITAL ENCOUNTER (EMERGENCY)
Facility: HOSPITAL | Age: 78
Discharge: HOME | End: 2023-06-12
Attending: EMERGENCY MEDICINE
Payer: COMMERCIAL

## 2023-06-12 ENCOUNTER — APPOINTMENT (EMERGENCY)
Dept: CT IMAGING | Facility: HOSPITAL | Age: 78
End: 2023-06-12
Payer: COMMERCIAL

## 2023-06-12 VITALS
OXYGEN SATURATION: 98 % | TEMPERATURE: 99.1 F | HEART RATE: 72 BPM | RESPIRATION RATE: 16 BRPM | SYSTOLIC BLOOD PRESSURE: 140 MMHG | DIASTOLIC BLOOD PRESSURE: 82 MMHG | WEIGHT: 146.39 LBS | BODY MASS INDEX: 24.36 KG/M2

## 2023-06-12 DIAGNOSIS — T50.901A ACCIDENTAL OVERDOSE, INITIAL ENCOUNTER: Primary | ICD-10-CM

## 2023-06-12 LAB
2HR DELTA HS TROPONIN: 1 NG/L
ALBUMIN SERPL BCP-MCNC: 3.8 G/DL (ref 3.5–5)
ALP SERPL-CCNC: 100 U/L (ref 34–104)
ALT SERPL W P-5'-P-CCNC: 13 U/L (ref 7–52)
ANION GAP SERPL CALCULATED.3IONS-SCNC: 9 MMOL/L (ref 4–13)
AST SERPL W P-5'-P-CCNC: 15 U/L (ref 13–39)
BASOPHILS # BLD AUTO: 0.04 THOUSANDS/ÂΜL (ref 0–0.1)
BASOPHILS NFR BLD AUTO: 0 % (ref 0–1)
BILIRUB SERPL-MCNC: 0.29 MG/DL (ref 0.2–1)
BUN SERPL-MCNC: 20 MG/DL (ref 5–25)
CALCIUM SERPL-MCNC: 9.2 MG/DL (ref 8.4–10.2)
CARDIAC TROPONIN I PNL SERPL HS: 6 NG/L
CARDIAC TROPONIN I PNL SERPL HS: 7 NG/L
CHLORIDE SERPL-SCNC: 103 MMOL/L (ref 96–108)
CO2 SERPL-SCNC: 26 MMOL/L (ref 21–32)
CREAT SERPL-MCNC: 1.04 MG/DL (ref 0.6–1.3)
EOSINOPHIL # BLD AUTO: 0.23 THOUSAND/ÂΜL (ref 0–0.61)
EOSINOPHIL NFR BLD AUTO: 3 % (ref 0–6)
ERYTHROCYTE [DISTWIDTH] IN BLOOD BY AUTOMATED COUNT: 14.7 % (ref 11.6–15.1)
GFR SERPL CREATININE-BSD FRML MDRD: 68 ML/MIN/1.73SQ M
GLUCOSE SERPL-MCNC: 125 MG/DL (ref 65–140)
GLUCOSE SERPL-MCNC: 126 MG/DL (ref 65–140)
HCT VFR BLD AUTO: 41.7 % (ref 36.5–49.3)
HGB BLD-MCNC: 13.4 G/DL (ref 12–17)
IMM GRANULOCYTES # BLD AUTO: 0.02 THOUSAND/UL (ref 0–0.2)
IMM GRANULOCYTES NFR BLD AUTO: 0 % (ref 0–2)
LYMPHOCYTES # BLD AUTO: 3.02 THOUSANDS/ÂΜL (ref 0.6–4.47)
LYMPHOCYTES NFR BLD AUTO: 33 % (ref 14–44)
MCH RBC QN AUTO: 28.6 PG (ref 26.8–34.3)
MCHC RBC AUTO-ENTMCNC: 32.1 G/DL (ref 31.4–37.4)
MCV RBC AUTO: 89 FL (ref 82–98)
MONOCYTES # BLD AUTO: 0.59 THOUSAND/ÂΜL (ref 0.17–1.22)
MONOCYTES NFR BLD AUTO: 6 % (ref 4–12)
NEUTROPHILS # BLD AUTO: 5.37 THOUSANDS/ÂΜL (ref 1.85–7.62)
NEUTS SEG NFR BLD AUTO: 58 % (ref 43–75)
NRBC BLD AUTO-RTO: 0 /100 WBCS
PLATELET # BLD AUTO: 185 THOUSANDS/UL (ref 149–390)
PMV BLD AUTO: 11.2 FL (ref 8.9–12.7)
POTASSIUM SERPL-SCNC: 3.2 MMOL/L (ref 3.5–5.3)
PROT SERPL-MCNC: 6.6 G/DL (ref 6.4–8.4)
RBC # BLD AUTO: 4.68 MILLION/UL (ref 3.88–5.62)
SODIUM SERPL-SCNC: 138 MMOL/L (ref 135–147)
WBC # BLD AUTO: 9.27 THOUSAND/UL (ref 4.31–10.16)

## 2023-06-12 PROCEDURE — 93005 ELECTROCARDIOGRAM TRACING: CPT

## 2023-06-12 PROCEDURE — G1004 CDSM NDSC: HCPCS

## 2023-06-12 PROCEDURE — 85025 COMPLETE CBC W/AUTO DIFF WBC: CPT | Performed by: PHYSICIAN ASSISTANT

## 2023-06-12 PROCEDURE — 72125 CT NECK SPINE W/O DYE: CPT

## 2023-06-12 PROCEDURE — 70450 CT HEAD/BRAIN W/O DYE: CPT

## 2023-06-12 PROCEDURE — 84484 ASSAY OF TROPONIN QUANT: CPT | Performed by: PHYSICIAN ASSISTANT

## 2023-06-12 PROCEDURE — 80053 COMPREHEN METABOLIC PANEL: CPT | Performed by: PHYSICIAN ASSISTANT

## 2023-06-12 PROCEDURE — 36415 COLL VENOUS BLD VENIPUNCTURE: CPT | Performed by: PHYSICIAN ASSISTANT

## 2023-06-12 PROCEDURE — 82948 REAGENT STRIP/BLOOD GLUCOSE: CPT

## 2023-06-12 RX ORDER — NALOXONE HYDROCHLORIDE 1 MG/ML
1 INJECTION PARENTERAL ONCE
Status: COMPLETED | OUTPATIENT
Start: 2023-06-12 | End: 2023-06-12

## 2023-06-13 LAB
ATRIAL RATE: 76 BPM
P AXIS: 76 DEGREES
PR INTERVAL: 168 MS
QRS AXIS: 83 DEGREES
QRSD INTERVAL: 94 MS
QT INTERVAL: 354 MS
QTC INTERVAL: 398 MS
T WAVE AXIS: 80 DEGREES
VENTRICULAR RATE: 76 BPM

## 2023-06-13 PROCEDURE — 93010 ELECTROCARDIOGRAM REPORT: CPT | Performed by: INTERNAL MEDICINE

## 2023-06-13 NOTE — ED PROVIDER NOTES
History  Chief Complaint   Patient presents with   • Overdose - Accidental     Patient brought in by Kathleen Yost after he was found in the bathroom by family  EMS States that he uses methadone and took to much to get high  Pt is lethargic on arrival    Patient states he only took 40mg of methadone and denies any drug and alcohol use  78-year-old male with history of substance abuse currently taking methadone as well as a history of diabetes high blood pressure and asthma presents with wife after being found unresponsive on the bathroom floor  EMS states that patient was found on the floor given 1 mg of Narcan and had positive response  Patient arrives denying any drug or alcohol use  States he took his methadone today but took 40 mg which is no more than his regular dose  Denies any pain  Patient is alert and oriented but appears lethargic  Patient states he does not know why 911 was called or how he got here  History provided by:  Patient   used: No        Prior to Admission Medications   Prescriptions Last Dose Informant Patient Reported? Taking? Fluticasone Furoate-Vilanterol (Breo Ellipta) 100-25 mcg/actuation inhaler   No No   Sig: Inhale 1 puff daily Rinse mouth after use     acetaminophen (TYLENOL) 500 mg tablet   No No   Sig: Take 1 tablet (500 mg total) by mouth every 6 (six) hours as needed for moderate pain   albuterol (ProAir HFA) 90 mcg/act inhaler   No No   Sig: Inhale 2 puffs every 6 (six) hours as needed for wheezing   amLODIPine (NORVASC) 5 mg tablet   Yes No   Sig: Take 5 mg by mouth daily   aspirin (ECOTRIN LOW STRENGTH) 81 mg EC tablet   Yes No   Sig: Take 81 mg by mouth daily   atorvastatin (LIPITOR) 80 mg tablet   Yes No   Sig: Take 80 mg by mouth daily   buPROPion (WELLBUTRIN SR) 150 mg 12 hr tablet   Yes No   Sig: Take 150 mg by mouth 2 (two) times a day   cloNIDine (CATAPRES-TTS-1) 0 1 mg/24 hr   No No   Sig: Place 1 patch (0 1 mg total) on the skin once a week "  fenofibrate (TRICOR) 145 mg tablet   Yes No   Sig: Take 145 mg by mouth daily   metFORMIN (GLUCOPHAGE) 500 mg tablet   Yes No   Sig: Take 500 mg by mouth 2 (two) times a day with meals   methadone (DOLOPHINE) 10 mg tablet   No No   Sig: Dosing per methadone clinic,   naloxone (NARCAN) 4 mg/0 1 mL nasal spray   No No   Sig: Administer 1 spray into a nostril  If no response after 2-3 minutes, give another dose in the other nostril using a new spray  nicotine (NICODERM CQ) 14 mg/24hr TD 24 hr patch   No No   Sig: Apply a new patch every 24 hours   Patient not taking: Reported on 9/24/2021   omeprazole (PriLOSEC) 20 mg delayed release capsule   Yes No   Sig: Take 20 mg by mouth daily   tamsulosin (FLOMAX) 0 4 mg   Yes No   Sig: Take 0 4 mg by mouth daily with dinner      Facility-Administered Medications: None       Past Medical History:   Diagnosis Date   • Asthma    • Diabetes mellitus ([facility] )    • Heroin abuse ([facility] )     \"20 years\" snorting heroin; last use 1 week ago  • Hyperlipidemia    • Hypertension        History reviewed  No pertinent surgical history  History reviewed  No pertinent family history  I have reviewed and agree with the history as documented  E-Cigarette/Vaping   • E-Cigarette Use Never User      E-Cigarette/Vaping Substances   • Nicotine No    • THC No    • CBD No    • Flavoring No    • Other No    • Unknown No      Social History     Tobacco Use   • Smoking status: Some Days     Packs/day: 0 25     Types: Cigarettes   • Smokeless tobacco: Never   • Tobacco comments:     1 pack lasts 2-3 days   Vaping Use   • Vaping Use: Never used   Substance Use Topics   • Alcohol use: No   • Drug use: Not Currently     Types: Heroin     Comment: on methadone       Review of Systems   Constitutional: Negative  Negative for chills and fatigue  HENT: Negative for ear pain and sore throat  Eyes: Negative for photophobia and redness  Respiratory: Negative for apnea, cough and shortness of breath    " Cardiovascular: Negative for chest pain  Gastrointestinal: Negative for abdominal pain, nausea and vomiting  Genitourinary: Negative for dysuria  Musculoskeletal: Negative for arthralgias, neck pain and neck stiffness  Skin: Negative for rash  Neurological: Negative for dizziness, tremors, syncope and weakness  Psychiatric/Behavioral: Negative for suicidal ideas  Physical Exam  Physical Exam  Constitutional:       General: He is not in acute distress  Appearance: He is well-developed  He is not diaphoretic  Eyes:      Extraocular Movements: Extraocular movements intact  Pupils: Pupils are equal, round, and reactive to light  Cardiovascular:      Rate and Rhythm: Normal rate and regular rhythm  Pulmonary:      Effort: Pulmonary effort is normal  No respiratory distress  Breath sounds: Normal breath sounds  Abdominal:      General: Bowel sounds are normal  There is no distension  Palpations: Abdomen is soft  Musculoskeletal:         General: Normal range of motion  Cervical back: Normal range of motion and neck supple  Skin:     General: Skin is warm and dry  Neurological:      General: No focal deficit present  Mental Status: He is alert and oriented to person, place, and time           Vital Signs  ED Triage Vitals [06/12/23 2009]   Temperature Pulse Respirations Blood Pressure SpO2   (!) 100 7 °F (38 2 °C) 80 18 (!) 201/93 100 %      Temp Source Heart Rate Source Patient Position - Orthostatic VS BP Location FiO2 (%)   Tympanic Monitor Lying Left arm --      Pain Score       --           Vitals:    06/12/23 2030 06/12/23 2100 06/12/23 2115 06/12/23 2215   BP: (!) 183/77 146/73 128/64 163/76   Pulse: 83 68 69 55   Patient Position - Orthostatic VS: Lying Lying Lying Lying         Visual Acuity      ED Medications  Medications   naloxone (FOR EMS ONLY) (NARCAN) 2 MG/2ML injection 2 mg (0 mg Does not apply Given to EMS 6/12/23 2011)       Diagnostic Studies  Results Reviewed     Procedure Component Value Units Date/Time    HS Troponin I 2hr [[id number]]  (Normal) Collected: 06/12/23 2213    Lab Status: Final result Specimen: Blood from Arm, Right Updated: 06/12/23 2248     hs TnI 2hr 7 ng/L      Delta 2hr hsTnI 1 ng/L     HS Troponin I 4hr [[id number]]     Lab Status: No result Specimen: Blood     Fingerstick Glucose (POCT) [[id number]]  (Normal) Collected: 06/12/23 2023    Lab Status: Final result Updated: 06/12/23 2118     POC Glucose 125 mg/dl     HS Troponin 0hr (reflex protocol) [[id number]]  (Normal) Collected: 06/12/23 2024    Lab Status: Final result Specimen: Blood from Arm, Right Updated: 06/12/23 2054     hs TnI 0hr 6 ng/L     Comprehensive metabolic panel [[id number]]  (Abnormal) Collected: 06/12/23 2024    Lab Status: Final result Specimen: Blood from Arm, Right Updated: 06/12/23 2049     Sodium 138 mmol/L      Potassium 3 2 mmol/L      Chloride 103 mmol/L      CO2 26 mmol/L      ANION GAP 9 mmol/L      BUN 20 mg/dL      Creatinine 1 04 mg/dL      Glucose 126 mg/dL      Calcium 9 2 mg/dL      AST 15 U/L      ALT 13 U/L      Alkaline Phosphatase 100 U/L      Total Protein 6 6 g/dL      Albumin 3 8 g/dL      Total Bilirubin 0 29 mg/dL      eGFR 68 ml/min/1 73sq m     Narrative:      [place] guidelines for Chronic Kidney Disease (CKD):   •  Stage 1 with normal or high GFR (GFR > 90 mL/min/1 73 square meters)  •  Stage 2 Mild CKD (GFR = 60-89 mL/min/1 73 square meters)  •  Stage 3A Moderate CKD (GFR = 45-59 mL/min/1 73 square meters)  •  Stage 3B Moderate CKD (GFR = 30-44 mL/min/1 73 square meters)  •  Stage 4 Severe CKD (GFR = 15-29 mL/min/1 73 square meters)  •  Stage 5 End Stage CKD (GFR <15 mL/min/1 73 square meters)  Note: GFR calculation is accurate only with a steady state creatinine    CBC and differential [[id number]] Collected: 06/12/23 2024    Lab Status: Final result Specimen: Blood from Arm, Right Updated: 06/12/23 2045     WBC 9 27 Thousand/uL      RBC 4 68 Million/uL      Hemoglobin 13 4 g/dL      Hematocrit 41 7 %      MCV 89 fL      MCH 28 6 pg      MCHC 32 1 g/dL      RDW 14 7 %      MPV 11 2 fL      Platelets 648 Thousands/uL      nRBC 0 /100 WBCs      Neutrophils Relative 58 %      Immat GRANS % 0 %      Lymphocytes Relative 33 %      Monocytes Relative 6 %      Eosinophils Relative 3 %      Basophils Relative 0 %      Neutrophils Absolute 5 37 Thousands/µL      Immature Grans Absolute 0 02 Thousand/uL      Lymphocytes Absolute 3 02 Thousands/µL      Monocytes Absolute 0 59 Thousand/µL      Eosinophils Absolute 0 23 Thousand/µL      Basophils Absolute 0 04 Thousands/µL                  CT head without contrast   Final Result by Ammon Mcgill DO (06/12 2245)      No acute intracranial abnormality  Workstation performed: [id number]         CT spine cervical without contrast   Final Result by Ammon Mcgill DO (06/12 2251)      No cervical spine fracture or traumatic malalignment                    Workstation performed: [id number]                    Procedures  ECG 12 Lead Documentation Only    Date/Time: 6/12/2023 8:35 PM    Performed by: Yang Morse PA-C  Authorized by: Yang Morse PA-C    Indications / Diagnosis:  Syncope  ECG reviewed by me, the ED Provider: yes    Patient location:  ED  Interpretation:     Interpretation: normal    Rate:     ECG rate:  76    ECG rate assessment: normal    Rhythm:     Rhythm: sinus rhythm    Ectopy:     Ectopy: none    QRS:     QRS axis:  Normal    QRS intervals:  Normal  Conduction:     Conduction: normal    ST segments:     ST segments:  Normal  T waves:     T waves: normal               ED Course                  Stroke Assessment     Row Name 06/12/23 2045             NIH Stroke Scale    Interval Baseline      Level of Consciousness (1a ) 1      LOC Questions (1b ) 0      LOC Commands (1c ) 0      Best Gaze (2 ) 0      Visual (3 ) 0      Facial Palsy (4 ) 0      Motor Arm, Left (5a ) 0      Motor Arm, Right (5b ) 0      Motor Leg, Left (6a ) 0      Motor Leg, Right (6b ) 0      Limb Ataxia (7 ) 0      Sensory (8 ) 0      Best Language (9 ) 0      Dysarthria (10 ) 0      Extinction and Inattention (11 ) (Formerly Neglect) 0      Total 1              Flowsheet Row Most Recent Value   Thrombolytic Decision Options    Thrombolytic Decision Patient not a candidate  Patient is not a candidate options Unclear time of onset outside appropriate time window , Symptoms resolved/clearly non disabling  Medical Decision Making  Patient presented with mild lethargy after being found unresponsive at home  History of drug abuse  EMS stated that patient received 2 mg of Narcan with positive response  Upon arrival patient denied any substance abuse therefore other cause was worked up  Patient had no focal findings on laboratory or CT evaluation  On reevaluation patient was alert and oriented x4 with improved mental status and denied any complaints  Patient continued denies any substance abuse however symptoms today thought to be related to possible overdose given positive response to Narcan  Patient declined detox stating he does not use anything  Primary chest 1 initially due to decreased mental status however on repeat evaluation [facility] was 0  Low concern for other pathology  Educated on supportive care  Ambulate other department  Given return precautions  Amount and/or Complexity of Data Reviewed  Labs: ordered  Radiology: ordered            Disposition  Final diagnoses:   Accidental overdose, initial encounter     Time reflects when diagnosis was documented in both MDM as applicable and the Disposition within this note     Time User Action Codes Description Comment    6/12/2023 11:06 PM Sandi Garcia [address] 67 Accidental overdose, initial encounter       ED Disposition     ED Disposition   Discharge    Condition   Stable Date/Time   Mon Jun 12, 2023 11:05 PM    Comment   Ronald Lima discharge to home/self care  Follow-up Information     Follow up With Specialties Details Why Contact Info Additional Information    J  Mayda You MD Internal Medicine Call  As needed [phone]  [address] [place] [address] Emergency Department Emergency Medicine Go to  If symptoms worsen [address] [id number]  [facility] Emergency Department          Patient's Medications   Discharge Prescriptions    No medications on file       No discharge procedures on file      PDMP Review       Value Time User    PDMP Reviewed  Yes 9/25/2021  4:11 PM James Orourke MD          ED Provider  Electronically Signed by           Geetha Theodore PA-C  06/12/23 8837
